# Patient Record
Sex: MALE | Race: OTHER | HISPANIC OR LATINO | Employment: FULL TIME | URBAN - METROPOLITAN AREA
[De-identification: names, ages, dates, MRNs, and addresses within clinical notes are randomized per-mention and may not be internally consistent; named-entity substitution may affect disease eponyms.]

---

## 2017-01-16 ENCOUNTER — PATIENT OUTREACH (OUTPATIENT)
Dept: OTHER | Facility: OTHER | Age: 57
End: 2017-01-16
Payer: COMMERCIAL

## 2017-01-16 NOTE — PROGRESS NOTES
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 Inquired about how his low sodium diet and exercise is going. Overall, his readings are looking pretty good and he continues to take readings weekly. Advised pt to call or message back if he has any questions or concerns.

## 2017-01-23 DIAGNOSIS — R97.20 ELEVATED PSA: ICD-10-CM

## 2017-01-23 DIAGNOSIS — F43.0 SITUATIONAL, DISTURBANCE, ACUTE: ICD-10-CM

## 2017-01-23 RX ORDER — FINASTERIDE 5 MG/1
5 TABLET, FILM COATED ORAL DAILY
Qty: 90 TABLET | Refills: 3 | Status: SHIPPED | OUTPATIENT
Start: 2017-01-23 | End: 2017-09-13

## 2017-01-23 RX ORDER — CITALOPRAM 10 MG/1
10 TABLET ORAL DAILY
Qty: 90 TABLET | Refills: 3 | Status: SHIPPED | OUTPATIENT
Start: 2017-01-23 | End: 2017-03-07 | Stop reason: SDUPTHER

## 2017-02-24 ENCOUNTER — PATIENT OUTREACH (OUTPATIENT)
Dept: OTHER | Facility: OTHER | Age: 57
End: 2017-02-24
Payer: COMMERCIAL

## 2017-02-24 NOTE — PROGRESS NOTES
Last 5 Patient Entered Readings                                                               Current 30 Day Average: 128/82     Recent Readings 2/15/2017 2/7/2017 2/7/2017 2/1/2017 2/1/2017    Systolic BP (mmHg) 127 134 146 127 128    Diastolic BP (mmHg) 80 79 85 90 92    Pulse 75 66 66 79 83        LVM to follow up with Dr. Parth Juarez. Inquired about how his low sodium diet and exercise is going. Overall, his readings are looking good, just reminded him to get one in today or over the weekend. Advised pt to call or message back if he has any questions or concerns.      2/28 - Pt LVM letting me that he will take some readings and he will let me know if he has any issues.

## 2017-03-06 ENCOUNTER — DOCUMENTATION ONLY (OUTPATIENT)
Dept: FAMILY MEDICINE | Facility: CLINIC | Age: 57
End: 2017-03-06

## 2017-03-06 ENCOUNTER — PATIENT OUTREACH (OUTPATIENT)
Dept: OTHER | Facility: OTHER | Age: 57
End: 2017-03-06
Payer: COMMERCIAL

## 2017-03-06 ENCOUNTER — TELEPHONE (OUTPATIENT)
Dept: FAMILY MEDICINE | Facility: CLINIC | Age: 57
End: 2017-03-06

## 2017-03-06 ENCOUNTER — LAB VISIT (OUTPATIENT)
Dept: LAB | Facility: HOSPITAL | Age: 57
End: 2017-03-06
Attending: FAMILY MEDICINE
Payer: COMMERCIAL

## 2017-03-06 DIAGNOSIS — Z23 NEED FOR SHINGLES VACCINE: Primary | ICD-10-CM

## 2017-03-06 DIAGNOSIS — Z12.5 PROSTATE CANCER SCREENING: ICD-10-CM

## 2017-03-06 DIAGNOSIS — E78.5 HYPERLIPIDEMIA, UNSPECIFIED HYPERLIPIDEMIA TYPE: Primary | ICD-10-CM

## 2017-03-06 DIAGNOSIS — R73.9 HYPERGLYCEMIA: ICD-10-CM

## 2017-03-06 DIAGNOSIS — E78.5 HYPERLIPIDEMIA, UNSPECIFIED HYPERLIPIDEMIA TYPE: ICD-10-CM

## 2017-03-06 LAB
ALBUMIN SERPL BCP-MCNC: 4.1 G/DL
ALP SERPL-CCNC: 69 U/L
ALT SERPL W/O P-5'-P-CCNC: 35 U/L
ANION GAP SERPL CALC-SCNC: 7 MMOL/L
AST SERPL-CCNC: 27 U/L
BASOPHILS # BLD AUTO: 0.01 K/UL
BASOPHILS NFR BLD: 0.2 %
BILIRUB SERPL-MCNC: 0.6 MG/DL
BUN SERPL-MCNC: 17 MG/DL
CALCIUM SERPL-MCNC: 9.8 MG/DL
CHLORIDE SERPL-SCNC: 104 MMOL/L
CHOLEST/HDLC SERPL: 3 {RATIO}
CO2 SERPL-SCNC: 29 MMOL/L
COMPLEXED PSA SERPL-MCNC: 3.4 NG/ML
CREAT SERPL-MCNC: 1.1 MG/DL
DIFFERENTIAL METHOD: ABNORMAL
EOSINOPHIL # BLD AUTO: 0.1 K/UL
EOSINOPHIL NFR BLD: 2.4 %
ERYTHROCYTE [DISTWIDTH] IN BLOOD BY AUTOMATED COUNT: 13.3 %
EST. GFR  (AFRICAN AMERICAN): >60 ML/MIN/1.73 M^2
EST. GFR  (NON AFRICAN AMERICAN): >60 ML/MIN/1.73 M^2
GLUCOSE SERPL-MCNC: 84 MG/DL
HCT VFR BLD AUTO: 41.5 %
HDL/CHOLESTEROL RATIO: 33.3 %
HDLC SERPL-MCNC: 171 MG/DL
HDLC SERPL-MCNC: 57 MG/DL
HGB BLD-MCNC: 14 G/DL
LDLC SERPL CALC-MCNC: 95.2 MG/DL
LYMPHOCYTES # BLD AUTO: 1.9 K/UL
LYMPHOCYTES NFR BLD: 35.8 %
MCH RBC QN AUTO: 30.5 PG
MCHC RBC AUTO-ENTMCNC: 33.7 %
MCV RBC AUTO: 90 FL
MONOCYTES # BLD AUTO: 0.3 K/UL
MONOCYTES NFR BLD: 6.2 %
NEUTROPHILS # BLD AUTO: 3 K/UL
NEUTROPHILS NFR BLD: 55.4 %
NONHDLC SERPL-MCNC: 114 MG/DL
PLATELET # BLD AUTO: 201 K/UL
PMV BLD AUTO: 10.5 FL
POTASSIUM SERPL-SCNC: 4.7 MMOL/L
PROT SERPL-MCNC: 7.5 G/DL
RBC # BLD AUTO: 4.59 M/UL
SODIUM SERPL-SCNC: 140 MMOL/L
TRIGL SERPL-MCNC: 94 MG/DL
WBC # BLD AUTO: 5.36 K/UL

## 2017-03-06 PROCEDURE — 80061 LIPID PANEL: CPT

## 2017-03-06 PROCEDURE — 84153 ASSAY OF PSA TOTAL: CPT

## 2017-03-06 PROCEDURE — 36415 COLL VENOUS BLD VENIPUNCTURE: CPT | Mod: PO

## 2017-03-06 PROCEDURE — 80053 COMPREHEN METABOLIC PANEL: CPT

## 2017-03-06 PROCEDURE — 83036 HEMOGLOBIN GLYCOSYLATED A1C: CPT

## 2017-03-06 PROCEDURE — 85025 COMPLETE CBC W/AUTO DIFF WBC: CPT

## 2017-03-06 NOTE — PROGRESS NOTES
Pre-Visit Chart Review  For Appointment Scheduled on 3-7-17    Health Maintenance Due   Topic Date Due    Lipid Panel  01/18/2017

## 2017-03-06 NOTE — PROGRESS NOTES
Last 5 Patient Entered Readings                                                               Current 30 Day Average: 134/84     Recent Readings 3/6/2017 3/6/2017 3/6/2017 3/6/2017 3/6/2017    Systolic BP (mmHg) 143 149 153 166 156    Diastolic BP (mmHg) 87 99 102 138 98    Pulse 65 71 70 75 72        Hypertension Medications             losartan-hydrochlorothiazide 100-25 mg (HYZAAR) 100-25 mg per tablet Take 1 tablet by mouth every morning.        Plan:   Called patient to follow up. Per current 30 day average, BP is well controlled. Readings taken this am were elevated. LVM.  Will continue to monitor. WCB in 3 months, sooner if BP begins to trend up or down.

## 2017-03-07 ENCOUNTER — OFFICE VISIT (OUTPATIENT)
Dept: FAMILY MEDICINE | Facility: CLINIC | Age: 57
End: 2017-03-07
Payer: COMMERCIAL

## 2017-03-07 VITALS
HEART RATE: 59 BPM | TEMPERATURE: 98 F | BODY MASS INDEX: 28.07 KG/M2 | DIASTOLIC BLOOD PRESSURE: 85 MMHG | HEIGHT: 68 IN | SYSTOLIC BLOOD PRESSURE: 134 MMHG | WEIGHT: 185.19 LBS

## 2017-03-07 DIAGNOSIS — R73.9 HYPERGLYCEMIA: ICD-10-CM

## 2017-03-07 DIAGNOSIS — J45.990 EXERCISE-INDUCED ASTHMA: ICD-10-CM

## 2017-03-07 DIAGNOSIS — I10 GOOD HYPERTENSION CONTROL: ICD-10-CM

## 2017-03-07 DIAGNOSIS — E78.5 HYPERLIPIDEMIA, UNSPECIFIED HYPERLIPIDEMIA TYPE: ICD-10-CM

## 2017-03-07 DIAGNOSIS — Z01.00 EYE EXAM, ROUTINE: ICD-10-CM

## 2017-03-07 DIAGNOSIS — R80.9 POSITIVE FOR MICROALBUMINURIA: ICD-10-CM

## 2017-03-07 DIAGNOSIS — R97.20 ELEVATED PSA: ICD-10-CM

## 2017-03-07 DIAGNOSIS — Z12.5 SCREENING PSA (PROSTATE SPECIFIC ANTIGEN): ICD-10-CM

## 2017-03-07 DIAGNOSIS — F41.9 CHRONIC ANXIETY: ICD-10-CM

## 2017-03-07 LAB
ESTIMATED AVG GLUCOSE: 114 MG/DL
HBA1C MFR BLD HPLC: 5.6 %

## 2017-03-07 PROCEDURE — 99999 PR PBB SHADOW E&M-EST. PATIENT-LVL III: CPT | Mod: PBBFAC,,, | Performed by: FAMILY MEDICINE

## 2017-03-07 PROCEDURE — 99214 OFFICE O/P EST MOD 30 MIN: CPT | Mod: S$GLB,,, | Performed by: FAMILY MEDICINE

## 2017-03-07 RX ORDER — ATORVASTATIN CALCIUM 40 MG/1
40 TABLET, FILM COATED ORAL DAILY
Qty: 90 TABLET | Refills: 1 | Status: SHIPPED | OUTPATIENT
Start: 2017-03-07 | End: 2017-09-13 | Stop reason: SDUPTHER

## 2017-03-07 RX ORDER — CITALOPRAM 20 MG/1
20 TABLET, FILM COATED ORAL DAILY
Qty: 90 TABLET | Refills: 3 | Status: SHIPPED | OUTPATIENT
Start: 2017-03-07 | End: 2017-04-28 | Stop reason: SDUPTHER

## 2017-03-07 NOTE — MR AVS SNAPSHOT
[facility]  [address]  [place] [address]  Phone: [phone]  Fax: [phone]                  Parth Juarez   3/7/2017 2:00 PM   Office Visit    Description:  Male : 1960   Provider:  Betty Styles MD   Department:  [facility] Medicine           Reason for Visit     Annual Exam     Establish Care           Diagnoses this Visit        Comments    Eye exam, routine    -  Primary     Situational, disturbance, acute                To Do List           Future Appointments        Provider Department Dept Phone    3/14/2017 2:30 PM Agusto Dawn, [facility] [phone]    2017 2:40 PM Betty Styles MD [facility] [phone]      Goals (5 Years of Data)              Today    Today    Today    Blood Pressure <= 140/90   134/85  134/85  134/85    Notes - Note created  2016  2:46 PM by Anay Arnold, Leta    It is important to consistently maintain a controlled blood pressure.      Exercise at least 150 minutes per week.           Notes - Note created  2016  2:46 PM by Anay Arnold, PharmD    Continue walking every morning for 30 minutes!      Take at least one BP reading per week at various times of the day             Follow-Up and Disposition     Return in about 6 months (around 2017).       These Medications        Disp Refills Start End    atorvastatin (LIPITOR) 40 MG tablet 90 tablet 1 3/7/2017     Take 1 tablet (40 mg total) by mouth once daily. - Oral    [facility] [address] [place] [id number] [address] [employer] Ph #: [phone]       citalopram (CELEXA) 20 MG tablet 90 tablet 3 3/7/2017 3/7/2018    Take 1 tablet (20 mg total) by mouth once daily. - Oral    Pharmacy: [facility] [address] [place] [id number] Ph #: [phone]         [facility]     [facility] Nurse Care [facility] -  Assistance  Registered nurses in the [facility] Call Center provide clinical  advisement, health education, appointment booking, and other advisory services.  Call for this free service at [phone].             Medications           Message regarding Medications     Verify the changes and/or additions to your medication regime listed below are the same as discussed with your clinician today.  If any of these changes or additions are incorrect, please notify your healthcare provider.        CHANGE how you are taking these medications     Start Taking Instead of    atorvastatin (LIPITOR) 40 MG tablet atorvastatin (LIPITOR) 40 MG tablet    Dosage:  Take 1 tablet (40 mg total) by mouth once daily. Dosage:  Take 40 mg by mouth once daily.    Reason for Change:  Reorder     citalopram (CELEXA) 20 MG tablet citalopram (CELEXA) 10 MG tablet    Dosage:  Take 1 tablet (20 mg total) by mouth once daily. Dosage:  Take 1 tablet (10 mg total) by mouth once daily.    Reason for Change:  Reorder            Verify that the below list of medications is an accurate representation of the medications you are currently taking.  If none reported, the list may be blank. If incorrect, please contact your healthcare provider. Carry this list with you in case of emergency.           Current Medications     albuterol 90 mcg/actuation inhaler Inhale 2 puffs into the lungs every 6 (six) hours as needed for Wheezing.    ascorbic acid, vitamin C, (VITAMIN C) 500 MG tablet Take 500 mg by mouth once daily.    atorvastatin (LIPITOR) 40 MG tablet Take 1 tablet (40 mg total) by mouth once daily.    citalopram (CELEXA) 20 MG tablet Take 1 tablet (20 mg total) by mouth once daily.    fluticasone (FLONASE) 50 mcg/actuation nasal spray 1 spray by Each Nare route daily as needed for Rhinitis.    FOLIC ACID/MULTIVIT-MIN/LUTEIN (CENTRUM SILVER ORAL) Take 1 tablet by mouth once daily.    inhalation device (AEROCHAMBER PLUS FLOW-VU) Use as directed for inhalation.    losartan-hydrochlorothiazide 100-25 mg (HYZAAR) 100-25 mg per  "tablet Take 1 tablet by mouth every morning.    OMEGA-3S/DHA/EPA/FISH OIL (OMEGA 3 ORAL) Take 4 capsules by mouth once daily.     psyllium (METAMUCIL) 0.52 gram capsule Take 4 capsules by mouth once daily.     finasteride (PROSCAR) 5 mg tablet Take 1 tablet (5 mg total) by mouth once daily.           Clinical Reference Information           Your Vitals Were     BP Pulse Temp Height Weight BMI    134/85 (BP Location: Right arm, Patient Position: Sitting, BP Method: Automatic) 59 97.8 °F (36.6 °C) (Oral) 5' 8" (1.727 m) 84 kg (185 lb 3 oz) 28.16 kg/m2      Blood Pressure          Most Recent Value    BP  134/85      Allergies as of 3/7/2017     No Known Drug Allergies      Immunizations Administered on Date of Encounter - 3/7/2017     None      Orders Placed During Today's Visit      Normal Orders This Visit    Ambulatory Referral to Optometry       Language Assistance Services     ATTENTION: Language assistance services are available, free of charge. Please call [phone].      ATENCIÓN: Si marshall bianca, tiene a de jesus disposición servicios gratuitos de asistencia lingüística. Llame al [phone].     [facility] Ý: N?u b?n nói Ti?ng Vi?t, có các d?ch v? h? tr? ngôn ng? mi?n phí dành cho b?n. G?i s? [phone].         [facility] complies with applicable Federal civil rights laws and does not discriminate on the basis of race, color, national origin, age, disability, or sex.        "

## 2017-03-07 NOTE — PROGRESS NOTES
Subjective:       Patient ID: Parth Juarez is a 56 y.o. male.    Chief Complaint: Annual Exam and Establish Care    Patient Active Problem List   Diagnosis    Hyperlipidemia    Good hypertension control    Exercise-induced asthma    Positive for microalbuminuria    Chronic anxiety    Hyperglycemia   Patient is here to establish care and get routine check up    HTN- participating in the digital HTN program.  Most numbers are at goal < 140/90 consistently.  He has gained 7 lbs recently due to a vacation to [place].  He tries to exercise and walk regularly.  Non smoker.      Started celexa 2013 during a period of increased anxiety and sad mood.  Finds it is helping but anxiety is not as well controlled on 10 as opposed to 20mg.  20mg makes him feel less creative. But he finds his performance is better on 20mg     Has h/o borderline elevated non-fasting BS.  Reviewed recent labs  Lab Visit on 03/06/2017   Component Date Value Ref Range Status    Microalbum.,U,Random 03/06/2017 117.0  ug/mL Final    Creatinine, Random Ur 03/06/2017 205.0  23.0 - 375.0 mg/dL Final    Microalb Creat Ratio 03/06/2017 57.1* 0.0 - 30.0 ug/mg Final   Lab Visit on 03/06/2017   Component Date Value Ref Range Status    WBC 03/06/2017 5.36  3.90 - 12.70 K/uL Final    RBC 03/06/2017 4.59* 4.60 - 6.20 M/uL Final    Hemoglobin 03/06/2017 14.0  14.0 - 18.0 g/dL Final    Hematocrit 03/06/2017 41.5  40.0 - 54.0 % Final    MCV 03/06/2017 90  82 - 98 fL Final    MCH 03/06/2017 30.5  27.0 - 31.0 pg Final    MCHC 03/06/2017 33.7  32.0 - 36.0 % Final    RDW 03/06/2017 13.3  11.5 - 14.5 % Final    Platelets 03/06/2017 201  150 - 350 K/uL Final    MPV 03/06/2017 10.5  9.2 - 12.9 fL Final    Gran # 03/06/2017 3.0  1.8 - 7.7 K/uL Final    Lymph # 03/06/2017 1.9  1.0 - 4.8 K/uL Final    Mono # 03/06/2017 0.3  0.3 - 1.0 K/uL Final    Eos # 03/06/2017 0.1  0.0 - 0.5 K/uL Final    Baso # 03/06/2017 0.01  0.00 - 0.20 K/uL Final    Gran%  03/06/2017 55.4  38.0 - 73.0 % Final    Lymph% 03/06/2017 35.8  18.0 - 48.0 % Final    Mono% 03/06/2017 6.2  4.0 - 15.0 % Final    Eosinophil% 03/06/2017 2.4  0.0 - 8.0 % Final    Basophil% 03/06/2017 0.2  0.0 - 1.9 % Final    Differential Method 03/06/2017 Automated   Final    Sodium 03/06/2017 140  136 - 145 mmol/L Final    Potassium 03/06/2017 4.7  3.5 - 5.1 mmol/L Final    Chloride 03/06/2017 104  95 - 110 mmol/L Final    CO2 03/06/2017 29  23 - 29 mmol/L Final    Glucose 03/06/2017 84  70 - 110 mg/dL Final    BUN, Bld 03/06/2017 17  6 - 20 mg/dL Final    Creatinine 03/06/2017 1.1  0.5 - 1.4 mg/dL Final    Calcium 03/06/2017 9.8  8.7 - 10.5 mg/dL Final    Total Protein 03/06/2017 7.5  6.0 - 8.4 g/dL Final    Albumin 03/06/2017 4.1  3.5 - 5.2 g/dL Final    Total Bilirubin 03/06/2017 0.6  0.1 - 1.0 mg/dL Final    Alkaline Phosphatase 03/06/2017 69  55 - 135 U/L Final    AST 03/06/2017 27  10 - 40 U/L Final    ALT 03/06/2017 35  10 - 44 U/L Final    Anion Gap 03/06/2017 7* 8 - 16 mmol/L Final    eGFR if African American 03/06/2017 >60.0  >60 mL/min/1.73 m^2 Final    eGFR if non African American 03/06/2017 >60.0  >60 mL/min/1.73 m^2 Final    Hemoglobin A1C 03/06/2017 5.6  4.5 - 6.2 % Final    Estimated Avg Glucose 03/06/2017 114  68 - 131 mg/dL Final    Cholesterol 03/06/2017 171  120 - 199 mg/dL Final    Triglycerides 03/06/2017 94  30 - 150 mg/dL Final    HDL 03/06/2017 57  40 - 75 mg/dL Final    LDL Cholesterol 03/06/2017 95.2  63.0 - 159.0 mg/dL Final    HDL/Chol Ratio 03/06/2017 33.3  20.0 - 50.0 % Final    Total Cholesterol/HDL Ratio 03/06/2017 3.0  2.0 - 5.0 Final    Non-HDL Cholesterol 03/06/2017 114  mg/dL Final    PSA, SCREEN 03/06/2017 3.4  0.00 - 4.00 ng/mL Final             HPI  Review of Systems   Constitutional: Positive for unexpected weight change.   Respiratory: Negative for shortness of breath.    Cardiovascular: Negative for chest pain, palpitations and leg  swelling.   Genitourinary: Negative for difficulty urinating, frequency and hematuria.   Musculoskeletal: Negative for neck pain.   Neurological: Negative for headaches.       Objective:      Physical Exam   Constitutional: He is oriented to person, place, and time. He appears well-developed and well-nourished.   HENT:   Right Ear: Tympanic membrane and ear canal normal.   Left Ear: Tympanic membrane and ear canal normal.   Nose: Nose normal.   Mouth/Throat: Oropharynx is clear and moist.   Neck: No thyromegaly present.   Cardiovascular: Normal rate, regular rhythm and normal heart sounds.    Pulmonary/Chest: Effort normal and breath sounds normal.   Abdominal: Soft. Bowel sounds are normal. He exhibits no distension. There is no tenderness. There is no rebound and no guarding.   Musculoskeletal: He exhibits no edema.   Lymphadenopathy:     He has no cervical adenopathy.   Neurological: He is alert and oriented to person, place, and time.   Skin: Skin is warm and dry.   Psychiatric: He has a normal mood and affect.   Nursing note and vitals reviewed.      Assessment:       1. Good hypertension control    2. Chronic anxiety    3. Eye exam, routine    4. Hyperlipidemia, unspecified hyperlipidemia type    5. Hyperglycemia    6. Screening PSA (prostate specific antigen)    7. Positive for microalbuminuria    8. Elevated PSA    9. Exercise-induced asthma        Plan:       1. Good hypertension control  Controlled on current medications.  Continue current medications.      2. Chronic anxiety  Uncontrolled.  Increase to:  - citalopram (CELEXA) 20 MG tablet; Take 1 tablet (20 mg total) by mouth once daily.  Dispense: 90 tablet; Refill: 3  I discussed with the patient the risks, side effects and the benefits of the medication including the black box warning regarding suicidal ideation/risk if applicable.  I counseled the patient on medication titration, length of time before maximum benefits are reached, and duration of  treatment expected.  I advised the patient to return to clinic or go to the emergency department if suicidal thoughts occur, thought of hurting others, hallucinations, or other serious symptoms.  Patient voiced no intention of self-harm.  The patient expressed verbal understanding and elected to proceed with treatment.  All questions were answered.      3. Eye exam, routine  Refer for eval and treatment  - Ambulatory Referral to Optometry    4. Hyperlipidemia, unspecified hyperlipidemia type  Controlled on current meds  - Comprehensive metabolic panel; Future  - Lipid panel; Future  - CBC auto differential; Future    5. Hyperglycemia  Controlled on diet  - Hemoglobin A1c; Future  - Microalbumin/creatinine urine ratio; Future    6. Screening PSA (prostate specific antigen)  Screen and treat as indicated:    - PSA, Screening; Future    7. Positive for microalbuminuria  ON ARB.  Control HTN and monitor    8. Elevated PSA  Decreased since last exam.  Will defer to urology for consult    [facility] goal documentation:  Patient readiness: acceptance and barriers:readiness  During the course of the visit the patient was educated and counseled about the following: Hypertension:   Dietary sodium restriction.  Regular aerobic exercise.  Goals: Hypertension: Reduce Blood Pressure  Goal/Outcomes met:Hypertension  The following self management tools provided:none  Patient Instructions (the written plan) was given to the patient/family: Yes  Time spent with patient: 40 minutes    Patient with be reevaluated in 6 months or sooner prn    Greater than 50% of this visit was spent counseling as described in above documentation:Yes        9. Exercise-induced asthma  Rare use of albuterol

## 2017-03-07 NOTE — Clinical Note
Herberth Gibbs.  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  Though this is much improved since his last PSA he is taking finasteride which can falsely lower it.  Is there any reason to consider further evaluation or could I just add a free PSA in 6 months?    Thanks, Dr. Styles

## 2017-03-07 NOTE — PROGRESS NOTES
Answers for HPI/ROS submitted by the patient on 3/4/2017   Hypertension  Chronicity: recurrent  Onset: more than 1 year ago  Progression since onset: rapidly improving  Condition status: controlled  anxiety: Yes  blurred vision: No  chest pain: No  headaches: No  malaise/fatigue: No  neck pain: No  orthopnea: No  palpitations: No  peripheral edema: No  PND: No  shortness of breath: No  sweats: No  Agents associated with hypertension: no associated agents  CAD risks: dyslipidemia, obesity, sedentary lifestyle, stress  Compliance problems: diet, exercise  Past treatments: angiotensin blockers, diuretics, lifestyle changes  Improvement on treatment: significant

## 2017-03-14 ENCOUNTER — OFFICE VISIT (OUTPATIENT)
Dept: OPTOMETRY | Facility: CLINIC | Age: 57
End: 2017-03-14
Payer: COMMERCIAL

## 2017-03-14 DIAGNOSIS — H25.13 NUCLEAR SCLEROSIS, BILATERAL: Primary | ICD-10-CM

## 2017-03-14 DIAGNOSIS — H04.123 DRY EYE SYNDROME, BILATERAL: ICD-10-CM

## 2017-03-14 DIAGNOSIS — H11.001 PTERYGIUM, RIGHT: ICD-10-CM

## 2017-03-14 DIAGNOSIS — H26.9 CORTICAL CATARACT: ICD-10-CM

## 2017-03-14 DIAGNOSIS — H52.7 REFRACTIVE ERROR: ICD-10-CM

## 2017-03-14 PROCEDURE — 92014 COMPRE OPH EXAM EST PT 1/>: CPT | Mod: S$GLB,,, | Performed by: OPTOMETRIST

## 2017-03-14 PROCEDURE — 99999 PR PBB SHADOW E&M-EST. PATIENT-LVL II: CPT | Mod: PBBFAC,,, | Performed by: OPTOMETRIST

## 2017-03-14 PROCEDURE — 92015 DETERMINE REFRACTIVE STATE: CPT | Mod: S$GLB,,, | Performed by: OPTOMETRIST

## 2017-03-14 NOTE — PROGRESS NOTES
HPI     CC: Pt here today for yearly eye exam. Pt states vision has been stable   with current glasses. Reading has been stable with current glasses.     (-) pain / (-) discomfort  (-) headaches  (-) diplopia   (-) flashes / (-) floaters         Last edited by Agusto Dawn, OD on 3/14/2017  3:00 PM.     ROS     Positive for: Cardiovascular (HTN), Eyes    Negative for: Constitutional, Gastrointestinal, Neurological, Skin,   Genitourinary, Musculoskeletal, HENT, Endocrine, Respiratory, Psychiatric,   Allergic/Imm, Heme/Lymph    Last edited by Agusto Dawn, OD on 3/14/2017  3:01 PM. (History)        Assessment /Plan     For exam results, see Encounter Report.    Nuclear sclerosis, bilateral    Cortical cataract    Pterygium, right    Dry eye syndrome, bilateral    Refractive error      Mild cataracts ou. Discussed possible ocular affects of cataracts. Acceptable BCVA OU. Discussed treatment options. Surgery not recommended at this time. Monitor yearly.     Pterygium, mild, inferior nasal, flat. Non-visually significant at this time. Recommend otc Systane drops twice daily ou. Return if symptomatic.     Dispensed updated spectacle Rx. Discussed various spectacle lens options. OD stable, moderate change OS cyl. Discussed findings and adaptation period. Return if any problems.       RTC in 1 year for comprehensive eye exam, or sooner prn.

## 2017-03-14 NOTE — LETTER
March 14, 2017      Betty Styles MD  [address]  [place] [address]           [place] [facility]  [address] [place] [address] [address]  [place] [address]  Phone: [phone]          Patient: Parth Juarez   MR Number: [id number]   YOB: 1960   Date of Visit: 3/14/2017       Dear Dr. Betty Styles:    Thank you for referring Parth Juarez to me for evaluation. Attached you will find relevant portions of my assessment and plan of care.    If you have questions, please do not hesitate to call me. I look forward to following Parth Juarez along with you.    Sincerely,    Agusto Dawn, OD    Enclosure  CC:  No Recipients    If you would like to receive this communication electronically, please contact [email or web address] or [phone] to request more information on [employer] Link access.    For providers and/or their staff who would like to refer a patient to [facility], please contact us through our one-stop-shop provider referral line, [facility], at [phone].    If you feel you have received this communication in error or would no longer like to receive these types of communications, please e-mail [email or web address]

## 2017-03-24 ENCOUNTER — PATIENT OUTREACH (OUTPATIENT)
Dept: OTHER | Facility: OTHER | Age: 57
End: 2017-03-24
Payer: COMMERCIAL

## 2017-03-24 NOTE — PROGRESS NOTES
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 Patient is maintaining a low sodium diet and continuing his exercise regime. He reports that he is doing well and feeling good. Patient did not have any further questions or concerns. I will follow up in a few weeks to see how he is doing and progressing.

## 2017-04-28 ENCOUNTER — OFFICE VISIT (OUTPATIENT)
Dept: PSYCHIATRY | Facility: CLINIC | Age: 57
End: 2017-04-28
Payer: COMMERCIAL

## 2017-04-28 VITALS
TEMPERATURE: 98 F | HEIGHT: 68 IN | SYSTOLIC BLOOD PRESSURE: 131 MMHG | HEART RATE: 64 BPM | DIASTOLIC BLOOD PRESSURE: 84 MMHG

## 2017-04-28 DIAGNOSIS — Z79.899 HIGH RISK MEDICATION USE: ICD-10-CM

## 2017-04-28 DIAGNOSIS — F41.9 ANXIETY: ICD-10-CM

## 2017-04-28 DIAGNOSIS — F90.0 ATTENTION DEFICIT HYPERACTIVITY DISORDER (ADHD), PREDOMINANTLY INATTENTIVE TYPE: ICD-10-CM

## 2017-04-28 PROCEDURE — 90792 PSYCH DIAG EVAL W/MED SRVCS: CPT | Mod: S$GLB,,, | Performed by: PSYCHIATRY & NEUROLOGY

## 2017-04-28 PROCEDURE — 99999 PR PBB SHADOW E&M-EST. PATIENT-LVL II: CPT | Mod: PBBFAC,,, | Performed by: PSYCHIATRY & NEUROLOGY

## 2017-04-28 RX ORDER — CITALOPRAM 20 MG/1
TABLET, FILM COATED ORAL
Qty: 135 TABLET | Refills: 0 | Status: SHIPPED | OUTPATIENT
Start: 2017-04-28 | End: 2017-07-14 | Stop reason: DRUGHIGH

## 2017-04-28 RX ORDER — LISDEXAMFETAMINE DIMESYLATE CAPSULES 20 MG/1
20 CAPSULE ORAL EVERY MORNING
Qty: 30 CAPSULE | Refills: 0 | Status: SHIPPED | OUTPATIENT
Start: 2017-04-28 | End: 2017-05-09 | Stop reason: SINTOL

## 2017-04-28 RX ORDER — CITALOPRAM 20 MG/1
TABLET, FILM COATED ORAL
Qty: 45 TABLET | Refills: 1 | Status: SHIPPED | OUTPATIENT
Start: 2017-04-28 | End: 2017-04-28 | Stop reason: SDUPTHER
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
## 2017-04-28 NOTE — MR AVS SNAPSHOT
[facility]  [address]  [place] [address]  Phone: [phone]                  Parth Juarez   2017 11:00 AM   Office Visit    Description:  Male : 1960   Provider:  Casie Garza MD   Department:  [facility]           Diagnoses this Visit        Comments    Anxiety         Attention deficit hyperactivity disorder (ADHD), predominantly inattentive type         High risk medication use                To Do List           Future Appointments        Provider Department Dept Phone    2017 2:40 PM Betty Styles MD [facility] [phone]      Goals (5 Years of Data)              Today    17    Blood Pressure <= 140/90   131/84  131/84  131/84    Notes - Note created  2016  2:46 PM by Anay Arnold, PharmD    It is important to consistently maintain a controlled blood pressure.      Exercise at least 150 minutes per week.           Notes - Note created  2016  2:46 PM by Anay Arnold, PharmD    Continue walking every morning for 30 minutes!      Take at least one BP reading per week at various times of the day             Follow-Up and Disposition     Return in about 4 weeks (around 2017).       These Medications        Disp Refills Start End    lisdexamfetamine (VYVANSE) 20 MG capsule 30 capsule 0 2017     Take 1 capsule (20 mg total) by mouth every morning. - Oral    Pharmacy: [facility] [address] [place] [id number] [employer] Ph #: [phone]       citalopram (CELEXA) 20 MG tablet 135 tablet 0 2017     Take one and one-half tablets PO daily    Pharmacy: [facility] [address] [place] [id number] [employer] Ph #: [phone]         [facility]     [facility] Nurse Care Line -  Assistance  Unless otherwise directed by your provider, please contact [facility], our nurse care line that is available for 24/ assistance.     Registered nurses in the [facility] Center provide: appointment scheduling, clinical advisement, health education, and other advisory services.  Call: [phone] (toll free)               Medications           Message regarding Medications     Verify the changes and/or additions to your medication regime listed below are the same as discussed with your clinician today.  If any of these changes or additions are incorrect, please notify your healthcare provider.        START taking these NEW medications        Refills    lisdexamfetamine (VYVANSE) 20 MG capsule 0    Sig: Take 1 capsule (20 mg total) by mouth every morning.    Class: Normal    Route: Oral      CHANGE how you are taking these medications     Start Taking Instead of    citalopram (CELEXA) 20 MG tablet citalopram (CELEXA) 20 MG tablet    Dosage:  Take one and one-half tablets PO daily Dosage:  Take 1 tablet (20 mg total) by mouth once daily.    Reason for Change:  Reorder            Verify that the below list of medications is an accurate representation of the medications you are currently taking.  If none reported, the list may be blank. If incorrect, please contact your healthcare provider. Carry this list with you in case of emergency.           Current Medications     albuterol 90 mcg/actuation inhaler Inhale 2 puffs into the lungs every 6 (six) hours as needed for Wheezing.    ascorbic acid, vitamin C, (VITAMIN C) 500 MG tablet Take 500 mg by mouth once daily.    atorvastatin (LIPITOR) 40 MG tablet Take 1 tablet (40 mg total) by mouth once daily.    citalopram (CELEXA) 20 MG tablet Take one and one-half tablets PO daily    finasteride (PROSCAR) 5 mg tablet Take 1 tablet (5 mg total) by mouth once daily.    fluticasone (FLONASE) 50 mcg/actuation nasal spray 1 spray by Each Nare route daily as needed for Rhinitis.    FOLIC ACID/MULTIVIT-MIN/LUTEIN (CENTRUM SILVER ORAL) Take 1 tablet by mouth once daily.    inhalation device (AEROCHAMBER PLUS FLOW-VU) Use as directed for  "inhalation.    losartan-hydrochlorothiazide 100-25 mg (HYZAAR) 100-25 mg per tablet Take 1 tablet by mouth every morning.    OMEGA-3S/DHA/EPA/FISH OIL (OMEGA 3 ORAL) Take 4 capsules by mouth once daily.     psyllium (METAMUCIL) 0.52 gram capsule Take 4 capsules by mouth once daily.     lisdexamfetamine (VYVANSE) 20 MG capsule Take 1 capsule (20 mg total) by mouth every morning.           Clinical Reference Information           Your Vitals Were     BP Pulse Temp Height          131/84 64 97.6 °F (36.4 °C) 5' 8" (1.727 m)        Blood Pressure          Most Recent Value    BP  131/84      Allergies as of 4/28/2017     No Known Drug Allergies      Immunizations Administered on Date of Encounter - 4/28/2017     None      Orders Placed During Today's Visit      Normal Orders This Visit    Toxicology screen, urine     Future Labs/Procedures Expected by Expires    TSH  4/28/2017 6/27/2018    SCHEDULED EKG 12-LEAD (to Muse)  As directed 4/28/2018      Language Assistance Services     ATTENTION: Language assistance services are available, free of charge. Please call [phone].      ATENCIÓN: Si habla bianca, tiene a de jesus disposición servicios gratuitos de asistencia lingüística. Llame al [phone].     SARAH Ý: N?u b?n nói Ti?ng Vi?t, có các d?ch v? h? tr? ngôn ng? mi?n phí dành cho b?n. G?i s? [phone].         [facility] complies with applicable Federal civil rights laws and does not discriminate on the basis of race, color, national origin, age, disability, or sex.        "

## 2017-05-09 ENCOUNTER — PATIENT MESSAGE (OUTPATIENT)
Dept: PSYCHIATRY | Facility: CLINIC | Age: 57
End: 2017-05-09

## 2017-05-23 ENCOUNTER — PATIENT MESSAGE (OUTPATIENT)
Dept: ADMINISTRATIVE | Facility: OTHER | Age: 57
End: 2017-05-23

## 2017-05-25 ENCOUNTER — PATIENT OUTREACH (OUTPATIENT)
Dept: OTHER | Facility: OTHER | Age: 57
End: 2017-05-25
Payer: COMMERCIAL

## 2017-05-25 ENCOUNTER — PATIENT MESSAGE (OUTPATIENT)
Dept: OTHER | Facility: OTHER | Age: 57
End: 2017-05-25

## 2017-05-25 NOTE — PROGRESS NOTES
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 Patient is maintaining a low sodium diet and continuing his exercise regime. He was busy at work but informed me that he is doing well and feeling good. He ate some cheese last night and believes that is why his BP is elevated today but he denies symptoms. Patient did not have any further questions or concerns. I will follow up in a few weeks to see how he is doing and progressing.

## 2017-06-06 ENCOUNTER — PATIENT OUTREACH (OUTPATIENT)
Dept: OTHER | Facility: OTHER | Age: 57
End: 2017-06-06
Payer: COMMERCIAL

## 2017-06-06 NOTE — PROGRESS NOTES
Last 5 Patient Entered Readings                                                               Current 30 Day Average: 134/87     Recent Readings 6/5/2017 6/5/2017 6/5/2017 6/5/2017 6/2/2017    Systolic BP (mmHg) 126 134 136 140 135    Diastolic BP (mmHg) 80 114 83 75 87    Pulse 63 65 66 67 71        Hypertension Medications             losartan-hydrochlorothiazide 100-25 mg (HYZAAR) 100-25 mg per tablet Take 1 tablet by mouth every morning.        Plan:   Messaging patient to follow up. Per current 30 day average, BP is well controlled. Will continue to monitor. WCB in 4 months, sooner if BP begins to trend up or down.

## 2017-07-14 ENCOUNTER — PATIENT MESSAGE (OUTPATIENT)
Dept: PSYCHIATRY | Facility: CLINIC | Age: 57
End: 2017-07-14

## 2017-07-14 DIAGNOSIS — F41.9 ANXIETY: ICD-10-CM

## 2017-07-14 RX ORDER — CITALOPRAM 40 MG/1
40 TABLET, FILM COATED ORAL DAILY
Qty: 30 TABLET | Refills: 2 | Status: SHIPPED | OUTPATIENT
Start: 2017-07-14 | End: 2017-10-21 | Stop reason: SDUPTHER

## 2017-07-14 RX ORDER — CITALOPRAM 20 MG/1
TABLET, FILM COATED ORAL
Qty: 135 TABLET | Refills: 0 | OUTPATIENT
Start: 2017-07-14

## 2017-08-01 ENCOUNTER — PATIENT OUTREACH (OUTPATIENT)
Dept: OTHER | Facility: OTHER | Age: 57
End: 2017-08-01

## 2017-08-01 NOTE — PROGRESS NOTES
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 Inquired about how his low sodium diet and exercise is going. Overall, his readings appear to be controlled and he continues taking his readings weekly. Advised pt to call or message back if he has any questions or concerns.    Patient called back and informed me that he is doing great. He is also happy with how his diuretic is working and not causing him and side effects or more of an urge to use the restroom. He continues taking his readings weekly.

## 2017-09-05 DIAGNOSIS — Z12.5 PROSTATE CANCER SCREENING: ICD-10-CM

## 2017-09-05 DIAGNOSIS — E78.5 HYPERLIPIDEMIA, UNSPECIFIED HYPERLIPIDEMIA TYPE: ICD-10-CM

## 2017-09-05 DIAGNOSIS — R73.9 HYPERGLYCEMIA: ICD-10-CM

## 2017-09-05 DIAGNOSIS — R80.9 POSITIVE FOR MICROALBUMINURIA: Primary | ICD-10-CM

## 2017-09-12 ENCOUNTER — LAB VISIT (OUTPATIENT)
Dept: LAB | Facility: HOSPITAL | Age: 57
End: 2017-09-12
Attending: FAMILY MEDICINE
Payer: COMMERCIAL

## 2017-09-12 ENCOUNTER — DOCUMENTATION ONLY (OUTPATIENT)
Dept: FAMILY MEDICINE | Facility: CLINIC | Age: 57
End: 2017-09-12

## 2017-09-12 DIAGNOSIS — Z12.5 PROSTATE CANCER SCREENING: ICD-10-CM

## 2017-09-12 DIAGNOSIS — E78.5 HYPERLIPIDEMIA, UNSPECIFIED HYPERLIPIDEMIA TYPE: ICD-10-CM

## 2017-09-12 DIAGNOSIS — R73.9 HYPERGLYCEMIA: ICD-10-CM

## 2017-09-12 LAB
ALBUMIN SERPL BCP-MCNC: 3.9 G/DL
ALP SERPL-CCNC: 69 U/L
ALT SERPL W/O P-5'-P-CCNC: 37 U/L
ANION GAP SERPL CALC-SCNC: 12 MMOL/L
AST SERPL-CCNC: 27 U/L
BASOPHILS # BLD AUTO: 0.03 K/UL
BASOPHILS NFR BLD: 0.7 %
BILIRUB SERPL-MCNC: 0.3 MG/DL
BUN SERPL-MCNC: 21 MG/DL
CALCIUM SERPL-MCNC: 9.5 MG/DL
CHLORIDE SERPL-SCNC: 101 MMOL/L
CHOLEST SERPL-MCNC: 194 MG/DL
CHOLEST/HDLC SERPL: 3 {RATIO}
CO2 SERPL-SCNC: 27 MMOL/L
COMPLEXED PSA SERPL-MCNC: 2.7 NG/ML
CREAT SERPL-MCNC: 1.2 MG/DL
DIFFERENTIAL METHOD: ABNORMAL
EOSINOPHIL # BLD AUTO: 0.3 K/UL
EOSINOPHIL NFR BLD: 6.3 %
ERYTHROCYTE [DISTWIDTH] IN BLOOD BY AUTOMATED COUNT: 14.3 %
EST. GFR  (AFRICAN AMERICAN): >60 ML/MIN/1.73 M^2
EST. GFR  (NON AFRICAN AMERICAN): >60 ML/MIN/1.73 M^2
ESTIMATED AVG GLUCOSE: 108 MG/DL
GLUCOSE SERPL-MCNC: 97 MG/DL
HBA1C MFR BLD HPLC: 5.4 %
HCT VFR BLD AUTO: 40.9 %
HDLC SERPL-MCNC: 64 MG/DL
HDLC SERPL: 33 %
HGB BLD-MCNC: 13.1 G/DL
LDLC SERPL CALC-MCNC: 121 MG/DL
LYMPHOCYTES # BLD AUTO: 1.7 K/UL
LYMPHOCYTES NFR BLD: 36.2 %
MCH RBC QN AUTO: 27.6 PG
MCHC RBC AUTO-ENTMCNC: 32 G/DL
MCV RBC AUTO: 86 FL
MONOCYTES # BLD AUTO: 0.4 K/UL
MONOCYTES NFR BLD: 8.7 %
NEUTROPHILS # BLD AUTO: 2.2 K/UL
NEUTROPHILS NFR BLD: 47.9 %
NONHDLC SERPL-MCNC: 130 MG/DL
PLATELET # BLD AUTO: 257 K/UL
PMV BLD AUTO: 9.9 FL
POTASSIUM SERPL-SCNC: 4.7 MMOL/L
PROT SERPL-MCNC: 7.7 G/DL
RBC # BLD AUTO: 4.74 M/UL
SODIUM SERPL-SCNC: 140 MMOL/L
TRIGL SERPL-MCNC: 45 MG/DL
WBC # BLD AUTO: 4.58 K/UL

## 2017-09-12 PROCEDURE — 84153 ASSAY OF PSA TOTAL: CPT

## 2017-09-12 PROCEDURE — 85025 COMPLETE CBC W/AUTO DIFF WBC: CPT

## 2017-09-12 PROCEDURE — 80053 COMPREHEN METABOLIC PANEL: CPT

## 2017-09-12 PROCEDURE — 83036 HEMOGLOBIN GLYCOSYLATED A1C: CPT

## 2017-09-12 PROCEDURE — 36415 COLL VENOUS BLD VENIPUNCTURE: CPT | Mod: PO

## 2017-09-12 PROCEDURE — 80061 LIPID PANEL: CPT

## 2017-09-12 NOTE — PROGRESS NOTES
Pre-Visit Chart Review  For Appointment Scheduled on 9-13-17    Health Maintenance Due   Topic Date Due    Influenza Vaccine  08/01/2017

## 2017-09-13 ENCOUNTER — PATIENT MESSAGE (OUTPATIENT)
Dept: PSYCHIATRY | Facility: CLINIC | Age: 57
End: 2017-09-13

## 2017-09-13 ENCOUNTER — OFFICE VISIT (OUTPATIENT)
Dept: FAMILY MEDICINE | Facility: CLINIC | Age: 57
End: 2017-09-13
Payer: COMMERCIAL

## 2017-09-13 VITALS
BODY MASS INDEX: 28.63 KG/M2 | TEMPERATURE: 98 F | SYSTOLIC BLOOD PRESSURE: 125 MMHG | HEART RATE: 69 BPM | DIASTOLIC BLOOD PRESSURE: 78 MMHG | WEIGHT: 188.94 LBS | HEIGHT: 68 IN

## 2017-09-13 DIAGNOSIS — F41.9 CHRONIC ANXIETY: Primary | ICD-10-CM

## 2017-09-13 DIAGNOSIS — Z12.5 PROSTATE CANCER SCREENING: ICD-10-CM

## 2017-09-13 DIAGNOSIS — E78.5 HYPERLIPIDEMIA, UNSPECIFIED HYPERLIPIDEMIA TYPE: ICD-10-CM

## 2017-09-13 DIAGNOSIS — I10 ESSENTIAL HYPERTENSION: ICD-10-CM

## 2017-09-13 DIAGNOSIS — R80.9 POSITIVE FOR MICROALBUMINURIA: ICD-10-CM

## 2017-09-13 DIAGNOSIS — R73.9 HYPERGLYCEMIA: ICD-10-CM

## 2017-09-13 PROCEDURE — 99214 OFFICE O/P EST MOD 30 MIN: CPT | Mod: S$GLB,,, | Performed by: FAMILY MEDICINE

## 2017-09-13 PROCEDURE — 3008F BODY MASS INDEX DOCD: CPT | Mod: S$GLB,,, | Performed by: FAMILY MEDICINE

## 2017-09-13 PROCEDURE — 3074F SYST BP LT 130 MM HG: CPT | Mod: S$GLB,,, | Performed by: FAMILY MEDICINE

## 2017-09-13 PROCEDURE — 3078F DIAST BP <80 MM HG: CPT | Mod: S$GLB,,, | Performed by: FAMILY MEDICINE

## 2017-09-13 PROCEDURE — 99999 PR PBB SHADOW E&M-EST. PATIENT-LVL III: CPT | Mod: PBBFAC,,, | Performed by: FAMILY MEDICINE

## 2017-09-13 RX ORDER — ATORVASTATIN CALCIUM 40 MG/1
40 TABLET, FILM COATED ORAL DAILY
Qty: 90 TABLET | Refills: 3 | Status: SHIPPED | OUTPATIENT
Start: 2017-09-13 | End: 2017-12-04 | Stop reason: SDUPTHER

## 2017-09-13 RX ORDER — LOSARTAN POTASSIUM AND HYDROCHLOROTHIAZIDE 25; 100 MG/1; MG/1
1 TABLET ORAL EVERY MORNING
Qty: 90 TABLET | Refills: 3 | Status: SHIPPED | OUTPATIENT
Start: 2017-09-13 | End: 2017-12-12 | Stop reason: SDUPTHER

## 2017-09-14 ENCOUNTER — TELEPHONE (OUTPATIENT)
Dept: PSYCHIATRY | Facility: CLINIC | Age: 57
End: 2017-09-14

## 2017-09-14 NOTE — TELEPHONE ENCOUNTER
----- Message from Casie Garza MD sent at 9/13/2017  9:53 PM CDT -----  Please contact via My Chart to schedule an appointment for follow up within next month.     Thanks,   Dr Garza

## 2017-09-25 NOTE — PROGRESS NOTES
Subjective:       Patient ID: Parth Juarez is a 57 y.o. male.    Chief Complaint: Follow-up (6 months) and Hypertension    Patient Active Problem List   Diagnosis    Hyperlipidemia    Good hypertension control    Exercise-induced asthma    Positive for microalbuminuria    Chronic anxiety    Hyperglycemia     HPI  Review of Systems   Constitutional: Negative for fatigue and unexpected weight change.   Respiratory: Negative for chest tightness and shortness of breath.    Cardiovascular: Negative for chest pain, palpitations and leg swelling.   Gastrointestinal: Negative for abdominal pain.   Musculoskeletal: Negative for arthralgias.   Neurological: Negative for dizziness, syncope, light-headedness and headaches.       Objective:      Physical Exam   Constitutional: He is oriented to person, place, and time. He appears well-developed and well-nourished.   Cardiovascular: Normal rate, regular rhythm and normal heart sounds.    Pulmonary/Chest: Effort normal and breath sounds normal.   Musculoskeletal: He exhibits no edema.   Neurological: He is alert and oriented to person, place, and time.   Skin: Skin is warm and dry.   Psychiatric: He has a normal mood and affect.   Nursing note and vitals reviewed.      Assessment:       1. Chronic anxiety    2. Essential hypertension    3. Hyperlipidemia, unspecified hyperlipidemia type    4. Positive for microalbuminuria    5. Hyperglycemia    6. Prostate cancer screening        Plan:       1. Essential hypertension  Controlled on current medications.  Continue current medications.    - losartan-hydrochlorothiazide 100-25 mg (HYZAAR) 100-25 mg per tablet; Take 1 tablet by mouth every morning.  Dispense: 90 tablet; Refill: 3    2. Chronic anxiety  Refer for eval and treat  - Ambulatory referral to Psychiatry    3. Hyperlipidemia, unspecified hyperlipidemia type  Controlled on current medications.  Continue current medications.  - atorvastatin (LIPITOR) 40 MG tablet; Take 1  tablet (40 mg total) by mouth once daily.  Dispense: 90 tablet; Refill: 3  - CBC auto differential; Future  - Comprehensive metabolic panel; Future  - Lipid panel; Future    4. Positive for microalbuminuria  Improved.  On arb  - Microalbumin/creatinine urine ratio; Future    5. Hyperglycemia  Controlled on current medications.  Continue current medications.    - Hemoglobin A1c; Future    6. Prostate cancer screening  Screen and treat as indicated:  Cont urology monitoring  - PSA, Screening; Future    [facility] goal documentation:  Patient readiness: eager and barriers:readiness  During the course of the visit the patient was educated and counseled about the following: Hypertension:   Dietary sodium restriction.  Regular aerobic exercise.  Goals: Hypertension: Reduce Blood Pressure  Goal/Outcomes met:Hypertension  The following self management tools provided:none  Patient Instructions (the written plan) was given to the patient/family: Yes  Time spent with patient: 20 minutes    Patient with be reevaluated in 6 months or sooner prn    Greater than 50% of this visit was spent counseling as described in above documentation:Yes

## 2017-10-06 ENCOUNTER — PATIENT OUTREACH (OUTPATIENT)
Dept: OTHER | Facility: OTHER | Age: 57
End: 2017-10-06

## 2017-10-06 NOTE — PROGRESS NOTES
Last 5 Patient Entered Redings Current 30 Day Average: 133/80     Recent Readings 9/26/2017 9/25/2017 9/25/2017 9/21/2017 9/15/2017    Systolic BP (mmHg) 133 136 144 122 139    Diastolic BP (mmHg) 81 77 83 80 87    Pulse 64 70 69 69 64        Hypertension Medications             losartan-hydrochlorothiazide 100-25 mg (HYZAAR) 100-25 mg per tablet Take 1 tablet by mouth every morning.        Plan:   Called patient to follow up. Per current 30 day average, BP is well controlled.   LVM, requested patient call back if hehas any questions or concerns.   Will continue to monitor. WCB in 4 months, sooner if BP begins to trend up or down.

## 2017-10-21 DIAGNOSIS — F41.9 CHRONIC ANXIETY: Primary | ICD-10-CM

## 2017-10-21 RX ORDER — CITALOPRAM 40 MG/1
40 TABLET, FILM COATED ORAL DAILY
Qty: 90 TABLET | Refills: 0 | Status: SHIPPED | OUTPATIENT
Start: 2017-10-21 | End: 2018-01-29 | Stop reason: SDUPTHER

## 2017-10-24 ENCOUNTER — TELEPHONE (OUTPATIENT)
Dept: PSYCHIATRY | Facility: CLINIC | Age: 57
End: 2017-10-24

## 2017-11-07 ENCOUNTER — PATIENT MESSAGE (OUTPATIENT)
Dept: PSYCHIATRY | Facility: CLINIC | Age: 57
End: 2017-11-07

## 2017-11-20 ENCOUNTER — PATIENT OUTREACH (OUTPATIENT)
Dept: OTHER | Facility: OTHER | Age: 57
End: 2017-11-20

## 2017-11-20 NOTE — PROGRESS NOTES
Last 5 Patient Entered Readings Current 30 Day Average: 126/79     Recent Readings 11/20/2017 11/16/2017 11/15/2017 11/13/2017 11/7/2017    Systolic BP (mmHg) 119 122 126 124 128    Diastolic BP (mmHg) 76 73 75 79 89    Pulse 73 69 64 65 71          Hypertension Digital Medicine Program (HDMP): Health  Follow Up    Lifestyle Modifications:    1.Low sodium diet: yes : Patient continues maintaining a low sodium diet (reading food labels, avoiding extra salt, etc). No major changes in his diet recently.     2.Physical activity: yes : Patient working on increasing his walking and physical activity.     3.Hypotension/Hypertension symptoms: no   Frequency/Alleviating factors/Precipitating factors, etc.     4.Patient has been compliant with the medication regimen.     Follow up with Dr. Parth Juarez completed. No further questions or concerns. I will follow up in a few weeks to assess progress.

## 2017-11-29 ENCOUNTER — PATIENT MESSAGE (OUTPATIENT)
Dept: ADMINISTRATIVE | Facility: OTHER | Age: 57
End: 2017-11-29

## 2017-11-29 ENCOUNTER — OFFICE VISIT (OUTPATIENT)
Dept: PSYCHIATRY | Facility: CLINIC | Age: 57
End: 2017-11-29
Payer: COMMERCIAL

## 2017-11-29 ENCOUNTER — PATIENT MESSAGE (OUTPATIENT)
Dept: PSYCHIATRY | Facility: CLINIC | Age: 57
End: 2017-11-29

## 2017-11-29 VITALS
SYSTOLIC BLOOD PRESSURE: 125 MMHG | DIASTOLIC BLOOD PRESSURE: 80 MMHG | BODY MASS INDEX: 28.4 KG/M2 | WEIGHT: 187.38 LBS | HEIGHT: 68 IN | HEART RATE: 65 BPM

## 2017-11-29 DIAGNOSIS — F41.9 CHRONIC ANXIETY: Primary | ICD-10-CM

## 2017-11-29 DIAGNOSIS — F98.8 ATTENTION DEFICIT DISORDER, UNSPECIFIED HYPERACTIVITY PRESENCE: ICD-10-CM

## 2017-11-29 DIAGNOSIS — F90.0 ATTENTION DEFICIT HYPERACTIVITY DISORDER (ADHD), PREDOMINANTLY INATTENTIVE TYPE: ICD-10-CM

## 2017-11-29 DIAGNOSIS — F41.9 CHRONIC ANXIETY: ICD-10-CM

## 2017-11-29 DIAGNOSIS — Z56.6 WORK-RELATED STRESS: ICD-10-CM

## 2017-11-29 PROCEDURE — 99213 OFFICE O/P EST LOW 20 MIN: CPT | Mod: S$GLB,,, | Performed by: PSYCHIATRY & NEUROLOGY

## 2017-11-29 PROCEDURE — 90833 PSYTX W PT W E/M 30 MIN: CPT | Mod: S$GLB,,, | Performed by: PSYCHIATRY & NEUROLOGY

## 2017-11-29 PROCEDURE — 99999 PR PBB SHADOW E&M-EST. PATIENT-LVL III: CPT | Mod: PBBFAC,,, | Performed by: PSYCHIATRY & NEUROLOGY

## 2017-11-29 RX ORDER — DEXTROAMPHETAMINE SACCHARATE, AMPHETAMINE ASPARTATE, DEXTROAMPHETAMINE SULFATE AND AMPHETAMINE SULFATE 1.25; 1.25; 1.25; 1.25 MG/1; MG/1; MG/1; MG/1
5 TABLET ORAL 2 TIMES DAILY
Qty: 60 TABLET | Refills: 0 | Status: SHIPPED | OUTPATIENT
Start: 2017-11-29 | End: 2017-11-30 | Stop reason: SDUPTHER

## 2017-11-29 SDOH — SOCIAL DETERMINANTS OF HEALTH (SDOH): OTHER PHYSICAL AND MENTAL STRAIN RELATED TO WORK: Z56.6
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
## 2017-11-30 ENCOUNTER — PATIENT MESSAGE (OUTPATIENT)
Dept: FAMILY MEDICINE | Facility: CLINIC | Age: 57
End: 2017-11-30

## 2017-11-30 RX ORDER — DEXTROAMPHETAMINE SACCHARATE, AMPHETAMINE ASPARTATE, DEXTROAMPHETAMINE SULFATE AND AMPHETAMINE SULFATE 1.25; 1.25; 1.25; 1.25 MG/1; MG/1; MG/1; MG/1
5 TABLET ORAL 2 TIMES DAILY
Qty: 60 TABLET | Refills: 0 | Status: SHIPPED | OUTPATIENT
Start: 2017-11-30 | End: 2018-01-03 | Stop reason: DRUGHIGH

## 2017-12-04 DIAGNOSIS — E78.5 HYPERLIPIDEMIA, UNSPECIFIED HYPERLIPIDEMIA TYPE: ICD-10-CM

## 2017-12-05 RX ORDER — ATORVASTATIN CALCIUM 40 MG/1
40 TABLET, FILM COATED ORAL DAILY
Qty: 90 TABLET | Refills: 3 | Status: SHIPPED | OUTPATIENT
Start: 2017-12-05 | End: 2019-01-28 | Stop reason: SDUPTHER

## 2017-12-12 ENCOUNTER — PATIENT MESSAGE (OUTPATIENT)
Dept: PSYCHIATRY | Facility: CLINIC | Age: 57
End: 2017-12-12

## 2017-12-12 ENCOUNTER — PATIENT OUTREACH (OUTPATIENT)
Dept: OTHER | Facility: OTHER | Age: 57
End: 2017-12-12

## 2017-12-12 DIAGNOSIS — I10 ESSENTIAL HYPERTENSION: ICD-10-CM

## 2017-12-12 DIAGNOSIS — F90.2 ADHD (ATTENTION DEFICIT HYPERACTIVITY DISORDER), COMBINED TYPE: Primary | ICD-10-CM

## 2017-12-12 DIAGNOSIS — F41.9 ANXIETY: ICD-10-CM

## 2017-12-12 RX ORDER — LOSARTAN POTASSIUM AND HYDROCHLOROTHIAZIDE 25; 100 MG/1; MG/1
1 TABLET ORAL EVERY MORNING
Qty: 90 TABLET | Refills: 3 | Status: SHIPPED | OUTPATIENT
Start: 2017-12-12 | End: 2018-12-31 | Stop reason: SDUPTHER

## 2017-12-12 NOTE — PROGRESS NOTES
Last 5 Patient Entered Readings Current 30 Day Average: 126/78     Recent Readings 11/22/2017 11/20/2017 11/16/2017 11/15/2017 11/13/2017    Systolic BP (mmHg) 137 119 122 126 124    Diastolic BP (mmHg) 85 76 73 75 79    Pulse 66 73 69 64 65        Messaging patient regarding lack of readings.

## 2017-12-26 NOTE — PROGRESS NOTES
Last 5 Patient Entered Readings Current 30 Day Average: 135/78     Recent Readings 12/19/2017 12/15/2017 12/15/2017 11/22/2017 11/20/2017    SBP (mmHg) 128 142 159 137 119    DBP (mmHg) 72 84 90 85 76    Pulse 75 60 67 66 73        Hypertension Medications             losartan-hydrochlorothiazide 100-25 mg (HYZAAR) 100-25 mg per tablet Take 1 tablet by mouth every morning.        Plan:   Called patient to follow up. Per newly released 2017 ACC/ AHA HTN guidelines  (goal of BP < 130/80), current 30-day average is uncontrolled.  LVM, requested patient call back at his convenience if he has any questions or concerns, and to continue to take at least weekly BP readings.   Will continue to monitor. WCB in 1 month.   If BP remains elevated, would like to discuss changing losartan hctz 100/25 to valsartan hctz 320/25 or valsartan 320 + chlorthalidone 25.

## 2018-01-03 ENCOUNTER — OFFICE VISIT (OUTPATIENT)
Dept: PSYCHIATRY | Facility: CLINIC | Age: 58
End: 2018-01-03
Payer: COMMERCIAL

## 2018-01-03 ENCOUNTER — LAB VISIT (OUTPATIENT)
Dept: LAB | Facility: HOSPITAL | Age: 58
End: 2018-01-03
Attending: PSYCHIATRY & NEUROLOGY
Payer: COMMERCIAL

## 2018-01-03 VITALS
WEIGHT: 182.13 LBS | HEART RATE: 67 BPM | HEIGHT: 67 IN | SYSTOLIC BLOOD PRESSURE: 152 MMHG | DIASTOLIC BLOOD PRESSURE: 87 MMHG | BODY MASS INDEX: 28.58 KG/M2

## 2018-01-03 DIAGNOSIS — F90.0 ATTENTION DEFICIT HYPERACTIVITY DISORDER (ADHD), PREDOMINANTLY INATTENTIVE TYPE: ICD-10-CM

## 2018-01-03 DIAGNOSIS — F41.9 ANXIETY: ICD-10-CM

## 2018-01-03 DIAGNOSIS — F90.2 ADHD (ATTENTION DEFICIT HYPERACTIVITY DISORDER), COMBINED TYPE: ICD-10-CM

## 2018-01-03 DIAGNOSIS — J01.00 ACUTE MAXILLARY SINUSITIS, RECURRENCE NOT SPECIFIED: Primary | ICD-10-CM

## 2018-01-03 LAB — TSH SERPL DL<=0.005 MIU/L-ACNC: 2.08 UIU/ML

## 2018-01-03 PROCEDURE — 84443 ASSAY THYROID STIM HORMONE: CPT

## 2018-01-03 PROCEDURE — 93005 ELECTROCARDIOGRAM TRACING: CPT | Mod: S$GLB,,, | Performed by: PSYCHIATRY & NEUROLOGY

## 2018-01-03 PROCEDURE — 36415 COLL VENOUS BLD VENIPUNCTURE: CPT | Mod: PO

## 2018-01-03 PROCEDURE — 99999 PR PBB SHADOW E&M-EST. PATIENT-LVL III: CPT | Mod: PBBFAC,,, | Performed by: PSYCHIATRY & NEUROLOGY

## 2018-01-03 PROCEDURE — 99213 OFFICE O/P EST LOW 20 MIN: CPT | Mod: S$GLB,,, | Performed by: PSYCHIATRY & NEUROLOGY

## 2018-01-03 PROCEDURE — 93010 ELECTROCARDIOGRAM REPORT: CPT | Mod: S$GLB,,, | Performed by: INTERNAL MEDICINE

## 2018-01-03 RX ORDER — DEXTROAMPHETAMINE SACCHARATE, AMPHETAMINE ASPARTATE MONOHYDRATE, DEXTROAMPHETAMINE SULFATE AND AMPHETAMINE SULFATE 3.75; 3.75; 3.75; 3.75 MG/1; MG/1; MG/1; MG/1
15 CAPSULE, EXTENDED RELEASE ORAL EVERY MORNING
Qty: 30 CAPSULE | Refills: 0 | Status: SHIPPED | OUTPATIENT
Start: 2018-01-03 | End: 2018-01-29 | Stop reason: SDUPTHER

## 2018-01-03 RX ORDER — AMOXICILLIN AND CLAVULANATE POTASSIUM 875; 125 MG/1; MG/1
1 TABLET, FILM COATED ORAL 2 TIMES DAILY
Qty: 20 TABLET | Refills: 0 | Status: SHIPPED | OUTPATIENT
Start: 2018-01-03 | End: 2018-01-13

## 2018-01-03 NOTE — TELEPHONE ENCOUNTER
Patient complains of sinus pressure and thick sinus drainage x 5 days. Patient requests Rx for Augmentin. Rx sent.

## 2018-01-03 NOTE — Clinical Note
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  He is struggling with issues of focus, motivation, and adapting to demands that have increased in his career - resulting in difficulty keeping up with charting, lab results - feeling disconnected from his practice.  Management has recommended that he have a consultation with you.  Do you have availability in your schedule to see him?  Thanks in advance.
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
## 2018-01-10 ENCOUNTER — PATIENT MESSAGE (OUTPATIENT)
Dept: PSYCHIATRY | Facility: CLINIC | Age: 58
End: 2018-01-10

## 2018-01-16 ENCOUNTER — PATIENT OUTREACH (OUTPATIENT)
Dept: OTHER | Facility: OTHER | Age: 58
End: 2018-01-16

## 2018-01-16 NOTE — PROGRESS NOTES
Last 5 Patient Entered Readings                                      Current 30 Day Average: 131/84     Recent Readings 1/10/2018 1/10/2018 1/10/2018 1/10/2018 1/4/2018    SBP (mmHg) 144 150 142 142 121    DBP (mmHg) 98 101 94 94 82    Pulse 63 65 68 68 66          Hypertension Digital Medicine (HDMP) Health  Follow Up    LVM to follow up with Dr. Parth Juarez.    Per 30 day average, blood pressure is borderline controlled 131/84 mmHg. Encouraged adherence to low sodium diet and physical activity guidelines. Requested patient call or message back so we can discuss his readings/obtain an update. Will continue to monitor/follow up.

## 2018-01-23 NOTE — PROGRESS NOTES
Last 5 Patient Entered Readings                                      Current 30 Day Average: 134/87     Recent Readings 1/23/2018 1/22/2018 1/10/2018 1/10/2018 1/10/2018    SBP (mmHg) 133 136 144 150 142    DBP (mmHg) 86 83 98 101 94    Pulse 74 78 63 65 68        Hypertension Medications             losartan-hydrochlorothiazide 100-25 mg (HYZAAR) 100-25 mg per tablet Take 1 tablet by mouth every morning.        Plan:   Called patient to follow up. Per newly released 2017 ACC/ AHA HTN guidelines  (goal of BP < 130/80), current 30-day average is uncontrolled.  LVM, 2nd attempt, requested patient call back at his convenience if he has any questions or concerns, and to continue to take at least weekly BP readings.   Will continue to monitor. WCB in 1 month.   If BP remains elevated, would like to discuss changing losartan hctz 100/25 to valsartan hctz 320/25 or valsartan 320 + chlorthalidone 25.

## 2018-01-25 ENCOUNTER — PATIENT MESSAGE (OUTPATIENT)
Dept: PSYCHIATRY | Facility: CLINIC | Age: 58
End: 2018-01-25

## 2018-01-25 ENCOUNTER — PATIENT MESSAGE (OUTPATIENT)
Dept: OPTOMETRY | Facility: CLINIC | Age: 58
End: 2018-01-25

## 2018-01-28 ENCOUNTER — PATIENT MESSAGE (OUTPATIENT)
Dept: PSYCHIATRY | Facility: CLINIC | Age: 58
End: 2018-01-28

## 2018-01-29 ENCOUNTER — PATIENT MESSAGE (OUTPATIENT)
Dept: OPTOMETRY | Facility: CLINIC | Age: 58
End: 2018-01-29

## 2018-01-29 DIAGNOSIS — F41.9 CHRONIC ANXIETY: ICD-10-CM

## 2018-01-29 RX ORDER — DEXTROAMPHETAMINE SACCHARATE, AMPHETAMINE ASPARTATE MONOHYDRATE, DEXTROAMPHETAMINE SULFATE AND AMPHETAMINE SULFATE 3.75; 3.75; 3.75; 3.75 MG/1; MG/1; MG/1; MG/1
15 CAPSULE, EXTENDED RELEASE ORAL EVERY MORNING
Qty: 30 CAPSULE | Refills: 0 | Status: SHIPPED | OUTPATIENT
Start: 2018-01-29 | End: 2018-03-08 | Stop reason: SDUPTHER

## 2018-01-29 RX ORDER — CITALOPRAM 40 MG/1
40 TABLET, FILM COATED ORAL DAILY
Qty: 90 TABLET | Refills: 3 | Status: SHIPPED | OUTPATIENT
Start: 2018-01-29 | End: 2018-06-27 | Stop reason: SDUPTHER

## 2018-02-07 ENCOUNTER — OFFICE VISIT (OUTPATIENT)
Dept: PSYCHIATRY | Facility: CLINIC | Age: 58
End: 2018-02-07
Payer: COMMERCIAL

## 2018-02-07 ENCOUNTER — OFFICE VISIT (OUTPATIENT)
Dept: OPTOMETRY | Facility: CLINIC | Age: 58
End: 2018-02-07
Payer: COMMERCIAL

## 2018-02-07 VITALS
SYSTOLIC BLOOD PRESSURE: 132 MMHG | HEART RATE: 70 BPM | WEIGHT: 179.88 LBS | DIASTOLIC BLOOD PRESSURE: 86 MMHG | BODY MASS INDEX: 28.23 KG/M2 | HEIGHT: 67 IN

## 2018-02-07 DIAGNOSIS — F90.2 ADHD (ATTENTION DEFICIT HYPERACTIVITY DISORDER), COMBINED TYPE: ICD-10-CM

## 2018-02-07 DIAGNOSIS — H52.7 REFRACTIVE ERROR: ICD-10-CM

## 2018-02-07 DIAGNOSIS — F41.9 CHRONIC ANXIETY: ICD-10-CM

## 2018-02-07 DIAGNOSIS — H26.9 CORTICAL CATARACT: ICD-10-CM

## 2018-02-07 DIAGNOSIS — H25.13 NUCLEAR SCLEROSIS, BILATERAL: Primary | ICD-10-CM

## 2018-02-07 DIAGNOSIS — H04.123 DRY EYE SYNDROME, BILATERAL: ICD-10-CM

## 2018-02-07 PROCEDURE — 3008F BODY MASS INDEX DOCD: CPT | Mod: S$GLB,,, | Performed by: PSYCHIATRY & NEUROLOGY

## 2018-02-07 PROCEDURE — 92015 DETERMINE REFRACTIVE STATE: CPT | Mod: S$GLB,,, | Performed by: OPTOMETRIST

## 2018-02-07 PROCEDURE — 99999 PR PBB SHADOW E&M-EST. PATIENT-LVL III: CPT | Mod: PBBFAC,,, | Performed by: PSYCHIATRY & NEUROLOGY

## 2018-02-07 PROCEDURE — 99213 OFFICE O/P EST LOW 20 MIN: CPT | Mod: S$GLB,,, | Performed by: PSYCHIATRY & NEUROLOGY

## 2018-02-07 PROCEDURE — 92014 COMPRE OPH EXAM EST PT 1/>: CPT | Mod: S$GLB,,, | Performed by: OPTOMETRIST

## 2018-02-07 PROCEDURE — 99999 PR PBB SHADOW E&M-EST. PATIENT-LVL II: CPT | Mod: PBBFAC,,, | Performed by: OPTOMETRIST
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
## 2018-02-08 ENCOUNTER — PATIENT OUTREACH (OUTPATIENT)
Dept: OTHER | Facility: OTHER | Age: 58
End: 2018-02-08

## 2018-02-08 NOTE — PROGRESS NOTES
"Last 5 Patient Entered Readings                                      Current 30 Day Average: 138/87     Recent Readings 2/6/2018 1/25/2018 1/23/2018 1/22/2018 1/10/2018    SBP (mmHg) 134 145 133 136 144    DBP (mmHg) 73 95 86 83 98    Pulse 69 59 74 78 63          Hypertension Digital Medicine (HDMP) Health  Follow Up    Sent [employer] message to follow up with Dr. Parth Juarez.    Per 30 day average, blood pressure is not well controlled 138/87 mmHg. Encouraged adherence to low sodium diet and physical activity guidelines. Requested patient call or message back so we can discuss his readings/obtain an update. Will continue to monitor/follow up.     2/13 - Patient responded back with the following:   "Yes I had been eaten the wrong cuisine.I am back with better numbers. "  -Encouraged a low sodium diet and requested that he reach out with any further questions or concerns.   "

## 2018-02-14 ENCOUNTER — PATIENT MESSAGE (OUTPATIENT)
Dept: OTHER | Facility: OTHER | Age: 58
End: 2018-02-14

## 2018-02-20 NOTE — PROGRESS NOTES
Last 5 Patient Entered Readings                                      Current 30 Day Average: 128/79     Recent Readings 2/20/2018 2/19/2018 2/19/2018 2/19/2018 2/18/2018    SBP (mmHg) 124 123 122 124 123    DBP (mmHg) 77 82 80 108 77    Pulse 64 68 71 72 66        Hypertension Medications             losartan-hydrochlorothiazide 100-25 mg (HYZAAR) 100-25 mg per tablet Take 1 tablet by mouth every morning.        Assessment/ Plan:   Called patient to follow up.   Per newly released 2017 ACC/ AHA HTN guidelines (goal of BP < 130/80), current 30-day average is controlled.    Patient denies having questions or concerns. Instructed patient to call if he has any questions or concerns, patient confirms understanding.   Will continue to monitor. WCB in 3 months, sooner if BP begins to trend up or down.

## 2018-02-23 ENCOUNTER — OFFICE VISIT (OUTPATIENT)
Dept: FAMILY MEDICINE | Facility: CLINIC | Age: 58
End: 2018-02-23
Payer: COMMERCIAL

## 2018-02-23 DIAGNOSIS — J06.9 UPPER RESPIRATORY TRACT INFECTION, UNSPECIFIED TYPE: ICD-10-CM

## 2018-02-23 DIAGNOSIS — R05.9 COUGH: Primary | ICD-10-CM

## 2018-02-23 PROCEDURE — 99999 PR PBB SHADOW E&M-EST. PATIENT-LVL III: CPT | Mod: PBBFAC,,, | Performed by: PHYSICIAN ASSISTANT

## 2018-02-23 PROCEDURE — 3074F SYST BP LT 130 MM HG: CPT | Mod: CPTII,S$GLB,, | Performed by: PHYSICIAN ASSISTANT

## 2018-02-23 PROCEDURE — 99213 OFFICE O/P EST LOW 20 MIN: CPT | Mod: S$GLB,,, | Performed by: PHYSICIAN ASSISTANT

## 2018-02-23 PROCEDURE — 3078F DIAST BP <80 MM HG: CPT | Mod: CPTII,S$GLB,, | Performed by: PHYSICIAN ASSISTANT

## 2018-02-23 RX ORDER — BENZONATATE 200 MG/1
200 CAPSULE ORAL 3 TIMES DAILY PRN
Qty: 30 CAPSULE | Refills: 0 | Status: SHIPPED | OUTPATIENT
Start: 2018-02-23 | End: 2018-03-05

## 2018-02-23 RX ORDER — METHYLPREDNISOLONE 4 MG/1
4 TABLET ORAL DAILY
Qty: 21 TABLET | Refills: 0 | Status: SHIPPED | OUTPATIENT
Start: 2018-02-23 | End: 2018-03-21

## 2018-02-23 NOTE — PROGRESS NOTES
Subjective:       Patient ID: Parth Juarez is a 57 y.o. male.    Chief Complaint: Cough    Cough   This is a new problem. The current episode started 1 to 4 weeks ago. The problem has been gradually worsening. The cough is productive of purulent sputum. Associated symptoms include postnasal drip and rhinorrhea. Pertinent negatives include no chest pain, chills, fever, headaches, heartburn, hemoptysis, myalgias, shortness of breath, weight loss or wheezing. Nothing aggravates the symptoms. He has tried a beta-agonist inhaler and OTC cough suppressant for the symptoms. His past medical history is significant for asthma.     Review of Systems   Constitutional: Negative for activity change, appetite change, chills, fever and weight loss.   HENT: Positive for postnasal drip and rhinorrhea. Negative for sinus pressure.    Eyes: Negative for visual disturbance.   Respiratory: Positive for cough. Negative for hemoptysis, shortness of breath and wheezing.    Cardiovascular: Negative for chest pain.   Gastrointestinal: Negative for abdominal distention, abdominal pain and heartburn.   Genitourinary: Negative for difficulty urinating and dysuria.   Musculoskeletal: Negative for arthralgias and myalgias.   Neurological: Negative for headaches.   Hematological: Negative for adenopathy.   Psychiatric/Behavioral: The patient is not nervous/anxious.        Objective:      Physical Exam   Constitutional: He is oriented to person, place, and time.   HENT:   Mouth/Throat: No oropharyngeal exudate.   Posterior oropharynx mildly erythematous with post nasal drip present   Eyes: Conjunctivae are normal. Pupils are equal, round, and reactive to light.   Cardiovascular: Normal rate and regular rhythm.    Pulmonary/Chest: Effort normal and breath sounds normal. He has no wheezes.   Musculoskeletal: He exhibits no edema.   Lymphadenopathy:     He has no cervical adenopathy.   Neurological: He is alert and oriented to person, place, and time.    Skin: No erythema.   Psychiatric: His behavior is normal.       Assessment:       1. Cough    2. Upper respiratory tract infection, unspecified type        Plan:   Parth was seen today for cough.    Diagnoses and all orders for this visit:    Cough  -     methylPREDNISolone (MEDROL, ARPIT,) 4 mg tablet; Take 1 tablet (4 mg total) by mouth once daily. follow package directions  -     benzonatate (TESSALON) 200 MG capsule; Take 1 capsule (200 mg total) by mouth 3 (three) times daily as needed.    Upper respiratory tract infection, unspecified type  -     methylPREDNISolone (MEDROL, ARPIT,) 4 mg tablet; Take 1 tablet (4 mg total) by mouth once daily. follow package directions

## 2018-02-27 VITALS
BODY MASS INDEX: 27.55 KG/M2 | DIASTOLIC BLOOD PRESSURE: 66 MMHG | HEART RATE: 74 BPM | HEIGHT: 67 IN | WEIGHT: 175.5 LBS | TEMPERATURE: 98 F | SYSTOLIC BLOOD PRESSURE: 117 MMHG

## 2018-03-08 ENCOUNTER — PATIENT MESSAGE (OUTPATIENT)
Dept: UROLOGY | Facility: CLINIC | Age: 58
End: 2018-03-08

## 2018-03-08 DIAGNOSIS — R97.20 ELEVATED PSA: ICD-10-CM

## 2018-03-08 RX ORDER — FINASTERIDE 5 MG/1
5 TABLET, FILM COATED ORAL DAILY
Qty: 90 TABLET | Refills: 0 | Status: SHIPPED | OUTPATIENT
Start: 2018-03-08 | End: 2018-06-27 | Stop reason: SDUPTHER

## 2018-03-08 RX ORDER — DEXTROAMPHETAMINE SACCHARATE, AMPHETAMINE ASPARTATE MONOHYDRATE, DEXTROAMPHETAMINE SULFATE AND AMPHETAMINE SULFATE 3.75; 3.75; 3.75; 3.75 MG/1; MG/1; MG/1; MG/1
15 CAPSULE, EXTENDED RELEASE ORAL EVERY MORNING
Qty: 30 CAPSULE | Refills: 0 | Status: SHIPPED | OUTPATIENT
Start: 2018-03-08 | End: 2018-04-09 | Stop reason: SDUPTHER

## 2018-03-08 NOTE — TELEPHONE ENCOUNTER
Continued Stay Note  [facility]     Patient Name: Emil Pulido  MRN: [id number]  Today's Date: 3/24/2017    Admit Date: 3/17/2017          Discharge Plan       03/24/17 0859    Case Management/Social Work Plan    Plan Plan [facility] or [facility] for skilled care based on bed availability.  MAGGIE Cruz    Additional Comments Message left for CCP to call pt's wife. Spoke with pt's wife (Juliet [phone]) per phone. Juliet states first choice for rehab would be [facility].  Jackie ([phone]) called to inquire about bed availability for discharge today.  Pt's wife (Juliet) states second choice would be [facility].  Call to  Maddy  ( [phone]) for bed availability at [facility] if [facility] does not have a bed.  Plan [facility] or [facility] for skilled care based on bed availability.  MAGGIE Cruz.              Discharge Codes     None            Kristin More, MAGGIE     Message sent via [employer]

## 2018-03-08 NOTE — TELEPHONE ENCOUNTER
Patient is having some trouble with his BPH.  He would like to get back on the Finasteride 5mg.  Please send a refill to his pharmacy.  Will schedule patient for visit to see Dr. Crowell.

## 2018-03-20 ENCOUNTER — DOCUMENTATION ONLY (OUTPATIENT)
Dept: FAMILY MEDICINE | Facility: CLINIC | Age: 58
End: 2018-03-20

## 2018-03-20 NOTE — PROGRESS NOTES
Pre-Visit Chart Review  For Appointment Scheduled on 3-21-18    There are no preventive care reminders to display for this patient.

## 2018-03-21 ENCOUNTER — DOCUMENTATION ONLY (OUTPATIENT)
Dept: FAMILY MEDICINE | Facility: CLINIC | Age: 58
End: 2018-03-21

## 2018-03-21 ENCOUNTER — PATIENT MESSAGE (OUTPATIENT)
Dept: FAMILY MEDICINE | Facility: CLINIC | Age: 58
End: 2018-03-21

## 2018-03-21 ENCOUNTER — LAB VISIT (OUTPATIENT)
Dept: LAB | Facility: HOSPITAL | Age: 58
End: 2018-03-21
Attending: FAMILY MEDICINE
Payer: COMMERCIAL

## 2018-03-21 ENCOUNTER — OFFICE VISIT (OUTPATIENT)
Dept: FAMILY MEDICINE | Facility: CLINIC | Age: 58
End: 2018-03-21
Payer: COMMERCIAL

## 2018-03-21 ENCOUNTER — PATIENT MESSAGE (OUTPATIENT)
Dept: UROLOGY | Facility: CLINIC | Age: 58
End: 2018-03-21

## 2018-03-21 VITALS
DIASTOLIC BLOOD PRESSURE: 76 MMHG | TEMPERATURE: 98 F | WEIGHT: 174.19 LBS | HEART RATE: 68 BPM | SYSTOLIC BLOOD PRESSURE: 127 MMHG | BODY MASS INDEX: 27.34 KG/M2 | HEIGHT: 67 IN

## 2018-03-21 DIAGNOSIS — Z56.6 WORK-RELATED STRESS: ICD-10-CM

## 2018-03-21 DIAGNOSIS — E78.5 HYPERLIPIDEMIA, UNSPECIFIED HYPERLIPIDEMIA TYPE: ICD-10-CM

## 2018-03-21 DIAGNOSIS — F41.9 CHRONIC ANXIETY: ICD-10-CM

## 2018-03-21 DIAGNOSIS — R97.20 ELEVATED PSA: ICD-10-CM

## 2018-03-21 DIAGNOSIS — F98.8 ATTENTION DEFICIT DISORDER, UNSPECIFIED HYPERACTIVITY PRESENCE: ICD-10-CM

## 2018-03-21 DIAGNOSIS — R80.9 POSITIVE FOR MICROALBUMINURIA: ICD-10-CM

## 2018-03-21 DIAGNOSIS — I10 GOOD HYPERTENSION CONTROL: ICD-10-CM

## 2018-03-21 DIAGNOSIS — R73.9 HYPERGLYCEMIA: ICD-10-CM

## 2018-03-21 DIAGNOSIS — Z12.5 SCREENING PSA (PROSTATE SPECIFIC ANTIGEN): ICD-10-CM

## 2018-03-21 DIAGNOSIS — D64.9 ANEMIA, UNSPECIFIED TYPE: Primary | ICD-10-CM

## 2018-03-21 LAB
CREAT UR-MCNC: 167 MG/DL
MICROALBUMIN UR DL<=1MG/L-MCNC: 81 UG/ML
MICROALBUMIN/CREATININE RATIO: 48.5 UG/MG

## 2018-03-21 PROCEDURE — 99999 PR PBB SHADOW E&M-EST. PATIENT-LVL III: CPT | Mod: PBBFAC,,, | Performed by: FAMILY MEDICINE

## 2018-03-21 PROCEDURE — 99214 OFFICE O/P EST MOD 30 MIN: CPT | Mod: S$GLB,,, | Performed by: FAMILY MEDICINE

## 2018-03-21 PROCEDURE — 3074F SYST BP LT 130 MM HG: CPT | Mod: CPTII,S$GLB,, | Performed by: FAMILY MEDICINE

## 2018-03-21 PROCEDURE — 82043 UR ALBUMIN QUANTITATIVE: CPT

## 2018-03-21 PROCEDURE — 3078F DIAST BP <80 MM HG: CPT | Mod: CPTII,S$GLB,, | Performed by: FAMILY MEDICINE

## 2018-03-21 SDOH — SOCIAL DETERMINANTS OF HEALTH (SDOH): OTHER PHYSICAL AND MENTAL STRAIN RELATED TO WORK: Z56.6

## 2018-03-21 NOTE — PROGRESS NOTES
Subjective:       Patient ID: Parth Juarez is a 58 y.o. male.    Chief Complaint: Annual Exam    Patient Active Problem List   Diagnosis    Hyperlipidemia    Good hypertension control    Exercise-induced asthma    Positive for microalbuminuria    Chronic anxiety    Hyperglycemia    Screening PSA (prostate specific antigen)    Attention deficit disorder    Anemia   Weight down from 188 to 174 since 9/17    Seeing psych for ADD.  Meds not helping much for focus and performance improvement at work per pat    Mood is fair.  Stress at home.  Celexa causing ED.  Not interested in switching medsDictation #1  MRN:[id number]  CSN:[id number]    HPI  Review of Systems   Respiratory: Negative for shortness of breath.    Cardiovascular: Negative for chest pain and palpitations.   Musculoskeletal: Positive for neck pain.   Neurological: Negative for headaches.       Objective:      Physical Exam   Constitutional: He is oriented to person, place, and time. He appears well-developed and well-nourished.   Cardiovascular: Normal rate, regular rhythm and normal heart sounds.    Pulmonary/Chest: Effort normal and breath sounds normal.   Musculoskeletal: He exhibits no edema.   Neurological: He is alert and oriented to person, place, and time.   Skin: Skin is warm and dry.   Psychiatric: He has a normal mood and affect.   Nursing note and vitals reviewed.      Assessment:       1. Anemia, unspecified type    2. Good hypertension control    3. Hyperglycemia    4. Hyperlipidemia, unspecified hyperlipidemia type    5. Positive for microalbuminuria    6. Chronic anxiety    7. Work-related stress    8. Attention deficit disorder, unspecified hyperactivity presence    9. Screening PSA (prostate specific antigen)    10. Elevated PSA        Plan:     1. Anemia, unspecified type  Regular donator of blood so may be  Contributing.  Add iron supplement  - Fecal Immunochemical Test (iFOBT); Future  - Iron and TIBC; Future  - Ferritin;  Future    2. Good hypertension control  Controlled on current medications.  Continue current medications.      3. Hyperglycemia  Controlled with diet    4. Hyperlipidemia, unspecified hyperlipidemia type  Controlled on current medications.  Continue current medications.      5. Positive for microalbuminuria  Cont aggressive htn mgmt    6. Chronic anxiety  Cont psych mgmt    7. Work-related stress  Recommend counseling     8. Attention deficit disorder, unspecified hyperactivity presence  Cont treatment with psych    9. Screening PSA (prostate specific antigen)  Cont urology monitoring    10. Elevated PSA  See above    Reeval 6 months or sooner prn

## 2018-03-23 DIAGNOSIS — D50.9 IRON DEFICIENCY ANEMIA, UNSPECIFIED IRON DEFICIENCY ANEMIA TYPE: Primary | ICD-10-CM

## 2018-03-28 ENCOUNTER — OFFICE VISIT (OUTPATIENT)
Dept: UROLOGY | Facility: CLINIC | Age: 58
End: 2018-03-28
Payer: COMMERCIAL

## 2018-03-28 VITALS
HEART RATE: 69 BPM | DIASTOLIC BLOOD PRESSURE: 73 MMHG | SYSTOLIC BLOOD PRESSURE: 119 MMHG | RESPIRATION RATE: 18 BRPM | WEIGHT: 173.06 LBS | HEIGHT: 67 IN | BODY MASS INDEX: 27.16 KG/M2

## 2018-03-28 DIAGNOSIS — N40.0 BENIGN PROSTATIC HYPERPLASIA, UNSPECIFIED WHETHER LOWER URINARY TRACT SYMPTOMS PRESENT: Primary | ICD-10-CM

## 2018-03-28 LAB
BILIRUB SERPL-MCNC: NORMAL MG/DL
BLOOD URINE, POC: NORMAL
COLOR, POC UA: CLEAR
GLUCOSE UR QL STRIP: NORMAL
KETONES UR QL STRIP: NORMAL
LEUKOCYTE ESTERASE URINE, POC: NORMAL
NITRITE, POC UA: NORMAL
PH, POC UA: 6
PROTEIN, POC: NORMAL
SPECIFIC GRAVITY, POC UA: 1.01
UROBILINOGEN, POC UA: NORMAL

## 2018-03-28 PROCEDURE — 99999 PR PBB SHADOW E&M-EST. PATIENT-LVL III: CPT | Mod: PBBFAC,,, | Performed by: UROLOGY

## 2018-03-28 PROCEDURE — 81002 URINALYSIS NONAUTO W/O SCOPE: CPT | Mod: S$GLB,,, | Performed by: UROLOGY

## 2018-03-28 PROCEDURE — 3074F SYST BP LT 130 MM HG: CPT | Mod: CPTII,S$GLB,, | Performed by: UROLOGY

## 2018-03-28 PROCEDURE — 3078F DIAST BP <80 MM HG: CPT | Mod: CPTII,S$GLB,, | Performed by: UROLOGY

## 2018-03-28 PROCEDURE — 99214 OFFICE O/P EST MOD 30 MIN: CPT | Mod: 25,S$GLB,, | Performed by: UROLOGY

## 2018-03-29 ENCOUNTER — LAB VISIT (OUTPATIENT)
Dept: LAB | Facility: HOSPITAL | Age: 58
End: 2018-03-29
Attending: FAMILY MEDICINE
Payer: COMMERCIAL

## 2018-03-29 DIAGNOSIS — D64.9 ANEMIA, UNSPECIFIED TYPE: ICD-10-CM

## 2018-03-29 LAB — HEMOCCULT STL QL IA: POSITIVE

## 2018-03-29 PROCEDURE — 82274 ASSAY TEST FOR BLOOD FECAL: CPT

## 2018-03-29 NOTE — PROGRESS NOTES
Subjective:       Patient ID: Parth Juarez is a 58 y.o. male.    Chief Complaint:   OFFICE NOTE    CHIEF COMPLAINT:  BPH with slight elevation of the PSA.    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  The patient is known to have BPH with slight   elevation of the PSA.  The patient referred that at the present time he has   nocturia x3.  The flow is improving and the patient denies any dysuria or gross   hematuria.  He feels that he empties the bladder satisfactorily.  His family   physician, Dr. Betty Styles put the patient on finasteride 5 mg daily and he   refers that he is seeing some improvement of his symptoms.  It is to be noted   that the last PSA was on 03/21/2018 and was 4.4 and this was before he started   taking finasteride.    FAMILY HISTORY:  Negative for prostate cancer and is positive for bladder CA.    His father suffered of that condition.    The past medical, the past surgical history, the current medications, and the   allergies are well documented in the Medical record and they were reviewed by me   during this visit.  The patient's medications were also reviewed.  At the   present time, the patient denies any side effects from the finasteride, even has   taken the medication for a short period of time.    The urinalysis today is within normal limits.      EOR/HN  dd: 03/29/2018 10:19:54 (CDT)  td: 03/30/2018 05:35:15 (CDT)  Doc ID   #[id number]  Job ID #[id number]    CC:       HPI  Review of Systems   Constitutional: Negative for activity change and appetite change.   HENT: Negative.    Eyes: Negative for discharge.   Respiratory: Negative for cough and shortness of breath.    Cardiovascular: Negative for chest pain and palpitations.   Gastrointestinal: Negative for abdominal distention, abdominal pain, constipation and vomiting.   Genitourinary: Negative for discharge, dysuria, flank pain, frequency, hematuria, testicular pain  and urgency.   Musculoskeletal: Negative for arthralgias.   Skin: Negative for rash.   Neurological: Negative for dizziness.   Psychiatric/Behavioral: The patient is not nervous/anxious.        Objective:      Physical Exam   Constitutional: He appears well-developed and well-nourished.   HENT:   Head: Normocephalic.   Eyes: Pupils are equal, round, and reactive to light.   Neck: Normal range of motion.   Cardiovascular: Normal rate.    Pulmonary/Chest: Effort normal.   Abdominal: Soft. He exhibits no distension and no mass. There is no tenderness.   Genitourinary: Rectum normal, prostate normal and penis normal. Rectal exam shows no external hemorrhoid, no mass and no tenderness. Prostate is not enlarged and not tender. Right testis shows no mass and no tenderness. Left testis shows no mass and no tenderness. No discharge found.         Musculoskeletal: Normal range of motion.   Neurological: He is alert.   Skin: Skin is warm.     Psychiatric: He has a normal mood and affect.       Assessment:       1. Benign prostatic hyperplasia, unspecified whether lower urinary tract symptoms present        Plan:       Benign prostatic hyperplasia, unspecified whether lower urinary tract symptoms present  -     POCT URINE DIPSTICK WITHOUT MICROSCOPE  -     Prostate Specific Antigen, Diagnostic; Future; Expected date: 06/28/2018    RTC 6 mo.

## 2018-04-02 PROBLEM — Z12.5 SCREENING PSA (PROSTATE SPECIFIC ANTIGEN): Status: ACTIVE | Noted: 2018-04-02

## 2018-04-02 PROBLEM — F98.8 ATTENTION DEFICIT DISORDER: Status: ACTIVE | Noted: 2018-04-02

## 2018-04-02 PROBLEM — D64.9 ANEMIA: Status: ACTIVE | Noted: 2018-04-02

## 2018-04-03 ENCOUNTER — PATIENT OUTREACH (OUTPATIENT)
Dept: OTHER | Facility: OTHER | Age: 58
End: 2018-04-03

## 2018-04-03 NOTE — PROGRESS NOTES
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    Per 30 day average, blood pressure is well controlled 127/80 mmHg. Encouraged adherence to low sodium diet and physical activity guidelines. Advised patient to call or message with questions or concerns.

## 2018-04-04 DIAGNOSIS — R19.5 POSITIVE FIT (FECAL IMMUNOCHEMICAL TEST): ICD-10-CM

## 2018-04-04 DIAGNOSIS — D50.9 IRON DEFICIENCY ANEMIA, UNSPECIFIED IRON DEFICIENCY ANEMIA TYPE: Primary | ICD-10-CM

## 2018-04-05 ENCOUNTER — PATIENT MESSAGE (OUTPATIENT)
Dept: FAMILY MEDICINE | Facility: CLINIC | Age: 58
End: 2018-04-05

## 2018-04-05 ENCOUNTER — PATIENT MESSAGE (OUTPATIENT)
Dept: GASTROENTEROLOGY | Facility: CLINIC | Age: 58
End: 2018-04-05

## 2018-04-05 DIAGNOSIS — R19.4 CHANGE IN BOWEL HABITS: ICD-10-CM

## 2018-04-05 DIAGNOSIS — D50.9 IRON DEFICIENCY ANEMIA, UNSPECIFIED IRON DEFICIENCY ANEMIA TYPE: Primary | ICD-10-CM

## 2018-04-05 DIAGNOSIS — R63.4 WEIGHT LOSS: ICD-10-CM

## 2018-04-05 DIAGNOSIS — R19.5 POSITIVE FIT (FECAL IMMUNOCHEMICAL TEST): ICD-10-CM

## 2018-04-08 ENCOUNTER — PATIENT MESSAGE (OUTPATIENT)
Dept: GASTROENTEROLOGY | Facility: CLINIC | Age: 58
End: 2018-04-08

## 2018-04-09 RX ORDER — DEXTROAMPHETAMINE SACCHARATE, AMPHETAMINE ASPARTATE MONOHYDRATE, DEXTROAMPHETAMINE SULFATE AND AMPHETAMINE SULFATE 3.75; 3.75; 3.75; 3.75 MG/1; MG/1; MG/1; MG/1
15 CAPSULE, EXTENDED RELEASE ORAL EVERY MORNING
Qty: 30 CAPSULE | Refills: 0 | Status: SHIPPED | OUTPATIENT
Start: 2018-04-09 | End: 2018-05-14 | Stop reason: SDUPTHER

## 2018-04-11 ENCOUNTER — HOSPITAL ENCOUNTER (OUTPATIENT)
Facility: HOSPITAL | Age: 58
Discharge: HOME | End: 2018-04-11
Attending: INTERNAL MEDICINE | Admitting: INTERNAL MEDICINE
Payer: COMMERCIAL

## 2018-04-11 ENCOUNTER — ANESTHESIA (OUTPATIENT)
Dept: ENDOSCOPY | Facility: HOSPITAL | Age: 58
End: 2018-04-11
Payer: COMMERCIAL

## 2018-04-11 ENCOUNTER — SURGERY (OUTPATIENT)
Age: 58
End: 2018-04-11

## 2018-04-11 ENCOUNTER — ANESTHESIA EVENT (OUTPATIENT)
Dept: ENDOSCOPY | Facility: HOSPITAL | Age: 58
End: 2018-04-11
Payer: COMMERCIAL

## 2018-04-11 VITALS
DIASTOLIC BLOOD PRESSURE: 66 MMHG | RESPIRATION RATE: 16 BRPM | BODY MASS INDEX: 25.9 KG/M2 | WEIGHT: 165 LBS | OXYGEN SATURATION: 94 % | SYSTOLIC BLOOD PRESSURE: 106 MMHG | HEIGHT: 67 IN | TEMPERATURE: 98 F | HEART RATE: 52 BPM

## 2018-04-11 DIAGNOSIS — K64.8 INTERNAL HEMORRHOIDS: ICD-10-CM

## 2018-04-11 DIAGNOSIS — K29.70 GASTRITIS, PRESENCE OF BLEEDING UNSPECIFIED, UNSPECIFIED CHRONICITY, UNSPECIFIED GASTRITIS TYPE: ICD-10-CM

## 2018-04-11 DIAGNOSIS — D50.9 IRON DEFICIENCY ANEMIA, UNSPECIFIED IRON DEFICIENCY ANEMIA TYPE: Primary | ICD-10-CM

## 2018-04-11 DIAGNOSIS — K44.9 HIATAL HERNIA: ICD-10-CM

## 2018-04-11 DIAGNOSIS — D64.9 ANEMIA: ICD-10-CM

## 2018-04-11 PROCEDURE — 88305 TISSUE EXAM BY PATHOLOGIST: CPT

## 2018-04-11 PROCEDURE — 27201012 HC FORCEPS, HOT/COLD, DISP: Performed by: INTERNAL MEDICINE

## 2018-04-11 PROCEDURE — 88342 IMHCHEM/IMCYTCHM 1ST ANTB: CPT | Mod: 26,,,

## 2018-04-11 PROCEDURE — 88342 IMHCHEM/IMCYTCHM 1ST ANTB: CPT

## 2018-04-11 PROCEDURE — 45378 DIAGNOSTIC COLONOSCOPY: CPT | Mod: ,,, | Performed by: INTERNAL MEDICINE

## 2018-04-11 PROCEDURE — 25000003 PHARM REV CODE 250: Performed by: INTERNAL MEDICINE

## 2018-04-11 PROCEDURE — D9220A PRA ANESTHESIA: Mod: ANES,,, | Performed by: ANESTHESIOLOGY

## 2018-04-11 PROCEDURE — 43251 EGD REMOVE LESION SNARE: CPT | Mod: 51,,, | Performed by: INTERNAL MEDICINE

## 2018-04-11 PROCEDURE — 63600175 PHARM REV CODE 636 W HCPCS: Performed by: NURSE ANESTHETIST, CERTIFIED REGISTERED

## 2018-04-11 PROCEDURE — 43239 EGD BIOPSY SINGLE/MULTIPLE: CPT | Mod: 59,,, | Performed by: INTERNAL MEDICINE

## 2018-04-11 PROCEDURE — 37000009 HC ANESTHESIA EA ADD 15 MINS: Performed by: INTERNAL MEDICINE

## 2018-04-11 PROCEDURE — 43251 EGD REMOVE LESION SNARE: CPT | Performed by: INTERNAL MEDICINE

## 2018-04-11 PROCEDURE — 27201089 HC SNARE, DISP (ANY): Performed by: INTERNAL MEDICINE

## 2018-04-11 PROCEDURE — 88305 TISSUE EXAM BY PATHOLOGIST: CPT | Mod: 26,,,

## 2018-04-11 PROCEDURE — 43239 EGD BIOPSY SINGLE/MULTIPLE: CPT | Performed by: INTERNAL MEDICINE

## 2018-04-11 PROCEDURE — 45378 DIAGNOSTIC COLONOSCOPY: CPT | Performed by: INTERNAL MEDICINE

## 2018-04-11 PROCEDURE — 37000008 HC ANESTHESIA 1ST 15 MINUTES: Performed by: INTERNAL MEDICINE

## 2018-04-11 PROCEDURE — D9220A PRA ANESTHESIA: Mod: CRNA,,, | Performed by: NURSE ANESTHETIST, CERTIFIED REGISTERED

## 2018-04-11 RX ORDER — PROPOFOL 10 MG/ML
INJECTION, EMULSION INTRAVENOUS
Status: COMPLETED
Start: 2018-04-11 | End: 2018-04-11

## 2018-04-11 RX ORDER — LIDOCAINE HCL/PF 100 MG/5ML
SYRINGE (ML) INTRAVENOUS
Status: DISCONTINUED | OUTPATIENT
Start: 2018-04-11 | End: 2018-04-11

## 2018-04-11 RX ORDER — LIDOCAINE HYDROCHLORIDE 20 MG/ML
INJECTION, SOLUTION EPIDURAL; INFILTRATION; INTRACAUDAL; PERINEURAL
Status: DISCONTINUED
Start: 2018-04-11 | End: 2018-04-11 | Stop reason: HOSPADM

## 2018-04-11 RX ORDER — PROPOFOL 10 MG/ML
VIAL (ML) INTRAVENOUS
Status: DISCONTINUED | OUTPATIENT
Start: 2018-04-11 | End: 2018-04-11

## 2018-04-11 RX ORDER — SODIUM CHLORIDE 9 MG/ML
INJECTION, SOLUTION INTRAVENOUS CONTINUOUS
Status: DISCONTINUED | OUTPATIENT
Start: 2018-04-11 | End: 2018-04-11 | Stop reason: HOSPADM

## 2018-04-11 RX ADMIN — PROPOFOL 50 MG: 10 INJECTION, EMULSION INTRAVENOUS at 07:04

## 2018-04-11 RX ADMIN — SODIUM CHLORIDE: 0.9 INJECTION, SOLUTION INTRAVENOUS at 06:04

## 2018-04-11 RX ADMIN — LIDOCAINE HYDROCHLORIDE 100 MG: 20 INJECTION, SOLUTION INTRAVENOUS at 07:04

## 2018-04-11 RX ADMIN — PROPOFOL 130 MG: 10 INJECTION, EMULSION INTRAVENOUS at 07:04

## 2018-04-11 NOTE — ANESTHESIA POSTPROCEDURE EVALUATION
"Anesthesia Post Evaluation    Patient: Parth Juarez    Procedure(s) Performed: Procedure(s) (LRB):  ESOPHAGOGASTRODUODENOSCOPY (EGD) (N/A)  COLONOSCOPY (N/A)    Final Anesthesia Type: general  Patient location during evaluation: PACU  Patient participation: Yes- Able to Participate  Level of consciousness: awake and alert  Post-procedure vital signs: reviewed and stable  Pain management: adequate  Airway patency: patent  PONV status at discharge: No PONV  Anesthetic complications: no      Cardiovascular status: blood pressure returned to baseline  Respiratory status: unassisted  Hydration status: euvolemic  Follow-up not needed.        Visit Vitals  /66   Pulse (!) 52   Temp 36.4 °C (97.6 °F)   Resp 16   Ht 5' 7" (1.702 m)   Wt 74.8 kg (165 lb)   SpO2 (!) 94%   BMI 25.84 kg/m²       Pain/Marilin Score: Pain Assessment Performed: Yes (4/11/2018  8:06 AM)  Presence of Pain: denies (4/11/2018  8:06 AM)  Pain Rating Prior to Med Admin: 0 (4/11/2018  8:06 AM)  Pain Rating Post Med Admin: 0 (4/11/2018  8:06 AM)  Marilin Score: 10 (4/11/2018  8:05 AM)      "

## 2018-04-11 NOTE — TRANSFER OF CARE
"Anesthesia Transfer of Care Note    Patient: Parth Juarez    Procedure(s) Performed: Procedure(s) (LRB):  ESOPHAGOGASTRODUODENOSCOPY (EGD) (N/A)  COLONOSCOPY (N/A)    Patient location: GI    Anesthesia Type: general    Transport from OR: Transported from OR on room air with adequate spontaneous ventilation    Post pain: adequate analgesia    Post assessment: no apparent anesthetic complications    Post vital signs: stable    Level of consciousness: awake, alert and oriented    Nausea/Vomiting: no nausea/vomiting    Complications: none    Transfer of care protocol was followed      Last vitals:   Visit Vitals  BP (!) 91/54 (BP Location: Left arm, Patient Position: Lying)   Pulse 74   Temp 36.3 °C (97.3 °F) (Temporal)   Resp 14   Ht 5' 7" (1.702 m)   Wt 74.8 kg (165 lb)   SpO2 100%   BMI 25.84 kg/m²     "

## 2018-04-11 NOTE — H&P
CC: NICCI    58 year old male with above. States that symptoms are absent, no alleviating/exacerbating factors. No family history of CA. No personal history of polyps. No bleeding.  + 14 lb weight loss which has been secondary to changes in diet.     ROS:  No headache, no fever/chills, no chest pain/SOB, no nausea/vomiting/diarrhea/constipation/GI bleeding/abdominal pain, no dysuria/hematuria.    VSSAF   Exam:   Alert and oriented x 3; no apparent distress   PERRLA, sclera anicteric  CV: Regular rate/rhythm, normal PMI   Lungs: Clear bilaterally with no wheeze/rales   Abdomen: Soft, NT/ND, normal bowel sounds   Ext: No cyanosis, clubbing     Impression:   As above    Plan:   Proceed with endoscopy. Further recs to follow.

## 2018-04-11 NOTE — ANESTHESIA PREPROCEDURE EVALUATION
04/11/2018  Parth Juarez is a 58 y.o., male.    Pre-op Assessment    I have reviewed the Patient Summary Reports.     I have reviewed the Nursing Notes.   I have reviewed the Medications.     Review of Systems  Anesthesia Hx:  No problems with previous Anesthesia  Denies Family Hx of Anesthesia complications.    Social:  Non-Smoker    Cardiovascular:   Hypertension, well controlled    Pulmonary:   Asthma    Renal/:  Renal/ Normal     Musculoskeletal:  Musculoskeletal Normal    Neurological:  Neurology Normal    Endocrine:  Endocrine Normal        Physical Exam  General:  Well nourished    Airway/Jaw/Neck:  Airway Findings: Mouth Opening: Normal Tongue: Normal  General Airway Assessment: Adult  Mallampati: I  TM Distance: Normal, at least 6 cm       Chest/Lungs:  Chest/Lungs Findings: Clear to auscultation, Normal Respiratory Rate     Heart/Vascular:  Heart Findings: Rate: Normal  Rhythm: Regular Rhythm             Anesthesia Plan  Type of Anesthesia, risks & benefits discussed:  Anesthesia Type:  general  Patient's Preference:   Intra-op Monitoring Plan: standard ASA monitors  Intra-op Monitoring Plan Comments:   Post Op Pain Control Plan:   Post Op Pain Control Plan Comments:   Induction:   IV  Beta Blocker:  Patient is not currently on a Beta-Blocker (No further documentation required).       Informed Consent: Patient understands risks and agrees with Anesthesia plan.  Questions answered. Anesthesia consent signed with patient.  ASA Score: 2     Day of Surgery Review of History & Physical:    H&P update referred to the provider.         Ready For Surgery From Anesthesia Perspective.

## 2018-04-11 NOTE — PROVATION PATIENT INSTRUCTIONS
Discharge Summary/Instructions after an Endoscopic Procedure  Patient Name: Parth Juarez  Patient MRN: [id number]  Patient YOB: 1960  Wednesday, April 11, 2018  Vikram Farrell MD  RESTRICTIONS:  During your procedure today, you received medications for sedation.  These   medications may affect your judgment, balance and coordination.  Therefore,   for 24 hours, you have the following restrictions:   - DO NOT drive a car, operate machinery, make legal/financial decisions,   sign important papers or drink alcohol.    ACTIVITY:  The following day: return to full activity including work, except no heavy   lifting, straining or running for 3 days if polyps were removed.  DIET:  Eat and drink normally unless instructed otherwise.     TREATMENT FOR COMMON SIDE EFFECTS:  - Mild abdominal pain, nausea, belching, bloating or excessive gas:  rest,   eat lightly and use a heating pad.  - Sore Throat: treat with throat lozenges and/or gargle with warm salt   water.  - Because air was used during the procedure, expelling large amounts of air   from your rectum or belching is normal.  - If a bowel prep was taken, you may not have a bowel movement for 1-3 days.    This is normal.  SYMPTOMS TO WATCH FOR AND REPORT TO YOUR PHYSICIAN:  1. Abdominal pain or bloating, other than gas cramps.  2. Chest pain.  3. Back pain.  4. Signs of infection such as: chills or fever occurring within 24 hours   after the procedure.  5. Rectal bleeding, which would show as bright red, maroon, or black stools.   (A tablespoon of blood from the rectum is not serious, especially if   hemorrhoids are present.)  6. Vomiting.  7. Weakness or dizziness.  GO DIRECTLY TO THE NEAREST EMERGENCY ROOM IF YOU HAVE ANY OF THE FOLLOWING:      Difficulty breathing              Chills and/or fever over 101 F   Persistent vomiting and/or vomiting blood   Severe abdominal pain   Severe chest pain   Black, tarry stools   Bleeding- more than one tablespoon   Any  other symptom or condition that you feel may need urgent attention  Your doctor recommends these additional instructions:  If any biopsies were taken, your doctors clinic will contact you in 1 to 2   weeks with any results.  - Patient has a contact number available for emergencies.  The signs and   symptoms of potential delayed complications were discussed with the   patient.  Return to normal activities tomorrow.  Written discharge   instructions were provided to the patient.   - High fiber diet.   - Continue present medications.   - Repeat colonoscopy in 10 years for screening purposes.   - Discharge patient to home (ambulatory).   - To visualize the small bowel, perform video capsule endoscopy at   appointment to be scheduled.   - Return to my office PRN.  For questions, problems or results please call your physician - Vikram Farrell MD at Work:  [phone].  [facility], EMERGENCY ROOM PHONE NUMBER: [phone]  IF A COMPLICATION OR EMERGENCY SITUATION ARISES AND YOU ARE UNABLE TO REACH   YOUR PHYSICIAN - GO DIRECTLY TO THE EMERGENCY ROOM.  Vikram Farrell MD  4/11/2018 7:55:32 AM  This report has been verified and signed electronically.

## 2018-04-11 NOTE — PROVATION PATIENT INSTRUCTIONS
Discharge Summary/Instructions after an Endoscopic Procedure  Patient Name: Parth Juarez  Patient MRN: [id number]  Patient YOB: 1960  Wednesday, April 11, 2018  Vikram Farrell MD  RESTRICTIONS:  During your procedure today, you received medications for sedation.  These   medications may affect your judgment, balance and coordination.  Therefore,   for 24 hours, you have the following restrictions:   - DO NOT drive a car, operate machinery, make legal/financial decisions,   sign important papers or drink alcohol.    ACTIVITY:  The following day: return to full activity including work, except no heavy   lifting, straining or running for 3 days if polyps were removed.  DIET:  Eat and drink normally unless instructed otherwise.     TREATMENT FOR COMMON SIDE EFFECTS:  - Mild abdominal pain, nausea, belching, bloating or excessive gas:  rest,   eat lightly and use a heating pad.  - Sore Throat: treat with throat lozenges and/or gargle with warm salt   water.  - Because air was used during the procedure, expelling large amounts of air   from your rectum or belching is normal.  - If a bowel prep was taken, you may not have a bowel movement for 1-3 days.    This is normal.  SYMPTOMS TO WATCH FOR AND REPORT TO YOUR PHYSICIAN:  1. Abdominal pain or bloating, other than gas cramps.  2. Chest pain.  3. Back pain.  4. Signs of infection such as: chills or fever occurring within 24 hours   after the procedure.  5. Rectal bleeding, which would show as bright red, maroon, or black stools.   (A tablespoon of blood from the rectum is not serious, especially if   hemorrhoids are present.)  6. Vomiting.  7. Weakness or dizziness.  GO DIRECTLY TO THE NEAREST EMERGENCY ROOM IF YOU HAVE ANY OF THE FOLLOWING:      Difficulty breathing              Chills and/or fever over 101 F   Persistent vomiting and/or vomiting blood   Severe abdominal pain   Severe chest pain   Black, tarry stools   Bleeding- more than one tablespoon   Any  other symptom or condition that you feel may need urgent attention  Your doctor recommends these additional instructions:  If any biopsies were taken, your doctors clinic will contact you in 1 to 2   weeks with any results.  - Patient has a contact number available for emergencies.  The signs and   symptoms of potential delayed complications were discussed with the   patient.  Return to normal activities tomorrow.  Written discharge   instructions were provided to the patient.   - Resume previous diet.   - Continue present medications.   - No aspirin, ibuprofen, naproxen, or other non-steroidal anti-inflammatory   drugs.   - Await pathology results.   - Discharge patient to home (ambulatory).   - Follow an antireflux regimen.   - Perform a colonoscopy today.   - Return to my office PRN.  For questions, problems or results please call your physician - Vikram Farrell MD at Work:  [phone].  [facility], EMERGENCY ROOM PHONE NUMBER: [phone]  IF A COMPLICATION OR EMERGENCY SITUATION ARISES AND YOU ARE UNABLE TO REACH   YOUR PHYSICIAN - GO DIRECTLY TO THE EMERGENCY ROOM.  Vikram Farrell MD  4/11/2018 7:31:30 AM  This report has been verified and signed electronically.

## 2018-04-11 NOTE — DISCHARGE INSTRUCTIONS
Upper GI Endoscopy     During endoscopy, a long, flexible tube is used to view the inside of your upper GI tract.      Upper GI endoscopy allows your healthcare provider to look directly into the beginning of your gastrointestinal (GI) tract. The esophagus, stomach, and duodenum (the first part of the small intestine) make up the upper GI tract.   Before the exam  Follow these and any other instructions you are given before your endoscopy. If you dont follow the healthcare providers instructions carefully, the test may need to be canceled or done over:  · Don't eat or drink anything after midnight the night before your exam. If your exam is in the afternoon, drink only clear liquids in the morning. Don't eat or drink anything for 8 hours before the exam. In some cases, you may be able to take medicines with sips of water until 2 hours before the procedure. Speak with your healthcare provider about this.   · Bring your X-rays and any other test results you have.  · Because you will be sedated, arrange for an adult to drive you home after the exam.  · Tell your healthcare provider before the exam if you are taking any medicines or have any medical problems.  The procedure  Here is what to expect:  · You will lie on the endoscopy table. Usually patients lie on the left side.  · You will be monitored and given oxygen.  · Your throat may be numbed with a spray or gargle. You are given medicine through an intravenous (IV) line that will help you relax and remain comfortable. You may be awake or asleep during the procedure.  · The healthcare provider will put the endoscope in your mouth and down your esophagus. It is thinner than most pieces of food that you swallow. It will not affect your breathing. The medicine helps keep you from gagging.  · Air is put into your GI tract to expand it. It can make you burp.  · During the procedure, the healthcare provider can take biopsies (tissue samples), remove abnormalities,  such as polyps, or treat abnormalities through a variety of devices placed through the endoscope. You will not feel this.   · The endoscope carries images of your upper GI tract to a video screen. If you are awake, you may be able to look at the images.  · After the procedure is done, you will rest for a time. An adult must drive you home.  When to call your healthcare provider  Contact your healthcare provider if you have:  · Black or tarry stools, or blood in your stool  · Fever  · Pain in your belly that does not go away  · Nausea and vomiting, or vomiting blood   Date Last Reviewed: 7/1/2016 © 2000-2017 [employer]. [address] [place] [address], [place] [address]. All rights reserved. This information is not intended as a substitute for professional medical care. Always follow your healthcare professional's instructions.        What Is a Hiatal Hernia?    Hiatal hernia is when the area where the stomach and esophagus meet bulges up through the diaphragm into the chest cavity. In some cases, part of the stomach may bulge above the diaphragm. Stomach acid may move up into the esophagus and cause symptoms. The symptoms are often blamed on gastroesophageal reflux disease (GERD). You may only know about the hernia when it shows up on an X-ray taken for other reasons.   What you may feel  The hiatus is a normal hole in the diaphragm. The esophagus passes through this hole and leads to the stomach. In some cases, part of the stomach may bulge above the diaphragm. This bulge is called a hernia. Stomach acid may move up into the esophagus and cause symptoms.  When you eat, the muscle at the hiatus relaxes to allow food to pass into the stomach. It tightens again to keep food and digestive acids in the stomach.  Many people with hiatal hernias have mild symptoms. You may notice the following GERD symptoms:  · Heartburn or other chest discomfort  · A feeling of chest fullness after a meal  · Frequent  burping  · Acid taste in the mouth  · Trouble swallowing  Treating symptoms  If you have been diagnosed with hiatal hernia, these suggestions may help improve symptoms:  · Lose excess weight. Extra weight puts pressure on the stomach and esophagus.  · Dont lie down after eating. Sit up for at least an hour after eating. Lying down after eating can increase symptoms.  · Avoid certain foods and drinks. These include fatty foods, chocolate, coffee, mint, and other foods that cause symptoms for you.  · Dont smoke or drink alcohol. These can worsen symptoms.  · Look at your medicines. Discuss your medicines with your healthcare provider. Many medicines can cause symptoms.  · Consider an antacid medicine. Ask your healthcare provider about over-the-counter and prescription medicines that may help.  · Ask about surgery, if needed. Surgery is a treatment choice for some people. Your healthcare provider can determine if surgery is an option for you.    Date Last Reviewed: 10/1/2016  © 4656-9339 [employer]. [address] [place] [address], [place] [address]. All rights reserved. This information is not intended as a substitute for professional medical care. Always follow your healthcare professional's instructions.        Discharge Instructions: Eating a High-Fiber Diet  Your health care provider has prescribed a high-fiber diet for you. Fiber is what gives strength and structure to plants. Most grains, beans, vegetables, and fruits contain fiber. Foods rich in fiber are often low in calories and fat, but they fill you up more. These foods may also reduce the risk of certain health problems.  There are two types of fiber:  · Insoluble fiber. This is found in whole grains, cereals, and certain fruits and vegetables (such as apple skins, corn, and beans). Insoluble fiber is made up mainly of plant cell walls. It may prevent constipation and reduce the risk of certain types of cancer.  · Soluble fiber. This type of fiber  is found in oats, beans, nuts, and certain fruits and vegetables (such as strawberries and peas). Soluble fiber turns to gel in the digestive system, slowing the movement of the digestive tract. It helps control blood sugar levels and can reduce cholesterol, which may help lower the risk of heart disease. Soluble fiber can also help control appetite.     Home care  · Know how much fiber you need a day. The recommended daily amount of fiber is 25 grams for women and 38 grams for men. After age 50, daily fiber needs drop to 21 grams for women and 30 grams for men.  · Ask your doctor about a fiber supplement. (Always take fiber supplements with a large glass of water.)  · Keep track of how much fiber you eat.  · Eat a variety of foods high in fiber.  · Learn to read and understand food labels.  · Ask your healthcare provider how much water you should be drinking.  · Look for these high-fiber foods:  ¨ Whole-grain breads and cereals  § 6 ounces a day give you about 18 grams of fiber (1 ounce is equal to 1 slice of bread, 1 cup of dry cereal, or 1/2 cup of cooked rice).  § Include wheat and oat bran cereals, whole-wheat muffins or toast, and corn tortillas in your meals.  ¨ Fruits   § 2 cups a day give you about 8 grams of fiber.  § Apples, oranges, strawberries, pears, and bananas are good sources.  § Fruit juice does not contain as much fiber as the fruit it was made from.  ¨ Vegetables  § 2½ cups a day give you about 11 grams of fiber. Add asparagus, carrots, broccoli, peas, and corn to your meals.  ¨ Legumes  § 1/4 cup a day (in place of meat) gives you about 4 grams of fiber. Try navy beans, lentils, chickpeas, and soybeans.  ¨ Seeds   § A small handful of seeds gives you about 3 grams of fiber. Try sunflower seeds.  Follow-up  Make a follow-up appointment with a nutritionist as directed by our staff.  Date Last Reviewed: 6/1/2015  © 5113-1549 [employer]. [address] [place] [address], [place] [address]. All  rights reserved. This information is not intended as a substitute for professional medical care. Always follow your healthcare professional's instructions.        Colonoscopy     A camera attached to a flexible tube with a viewing lens is used to take video pictures.     Colonoscopy is a test to view the inside of your lower digestive tract (colon and rectum). Sometimes it can show the last part of the small intestine (ileum). During the test, small pieces of tissue may be removed for testing. This is called a biopsy. Small growths, such as polyps, may also be removed.   Why is colonoscopy done?  The test is done to help look for colon cancer. And it can help find the source of abdominal pain, bleeding, and changes in bowel habits. It may be needed once a year, depending on factors such as your:  · Age  · Health history  · Family health history  · Symptoms  · Results from any prior colonoscopy  Risks and possible complications  These include:  · Bleeding               · A puncture or tear in the colon   · Risks of anesthesia  · A cancer lesion not being seen  Getting ready   To prepare for the test:  · Talk with your healthcare provider about the risks of the test (see below). Also ask your healthcare provider about alternatives to the test.  · Tell your healthcare provider about any medicines you take. Also tell him or her about any health conditions you may have.  · Make sure your rectum and colon are empty for the test. Follow the diet and bowel prep instructions exactly. If you dont, the test may need to be rescheduled.  · Plan for a friend or family member to drive you home after the test.     Colonoscopy provides an inside view of the entire colon.     You may discuss the results with your doctor right away or at a future visit.  During the test   The test is usually done in the hospital on an outpatient basis. This means you go home the same day. The procedure takes about 30 minutes. During that time:  · You  are given relaxing (sedating) medicine through an IV line. You may be drowsy, or fully asleep.  · The healthcare provider will first give you a physical exam to check for anal and rectal problems.  · Then the anus is lubricated and the scope inserted.  · If you are awake, you may have a feeling similar to needing to have a bowel movement. You may also feel pressure as air is pumped into the colon. Its OK to pass gas during the procedure.  · Biopsy, polyp removal, or other treatments may be done during the test.  After the test   You may have gas right after the test. It can help to try to pass it to help prevent later bloating. Your healthcare provider may discuss the results with you right away. Or you may need to schedule a follow-up visit to talk about the results. After the test, you can go back to your normal eating and other activities. You may be tired from the sedation and need to rest for a few hours.  Date Last Reviewed: 11/1/2016 © 2000-2017 [employer]. [address] [place] [address]. All rights reserved. This information is not intended as a substitute for professional medical care. Always follow your healthcare professional's instructions.      Discharge Instructions: After Your Surgery/Procedure  Youve just had surgery. During surgery you were given medicine called anesthesia to keep you relaxed and free of pain. After surgery you may have some pain or nausea. This is common. Here are some tips for feeling better and getting well after surgery.     Stay on schedule with your medication.   Going home  Your doctor or nurse will show you how to take care of yourself when you go home. He or she will also answer your questions. Have an adult family member or friend drive you home.      For your safety we recommend these precaution for the first 24 hours after your procedure:  · Do not drive or use heavy equipment.  · Do not make important decisions or sign legal papers.  · Do  "not drink alcohol.  · Have someone stay with you, if needed. He or she can watch for problems and help keep you safe.  · Your concentration, balance, coordination, and judgement may be impaired for many hours after anesthesia.  Use caution when ambulating or standing up.     · You may feel weak and "washed out" after anesthesia and surgery.      Subtle residual effects of general anesthesia or sedation with regional / local anesthesia can last more than 24 hours.  Rest for the remainder of the day or longer if your Doctor/Surgeon has advised you to do so.  Although you may feel normal within the first 24 hours, your reflexes and mental ability may be impaired without you realizing it.  You may feel dizzy, lightheaded or sleepy for 24 hours or longer.      Be sure to go to all follow-up visits with your doctor. And rest after your surgery for as long as your doctor tells you to.  Coping with pain  If you have pain after surgery, pain medicine will help you feel better. Take it as told, before pain becomes severe. Also, ask your doctor or pharmacist about other ways to control pain. This might be with heat, ice, or relaxation. And follow any other instructions your surgeon or nurse gives you.  Tips for taking pain medicine  To get the best relief possible, remember these points:  · Pain medicines can upset your stomach. Taking them with a little food may help.  · Most pain relievers taken by mouth need at least 20 to 30 minutes to start to work.  · Taking medicine on a schedule can help you remember to take it. Try to time your medicine so that you can take it before starting an activity. This might be before you get dressed, go for a walk, or sit down for dinner.  · Constipation is a common side effect of pain medicines. Call your doctor before taking any medicines such as laxatives or stool softeners to help ease constipation. Also ask if you should skip any foods. Drinking lots of fluids and eating foods such as " fruits and vegetables that are high in fiber can also help. Remember, do not take laxatives unless your surgeon has prescribed them.  · Drinking alcohol and taking pain medicine can cause dizziness and slow your breathing. It can even be deadly. Do not drink alcohol while taking pain medicine.  · Pain medicine can make you react more slowly to things. Do not drive or run machinery while taking pain medicine.  Your health care provider may tell you to take acetaminophen to help ease your pain. Ask him or her how much you are supposed to take each day. Acetaminophen or other pain relievers may interact with your prescription medicines or other over-the-counter (OTC) drugs. Some prescription medicines have acetaminophen and other ingredients. Using both prescription and OTC acetaminophen for pain can cause you to overdose. Read the labels on your OTC medicines with care. This will help you to clearly know the list of ingredients, how much to take, and any warnings. It may also help you not take too much acetaminophen. If you have questions or do not understand the information, ask your pharmacist or health care provider to explain it to you before you take the OTC medicine.  Managing nausea  Some people have an upset stomach after surgery. This is often because of anesthesia, pain, or pain medicine, or the stress of surgery. These tips will help you handle nausea and eat healthy foods as you get better. If you were on a special food plan before surgery, ask your doctor if you should follow it while you get better. These tips may help:  · Do not push yourself to eat. Your body will tell you when to eat and how much.  · Start off with clear liquids and soup. They are easier to digest.  · Next try semi-solid foods, such as mashed potatoes, applesauce, and gelatin, as you feel ready.  · Slowly move to solid foods. Dont eat fatty, rich, or spicy foods at first.  · Do not force yourself to have 3 large meals a day. Instead  eat smaller amounts more often.  · Take pain medicines with a small amount of solid food, such as crackers or toast, to avoid nausea.     Call your surgeon if  · You still have pain an hour after taking medicine. The medicine may not be strong enough.  · You feel too sleepy, dizzy, or groggy. The medicine may be too strong.  · You have side effects like nausea, vomiting, or skin changes, such as rash, itching, or hives.       If you have obstructive sleep apnea  You were given anesthesia medicine during surgery to keep you comfortable and free of pain. After surgery, you may have more apnea spells because of this medicine and other medicines you were given. The spells may last longer than usual.   At home:  · Keep using the continuous positive airway pressure (CPAP) device when you sleep. Unless your health care provider tells you not to, use it when you sleep, day or night. CPAP is a common device used to treat obstructive sleep apnea.  · Talk with your provider before taking any pain medicine, muscle relaxants, or sedatives. Your provider will tell you about the possible dangers of taking these medicines.  © 7671-8430 [employer]. [address] [place] [address], [place] [address]. All rights reserved. This information is not intended as a substitute for professional medical care. Always follow your healthcare professional's instructions.

## 2018-04-12 ENCOUNTER — PATIENT MESSAGE (OUTPATIENT)
Dept: PSYCHIATRY | Facility: CLINIC | Age: 58
End: 2018-04-12

## 2018-04-17 ENCOUNTER — PATIENT MESSAGE (OUTPATIENT)
Dept: ENDOSCOPY | Facility: HOSPITAL | Age: 58
End: 2018-04-17

## 2018-04-18 ENCOUNTER — HOSPITAL ENCOUNTER (OUTPATIENT)
Facility: HOSPITAL | Age: 58
Discharge: HOME | End: 2018-04-18
Attending: INTERNAL MEDICINE | Admitting: INTERNAL MEDICINE
Payer: COMMERCIAL

## 2018-04-18 VITALS
WEIGHT: 165 LBS | HEIGHT: 67 IN | OXYGEN SATURATION: 100 % | SYSTOLIC BLOOD PRESSURE: 135 MMHG | DIASTOLIC BLOOD PRESSURE: 78 MMHG | BODY MASS INDEX: 25.9 KG/M2 | RESPIRATION RATE: 16 BRPM | HEART RATE: 64 BPM

## 2018-04-18 DIAGNOSIS — D50.9 IDA (IRON DEFICIENCY ANEMIA): ICD-10-CM

## 2018-04-18 PROCEDURE — 25000003 PHARM REV CODE 250: Performed by: INTERNAL MEDICINE

## 2018-04-18 PROCEDURE — 91110 GI TRC IMG INTRAL ESOPH-ILE: CPT | Performed by: INTERNAL MEDICINE

## 2018-04-18 RX ORDER — DEXTROMETHORPHAN/PSEUDOEPHED 2.5-7.5/.8
20 DROPS ORAL 4 TIMES DAILY PRN
Status: DISCONTINUED | OUTPATIENT
Start: 2018-04-18 | End: 2018-04-18 | Stop reason: HOSPADM

## 2018-04-18 RX ORDER — DEXTROMETHORPHAN/PSEUDOEPHED 2.5-7.5/.8
DROPS ORAL
Status: DISCONTINUED
Start: 2018-04-18 | End: 2018-04-18 | Stop reason: HOSPADM

## 2018-04-18 RX ADMIN — SIMETHICONE 20 MG: 20 SUSPENSION/ DROPS ORAL at 07:04

## 2018-04-18 NOTE — OR NURSING
Assessments noted, VSS, Consents signed and time out done. Simethicone given per protocol. Capsule swallowed with out difficulty at 7:48 am with a few sips of water. 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 Released to home with wife.

## 2018-04-18 NOTE — H&P
CC: NICCI    58 year old male with above. States that symptoms are absent, no alleviating/exacerbating factors. No family history of CA. No personal history of polyps. No bleeding or weight loss.     ROS:  No headache, no fever/chills, no chest pain/SOB, no nausea/vomiting/diarrhea/constipation/GI bleeding/abdominal pain, no dysuria/hematuria.    VSSAF   Exam:   Alert and oriented x 3; no apparent distress   PERRLA, sclera anicteric  CV: Regular rate/rhythm, normal PMI   Lungs: Clear bilaterally with no wheeze/rales   Abdomen: Soft, NT/ND, normal bowel sounds   Ext: No cyanosis, clubbing     Impression:   As above    Plan:   Proceed with endoscopy. Further recs to follow.

## 2018-04-19 ENCOUNTER — PATIENT MESSAGE (OUTPATIENT)
Dept: GASTROENTEROLOGY | Facility: CLINIC | Age: 58
End: 2018-04-19

## 2018-04-19 PROCEDURE — 91110 GI TRC IMG INTRAL ESOPH-ILE: CPT | Mod: 26,,, | Performed by: INTERNAL MEDICINE

## 2018-04-19 NOTE — PROVATION PATIENT INSTRUCTIONS
Discharge Summary/Instructions after an Endoscopic Procedure  Patient Name: Parth Juarez  Patient MRN: [id number]  Patient YOB: 1960  Wednesday, April 18, 2018  Vikram Farrell MD  RESTRICTIONS:  During your procedure today, you received medications for sedation.  These   medications may affect your judgment, balance and coordination.  Therefore,   for 24 hours, you have the following restrictions:   - DO NOT drive a car, operate machinery, make legal/financial decisions,   sign important papers or drink alcohol.    ACTIVITY:  The following day: return to full activity including work, except no heavy   lifting, straining or running for 3 days if polyps were removed.  DIET:  Eat and drink normally unless instructed otherwise.     TREATMENT FOR COMMON SIDE EFFECTS:  - Mild abdominal pain, nausea, belching, bloating or excessive gas:  rest,   eat lightly and use a heating pad.  - Sore Throat: treat with throat lozenges and/or gargle with warm salt   water.  - Because air was used during the procedure, expelling large amounts of air   from your rectum or belching is normal.  - If a bowel prep was taken, you may not have a bowel movement for 1-3 days.    This is normal.  SYMPTOMS TO WATCH FOR AND REPORT TO YOUR PHYSICIAN:  1. Abdominal pain or bloating, other than gas cramps.  2. Chest pain.  3. Back pain.  4. Signs of infection such as: chills or fever occurring within 24 hours   after the procedure.  5. Rectal bleeding, which would show as bright red, maroon, or black stools.   (A tablespoon of blood from the rectum is not serious, especially if   hemorrhoids are present.)  6. Vomiting.  7. Weakness or dizziness.  GO DIRECTLY TO THE NEAREST EMERGENCY ROOM IF YOU HAVE ANY OF THE FOLLOWING:      Difficulty breathing              Chills and/or fever over 101 F   Persistent vomiting and/or vomiting blood   Severe abdominal pain   Severe chest pain   Black, tarry stools   Bleeding- more than one tablespoon   Any  other symptom or condition that you feel may need urgent attention  Your doctor recommends these additional instructions:  If any biopsies were taken, your doctors clinic will contact you in 1 to 2   weeks with any results.  1.  No further GI endoscopy at this time unless overt bleeding occurs  2.  Iron supplementation PRN  3.  Follow up PRN  For questions, problems or results please call your physician - Vikram Farrell MD at Work:  [phone].  [facility], EMERGENCY ROOM PHONE NUMBER: [phone]  IF A COMPLICATION OR EMERGENCY SITUATION ARISES AND YOU ARE UNABLE TO REACH   YOUR PHYSICIAN - GO DIRECTLY TO THE EMERGENCY ROOM.  Vikram Farrell MD  4/19/2018 12:40:22 PM  This report has been verified and signed electronically.

## 2018-05-09 ENCOUNTER — PATIENT OUTREACH (OUTPATIENT)
Dept: OTHER | Facility: OTHER | Age: 58
End: 2018-05-09

## 2018-05-09 NOTE — PROGRESS NOTES
Last 5 Patient Entered Readings                                      Current 30 Day Average: 121/77     Recent Readings 5/6/2018 5/6/2018 5/6/2018 5/2/2018 5/2/2018    SBP (mmHg) 125 121 123 119 118    DBP (mmHg) 76 77 73 74 71    Pulse 67 69 68 78 79            Digital [facility]: Health  Follow Up    Sent [employer] message to follow up with Dr. Parth Juarez.  Current BP average 121/77 mmHg is at goal, <130/80.

## 2018-05-10 ENCOUNTER — PATIENT MESSAGE (OUTPATIENT)
Dept: OTHER | Facility: OTHER | Age: 58
End: 2018-05-10

## 2018-05-14 RX ORDER — DEXTROAMPHETAMINE SACCHARATE, AMPHETAMINE ASPARTATE MONOHYDRATE, DEXTROAMPHETAMINE SULFATE AND AMPHETAMINE SULFATE 3.75; 3.75; 3.75; 3.75 MG/1; MG/1; MG/1; MG/1
15 CAPSULE, EXTENDED RELEASE ORAL EVERY MORNING
Qty: 30 CAPSULE | Refills: 0 | Status: SHIPPED | OUTPATIENT
Start: 2018-05-14 | End: 2018-05-16 | Stop reason: ALTCHOICE

## 2018-05-15 ENCOUNTER — PATIENT MESSAGE (OUTPATIENT)
Dept: PSYCHIATRY | Facility: CLINIC | Age: 58
End: 2018-05-15

## 2018-05-16 ENCOUNTER — OFFICE VISIT (OUTPATIENT)
Dept: PSYCHIATRY | Facility: CLINIC | Age: 58
End: 2018-05-16
Payer: COMMERCIAL

## 2018-05-16 VITALS
DIASTOLIC BLOOD PRESSURE: 74 MMHG | SYSTOLIC BLOOD PRESSURE: 120 MMHG | HEIGHT: 67 IN | BODY MASS INDEX: 26.73 KG/M2 | WEIGHT: 170.31 LBS | HEART RATE: 65 BPM

## 2018-05-16 DIAGNOSIS — F90.2 ADHD (ATTENTION DEFICIT HYPERACTIVITY DISORDER), COMBINED TYPE: ICD-10-CM

## 2018-05-16 DIAGNOSIS — F41.9 CHRONIC ANXIETY: ICD-10-CM

## 2018-05-16 DIAGNOSIS — F43.22 ADJUSTMENT DISORDER WITH ANXIOUS MOOD: Primary | ICD-10-CM

## 2018-05-16 PROCEDURE — 90791 PSYCH DIAGNOSTIC EVALUATION: CPT | Mod: S$GLB,,, | Performed by: SOCIAL WORKER

## 2018-05-16 PROCEDURE — 99214 OFFICE O/P EST MOD 30 MIN: CPT | Mod: S$GLB,,, | Performed by: PSYCHIATRY & NEUROLOGY

## 2018-05-16 PROCEDURE — 3008F BODY MASS INDEX DOCD: CPT | Mod: CPTII,S$GLB,, | Performed by: PSYCHIATRY & NEUROLOGY

## 2018-05-16 PROCEDURE — 3074F SYST BP LT 130 MM HG: CPT | Mod: CPTII,S$GLB,, | Performed by: PSYCHIATRY & NEUROLOGY

## 2018-05-16 PROCEDURE — 99999 PR PBB SHADOW E&M-EST. PATIENT-LVL III: CPT | Mod: PBBFAC,,, | Performed by: PSYCHIATRY & NEUROLOGY

## 2018-05-16 PROCEDURE — 3078F DIAST BP <80 MM HG: CPT | Mod: CPTII,S$GLB,, | Performed by: PSYCHIATRY & NEUROLOGY

## 2018-05-16 RX ORDER — LISDEXAMFETAMINE DIMESYLATE CAPSULES 20 MG/1
20 CAPSULE ORAL EVERY MORNING
Qty: 1 CAPSULE | Refills: 0
Start: 2018-05-16 | End: 2018-06-07 | Stop reason: SDUPTHER
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
## 2018-05-16 NOTE — PROGRESS NOTES
"Psychiatry Initial Visit (PhD/LCSW)  Diagnostic Interview - CPT 96400    Date: 5/16/2018    Site: [place]    Referral source: Dr. Garza    Clinical status of patient: Outpatient    Parth Juarez, a 58 y.o. male, for initial evaluation visit.  Met with patient.    Chief complaint/reason for encounter: anxiety    History of present illness: see clinician's confidential file    Pain: noncontributory    Symptoms:   · Mood: anxious mood  · Anxiety: excessive anxiety/worry  · Substance abuse: denied  · Cognitive functioning: intact  · Health behaviors: noncontributory    Psychiatric history: see clinician's confidential file    Medical history: noncontributory    Family history of psychiatric illness: not known    Social history (marriage, employment, etc.): client is a 58 year old   male,  to Reid (59) both whom are originally from [place] with two children: Parth, 31,  with 2 children; and Greg, 26,  no children.  One pet dog named "oRss" who is 14 years old.  Client's bio father Parth (89), also a physician, and client's mother-in-law, Damaris (87), also reside with client and his wife.  See clinician's confidential file for additional information.    Substance use:   Alcohol: infrequent   Drugs: none   Tobacco: none   Caffeine: one cafe con leche daily    Current medications and drug reactions (include OTC, herbal): see medication list - reviewed with client    Strengths and liabilities: Strength: Patient accepts guidance/feedback, Strength: Patient is expressive/articulate., Strength: Patient is intelligent., Strength: Patient is motivated for change., Strength: Patient is physically healthy., Strength: Patient has reasonable judgment., Strength: Patient is stable.    Current Evaluation:     Mental Status Exam:  General Appearance:  age appropriate, normal weight, neatly groomed   Speech: normal tone, normal rate, normal pitch, normal volume      Level of Cooperation: " cooperative      Thought Processes: normal and logical   Mood: steady, anxious      Thought Content: normal, no suicidality, no homicidality, delusions, or paranoia   Affect: congruent and appropriate   Orientation: Oriented x3   Memory: intact   Attention Span & Concentration: intact   Fund of General Knowledge: intact and appropriate to age and level of education   Abstract Reasoning: intact   Judgment & Insight: good, intact     Language  intact     Diagnostic Impression - Plan:       ICD-10-CM ICD-9-CM   1. Adjustment disorder with anxious mood F43.22 309.24       Plan:individual psychotherapy    Return to Clinic: 2 weeks    Length of Service (minutes): 45

## 2018-05-17 ENCOUNTER — PATIENT MESSAGE (OUTPATIENT)
Dept: PSYCHIATRY | Facility: CLINIC | Age: 58
End: 2018-05-17

## 2018-05-18 ENCOUNTER — PATIENT MESSAGE (OUTPATIENT)
Dept: PSYCHIATRY | Facility: CLINIC | Age: 58
End: 2018-05-18

## 2018-05-22 ENCOUNTER — PATIENT OUTREACH (OUTPATIENT)
Dept: OTHER | Facility: OTHER | Age: 58
End: 2018-05-22

## 2018-05-22 NOTE — PROGRESS NOTES
Last 5 Patient Entered Readings                                      Current 30 Day Average: 122/75     Recent Readings 5/21/2018 5/19/2018 5/19/2018 5/19/2018 5/19/2018    SBP (mmHg) 130 124 130 129 129    DBP (mmHg) 84 74 79 81 75    Pulse 67 61 62 62 64        Hypertension Medications             losartan-hydrochlorothiazide 100-25 mg (HYZAAR) 100-25 mg per tablet Take 1 tablet by mouth every morning.        Assessment/ Plan:   Called patient to follow up.   Per newly released 2017 ACC/ AHA HTN guidelines (goal of BP < 130/80), current 30-day average is well controlled.  Patient denies having questions or concerns. Instructed patient to call if he has any questions or concerns, patient confirms understanding.   Will continue to monitor. WCB in 3 months, sooner if BP begins to trend up or down.

## 2018-05-28 ENCOUNTER — PATIENT MESSAGE (OUTPATIENT)
Dept: ADMINISTRATIVE | Facility: OTHER | Age: 58
End: 2018-05-28

## 2018-05-30 ENCOUNTER — OFFICE VISIT (OUTPATIENT)
Dept: PSYCHIATRY | Facility: CLINIC | Age: 58
End: 2018-05-30
Payer: COMMERCIAL

## 2018-05-30 DIAGNOSIS — F90.2 ADHD (ATTENTION DEFICIT HYPERACTIVITY DISORDER), COMBINED TYPE: ICD-10-CM

## 2018-05-30 DIAGNOSIS — F43.22 ADJUSTMENT DISORDER WITH ANXIOUS MOOD: Primary | ICD-10-CM

## 2018-05-30 PROCEDURE — 90834 PSYTX W PT 45 MINUTES: CPT | Mod: S$GLB,,, | Performed by: SOCIAL WORKER

## 2018-05-30 NOTE — PROGRESS NOTES
Individual Psychotherapy (PhD/LCSW)    5/30/2018    Site:  [facility]         Therapeutic Intervention: Met with patient.  Outpatient - Insight oriented psychotherapy 45 min - CPT code 10972, Outpatient - Behavior modifying psychotherapy 45 min - CPT code 89763, Outpatient - Supportive psychotherapy 45 min - CPT Code 42668 and Outpatient - Interactive psychotherapy 45 min - CPT code 71388    Chief complaint/reason for encounter: anxiety     Interval history and content of current session: client presents with good eye contact, good insights, some anxiety noted.    Treatment plan:  · Target symptoms: anxiety   · Why chosen therapy is appropriate versus another modality: relevant to diagnosis, patient responds to this modality, evidence based practice  · Outcome monitoring methods: self-report, observation  · Therapeutic intervention type: insight oriented psychotherapy, behavior modifying psychotherapy, supportive psychotherapy, interactive psychotherapy    Risk parameters:  Patient reports no suicidal ideation  Patient reports no homicidal ideation  Patient reports no self-injurious behavior  Patient reports no violent behavior    Verbal deficits: None - highly articulate and expressive    Patient's response to intervention:  The patient's response to intervention is accepting and at times guarded    Progress toward goals and other mental status changes:  The patient's progress toward goals is good.    Diagnosis:     ICD-10-CM ICD-9-CM   1. Adjustment disorder with anxious mood F43.22 309.24       Plan:  individual psychotherapy    Return to clinic: 6 weeks    Length of Service (minutes): 45

## 2018-06-07 ENCOUNTER — PATIENT OUTREACH (OUTPATIENT)
Dept: OTHER | Facility: OTHER | Age: 58
End: 2018-06-07

## 2018-06-07 DIAGNOSIS — F90.0 ATTENTION DEFICIT HYPERACTIVITY DISORDER, INATTENTIVE TYPE: Primary | ICD-10-CM

## 2018-06-07 RX ORDER — LISDEXAMFETAMINE DIMESYLATE CAPSULES 20 MG/1
20 CAPSULE ORAL EVERY MORNING
Qty: 1 CAPSULE | Refills: 0 | Status: CANCELLED
Start: 2018-06-07

## 2018-06-07 RX ORDER — LISDEXAMFETAMINE DIMESYLATE CAPSULES 20 MG/1
20 CAPSULE ORAL EVERY MORNING
Qty: 30 CAPSULE | Refills: 0 | Status: SHIPPED | OUTPATIENT
Start: 2018-06-07 | End: 2018-06-12 | Stop reason: SDUPTHER

## 2018-06-07 NOTE — PROGRESS NOTES
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  Current BP average 122/71 mmHg is at goal, <130/80.  Reminded him to submit BP readings 1-3 times per week.

## 2018-06-10 ENCOUNTER — PATIENT MESSAGE (OUTPATIENT)
Dept: PSYCHIATRY | Facility: CLINIC | Age: 58
End: 2018-06-10

## 2018-06-11 ENCOUNTER — PATIENT MESSAGE (OUTPATIENT)
Dept: PSYCHIATRY | Facility: CLINIC | Age: 58
End: 2018-06-11

## 2018-06-11 RX ORDER — LISDEXAMFETAMINE DIMESYLATE CAPSULES 20 MG/1
20 CAPSULE ORAL EVERY MORNING
Qty: 30 CAPSULE | Refills: 0 | OUTPATIENT
Start: 2018-06-11

## 2018-06-12 RX ORDER — LISDEXAMFETAMINE DIMESYLATE CAPSULES 20 MG/1
20 CAPSULE ORAL EVERY MORNING
Qty: 30 CAPSULE | Refills: 0 | Status: SHIPPED | OUTPATIENT
Start: 2018-06-12 | End: 2018-07-11 | Stop reason: DRUGHIGH

## 2018-06-27 ENCOUNTER — PATIENT MESSAGE (OUTPATIENT)
Dept: PSYCHIATRY | Facility: CLINIC | Age: 58
End: 2018-06-27

## 2018-06-27 DIAGNOSIS — F41.9 CHRONIC ANXIETY: ICD-10-CM

## 2018-06-27 DIAGNOSIS — R97.20 ELEVATED PSA: ICD-10-CM

## 2018-06-27 RX ORDER — FINASTERIDE 5 MG/1
5 TABLET, FILM COATED ORAL DAILY
Qty: 90 TABLET | Refills: 3 | Status: SHIPPED | OUTPATIENT
Start: 2018-06-27 | End: 2018-09-19

## 2018-06-27 RX ORDER — CITALOPRAM 40 MG/1
20 TABLET, FILM COATED ORAL DAILY
Qty: 1 TABLET | Refills: 0
Start: 2018-06-27 | End: 2018-07-11 | Stop reason: DRUGHIGH

## 2018-07-11 ENCOUNTER — OFFICE VISIT (OUTPATIENT)
Dept: PSYCHIATRY | Facility: CLINIC | Age: 58
End: 2018-07-11
Payer: COMMERCIAL

## 2018-07-11 VITALS
HEART RATE: 66 BPM | HEIGHT: 67 IN | SYSTOLIC BLOOD PRESSURE: 125 MMHG | WEIGHT: 174.38 LBS | BODY MASS INDEX: 27.37 KG/M2 | DIASTOLIC BLOOD PRESSURE: 80 MMHG

## 2018-07-11 DIAGNOSIS — F43.22 ADJUSTMENT DISORDER WITH ANXIOUS MOOD: Primary | ICD-10-CM

## 2018-07-11 DIAGNOSIS — F90.2 ADHD (ATTENTION DEFICIT HYPERACTIVITY DISORDER), COMBINED TYPE: ICD-10-CM

## 2018-07-11 DIAGNOSIS — F41.9 ANXIETY: ICD-10-CM

## 2018-07-11 PROCEDURE — 3008F BODY MASS INDEX DOCD: CPT | Mod: CPTII,S$GLB,, | Performed by: PSYCHIATRY & NEUROLOGY

## 2018-07-11 PROCEDURE — 99214 OFFICE O/P EST MOD 30 MIN: CPT | Mod: S$GLB,,, | Performed by: PSYCHIATRY & NEUROLOGY

## 2018-07-11 PROCEDURE — 3079F DIAST BP 80-89 MM HG: CPT | Mod: CPTII,S$GLB,, | Performed by: PSYCHIATRY & NEUROLOGY

## 2018-07-11 PROCEDURE — 99999 PR PBB SHADOW E&M-EST. PATIENT-LVL III: CPT | Mod: PBBFAC,,, | Performed by: PSYCHIATRY & NEUROLOGY

## 2018-07-11 PROCEDURE — 3074F SYST BP LT 130 MM HG: CPT | Mod: CPTII,S$GLB,, | Performed by: PSYCHIATRY & NEUROLOGY

## 2018-07-11 PROCEDURE — 90834 PSYTX W PT 45 MINUTES: CPT | Mod: S$GLB,,, | Performed by: SOCIAL WORKER

## 2018-07-11 RX ORDER — METHYLPHENIDATE HYDROCHLORIDE 10 MG/1
TABLET ORAL
Qty: 60 TABLET | Refills: 0 | Status: SHIPPED | OUTPATIENT
Start: 2018-07-11 | End: 2018-09-19

## 2018-07-11 RX ORDER — CITALOPRAM 20 MG/1
20 TABLET, FILM COATED ORAL DAILY
Qty: 90 TABLET | Refills: 1 | Status: SHIPPED | OUTPATIENT
Start: 2018-07-11 | End: 2018-10-03 | Stop reason: SDUPTHER

## 2018-07-11 NOTE — PROGRESS NOTES
Individual Psychotherapy (PhD/LCSW)    7/11/2018    Site:  [place]         Therapeutic Intervention: Met with patient.  Outpatient - Insight oriented psychotherapy 45 min - CPT code 48600, Outpatient - Behavior modifying psychotherapy 45 min - CPT code 04831 and Outpatient - Supportive psychotherapy 45 min - CPT Code 07456    Chief complaint/reason for encounter: anxiety     Interval history and content of current session: client presents with good eye contact, direct speech, euthymic mood, stating he had a good time in [place] visiting family (went with spouse, mother in law, and father had already flown over), seeing his sister Zarina ([place]), who is doing well, and brother Kit ([place]).  Recognizes he needs to focus on exercising, eating earlier and lighter, more self care.  States he attended Epic training and things are better overall.  More focus, less scattered, generally doing much better.  Will see client again on an as needed basis.    Treatment plan:  · Target symptoms: anxiety   · Why chosen therapy is appropriate versus another modality: relevant to diagnosis, patient responds to this modality, evidence based practice  · Outcome monitoring methods: self-report, observation  · Therapeutic intervention type: insight oriented psychotherapy, behavior modifying psychotherapy, supportive psychotherapy    Risk parameters:  Patient reports no suicidal ideation  Patient reports no homicidal ideation  Patient reports no self-injurious behavior  Patient reports no violent behavior    Verbal deficits: None    Patient's response to intervention:  The patient's response to intervention is accepting, motivated.    Progress toward goals and other mental status changes:  The patient's progress toward goals is good.    Diagnosis:     ICD-10-CM ICD-9-CM   1. Adjustment disorder with anxious mood F43.22 309.24       Plan:  individual psychotherapy    Return to clinic: as needed    Length of Service (minutes): 45
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
## 2018-07-24 ENCOUNTER — PATIENT MESSAGE (OUTPATIENT)
Dept: PSYCHIATRY | Facility: CLINIC | Age: 58
End: 2018-07-24

## 2018-08-07 NOTE — PROGRESS NOTES
"Last 5 Patient Entered Readings                                      Current 30 Day Average: 130/81     Recent Readings 8/6/2018 8/6/2018 8/6/2018 8/6/2018 8/6/2018    SBP (mmHg) 131 132 127 131 134    DBP (mmHg) 88 88 87 91 93    Pulse 59 59 60 61 67            Digital Medicine: Health  Follow Up    Sent [facility] message to follow up with Dr. Parth Juarez.  Current BP average 130/81 mmHg is borderline controlled, <130/80.  Checking in about fluctuating BP readings on 8/6.    -Patient messaged back the following:  "There had been more stressful days. Yes I had a few salty snacks that I should avoid. I did not miss any medication but I missed my yoga and my lengthy walks. Thanks again. I will pray and keep working on all above things."     I provided feedback and will continue to monitor.       "

## 2018-08-08 ENCOUNTER — PATIENT MESSAGE (OUTPATIENT)
Dept: RESEARCH | Facility: HOSPITAL | Age: 58
End: 2018-08-08

## 2018-08-08 ENCOUNTER — PATIENT MESSAGE (OUTPATIENT)
Dept: UROLOGY | Facility: CLINIC | Age: 58
End: 2018-08-08

## 2018-08-08 ENCOUNTER — PATIENT MESSAGE (OUTPATIENT)
Dept: PSYCHIATRY | Facility: CLINIC | Age: 58
End: 2018-08-08

## 2018-08-14 ENCOUNTER — PATIENT OUTREACH (OUTPATIENT)
Dept: OTHER | Facility: OTHER | Age: 58
End: 2018-08-14

## 2018-08-14 DIAGNOSIS — I10 HYPERTENSION, UNSPECIFIED TYPE: Primary | ICD-10-CM

## 2018-08-14 NOTE — PROGRESS NOTES
Last 5 Patient Entered Readings                                      Current 30 Day Average: 133/83     Recent Readings 8/14/2018 8/14/2018 8/14/2018 8/14/2018 8/14/2018    SBP (mmHg) 126 127 140 125 139    DBP (mmHg) 74 74 79 80 81    Pulse 65 66 68 69 69        Hypertension Medications             losartan-hydrochlorothiazide 100-25 mg (HYZAAR) 100-25 mg per tablet Take 1 tablet by mouth every morning.        Plan:   Called patient to follow up, reviewed BP readings. Per 2017 ACC/ AHA HTN guidelines  (goal of BP < 130/80), current 30-day average is slightly uncontrolled, remains stable.  LVM, requested patient call back at his convenience.  Will continue to monitor. WCB in 2 weeks.

## 2018-08-15 ENCOUNTER — OFFICE VISIT (OUTPATIENT)
Dept: PSYCHIATRY | Facility: CLINIC | Age: 58
End: 2018-08-15
Payer: COMMERCIAL

## 2018-08-15 VITALS
WEIGHT: 174.25 LBS | HEART RATE: 72 BPM | BODY MASS INDEX: 27.35 KG/M2 | DIASTOLIC BLOOD PRESSURE: 75 MMHG | HEIGHT: 67 IN | SYSTOLIC BLOOD PRESSURE: 122 MMHG

## 2018-08-15 DIAGNOSIS — F41.9 ANXIETY: ICD-10-CM

## 2018-08-15 DIAGNOSIS — F90.2 ADHD (ATTENTION DEFICIT HYPERACTIVITY DISORDER), COMBINED TYPE: ICD-10-CM

## 2018-08-15 PROCEDURE — 99999 PR PBB SHADOW E&M-EST. PATIENT-LVL III: CPT | Mod: PBBFAC,,, | Performed by: PSYCHIATRY & NEUROLOGY

## 2018-08-15 PROCEDURE — 90833 PSYTX W PT W E/M 30 MIN: CPT | Mod: S$GLB,,, | Performed by: PSYCHIATRY & NEUROLOGY

## 2018-08-15 PROCEDURE — 3008F BODY MASS INDEX DOCD: CPT | Mod: CPTII,S$GLB,, | Performed by: PSYCHIATRY & NEUROLOGY

## 2018-08-15 PROCEDURE — 3078F DIAST BP <80 MM HG: CPT | Mod: CPTII,S$GLB,, | Performed by: PSYCHIATRY & NEUROLOGY

## 2018-08-15 PROCEDURE — 3074F SYST BP LT 130 MM HG: CPT | Mod: CPTII,S$GLB,, | Performed by: PSYCHIATRY & NEUROLOGY

## 2018-08-15 PROCEDURE — 99213 OFFICE O/P EST LOW 20 MIN: CPT | Mod: S$GLB,,, | Performed by: PSYCHIATRY & NEUROLOGY

## 2018-08-15 RX ORDER — BUPROPION HYDROCHLORIDE 100 MG/1
100 TABLET, EXTENDED RELEASE ORAL DAILY
Qty: 30 TABLET | Refills: 1 | Status: SHIPPED | OUTPATIENT
Start: 2018-08-15 | End: 2018-10-03 | Stop reason: SDUPTHER
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
## 2018-08-20 PROBLEM — F90.2 ADHD (ATTENTION DEFICIT HYPERACTIVITY DISORDER), COMBINED TYPE: Status: ACTIVE | Noted: 2018-08-20

## 2018-08-20 PROBLEM — F41.9 ANXIETY: Status: ACTIVE | Noted: 2018-08-20

## 2018-08-28 RX ORDER — METOPROLOL SUCCINATE 25 MG/1
25 TABLET, EXTENDED RELEASE ORAL DAILY
Qty: 90 TABLET | Refills: 3 | Status: SHIPPED | OUTPATIENT
Start: 2018-08-28 | End: 2019-08-02 | Stop reason: SDUPTHER

## 2018-08-28 NOTE — PROGRESS NOTES
Last 5 Patient Entered Readings                                      Current 30 Day Average: 133/84     Recent Readings 8/28/2018 8/28/2018 8/28/2018 8/24/2018 8/24/2018    SBP (mmHg) 126 130 139 133 138    DBP (mmHg) 81 82 86 80 49    Pulse 67 66 67 76 74        Hypertension Medications             losartan-hydrochlorothiazide 100-25 mg (HYZAAR) 100-25 mg per tablet Take 1 tablet by mouth every morning.        Assessment/ Plan:   Called patient to follow up, reviewed recent readings.   Per 2017 ACC/ AHA HTN guidelines (goal of BP < 130/80), current 30-day average is slightly uncontrolled. Patient attributes stress and slight weight gain to higher readings. Patient is not interested in a stronger thiazide or a CCB. Discussed with and instructed patient to start metoprolol er 25mg daily, patient confirms understanding.   Patient denies having questions or concerns. Instructed patient to call if he has any questions or concerns, patient confirms understanding.   Will continue to monitor. WCB in 2 weeks.

## 2018-09-05 ENCOUNTER — PATIENT MESSAGE (OUTPATIENT)
Dept: UROLOGY | Facility: CLINIC | Age: 58
End: 2018-09-05

## 2018-09-11 ENCOUNTER — PATIENT OUTREACH (OUTPATIENT)
Dept: OTHER | Facility: OTHER | Age: 58
End: 2018-09-11

## 2018-09-11 NOTE — PROGRESS NOTES
Last 5 Patient Entered Readings                                      Current 30 Day Average: 131/81     Recent Readings 9/4/2018 9/4/2018 9/4/2018 8/31/2018 8/31/2018    SBP (mmHg) 123 128 119 131 128    DBP (mmHg) 73 77 73 81 82    Pulse 54 57 55 68 68        Hypertension Medications             losartan-hydrochlorothiazide 100-25 mg (HYZAAR) 100-25 mg per tablet Take 1 tablet by mouth every morning.    metoprolol succinate (TOPROL-XL) 25 MG 24 hr tablet Take 1 tablet (25 mg total) by mouth once daily.        Assessment/ Plan:   Called patient to follow up since starting metoprolol er 25mg. Patient confirms he is tolerating well.   Reviewed recent readings. Per 2017 ACC/ AHA HTN guidelines (goal of BP < 130/80), current 30-day average is slightly uncontrolled; however, has improved with addition of metoprolol.   Patient denies having questions or concerns. Instructed patient to call if he has any questions or concerns, patient confirms understanding.   Will continue to monitor. WCB in 1 month.

## 2018-09-19 ENCOUNTER — LAB VISIT (OUTPATIENT)
Dept: LAB | Facility: HOSPITAL | Age: 58
End: 2018-09-19
Attending: UROLOGY
Payer: COMMERCIAL

## 2018-09-19 ENCOUNTER — LAB VISIT (OUTPATIENT)
Dept: LAB | Facility: HOSPITAL | Age: 58
End: 2018-09-19
Attending: FAMILY MEDICINE
Payer: COMMERCIAL

## 2018-09-19 ENCOUNTER — OFFICE VISIT (OUTPATIENT)
Dept: FAMILY MEDICINE | Facility: CLINIC | Age: 58
End: 2018-09-19
Payer: COMMERCIAL

## 2018-09-19 ENCOUNTER — OFFICE VISIT (OUTPATIENT)
Dept: UROLOGY | Facility: CLINIC | Age: 58
End: 2018-09-19
Payer: COMMERCIAL

## 2018-09-19 VITALS
WEIGHT: 173 LBS | BODY MASS INDEX: 27.15 KG/M2 | HEIGHT: 67 IN | HEART RATE: 56 BPM | SYSTOLIC BLOOD PRESSURE: 105 MMHG | DIASTOLIC BLOOD PRESSURE: 65 MMHG

## 2018-09-19 VITALS
SYSTOLIC BLOOD PRESSURE: 109 MMHG | RESPIRATION RATE: 12 BRPM | OXYGEN SATURATION: 96 % | HEART RATE: 57 BPM | BODY MASS INDEX: 27.23 KG/M2 | HEIGHT: 67 IN | DIASTOLIC BLOOD PRESSURE: 71 MMHG | WEIGHT: 173.5 LBS | TEMPERATURE: 98 F

## 2018-09-19 DIAGNOSIS — R97.20 ELEVATED PSA: ICD-10-CM

## 2018-09-19 DIAGNOSIS — R73.9 HYPERGLYCEMIA: ICD-10-CM

## 2018-09-19 DIAGNOSIS — R80.9 POSITIVE FOR MICROALBUMINURIA: ICD-10-CM

## 2018-09-19 DIAGNOSIS — I10 GOOD HYPERTENSION CONTROL: Primary | ICD-10-CM

## 2018-09-19 DIAGNOSIS — N40.0 BENIGN PROSTATIC HYPERPLASIA WITHOUT LOWER URINARY TRACT SYMPTOMS: Primary | ICD-10-CM

## 2018-09-19 DIAGNOSIS — E78.5 HYPERLIPIDEMIA, UNSPECIFIED HYPERLIPIDEMIA TYPE: ICD-10-CM

## 2018-09-19 DIAGNOSIS — D50.9 IRON DEFICIENCY ANEMIA, UNSPECIFIED IRON DEFICIENCY ANEMIA TYPE: ICD-10-CM

## 2018-09-19 DIAGNOSIS — N40.0 BENIGN PROSTATIC HYPERPLASIA, UNSPECIFIED WHETHER LOWER URINARY TRACT SYMPTOMS PRESENT: ICD-10-CM

## 2018-09-19 PROBLEM — Z12.5 SCREENING PSA (PROSTATE SPECIFIC ANTIGEN): Status: RESOLVED | Noted: 2018-04-02 | Resolved: 2018-09-19

## 2018-09-19 LAB
ALBUMIN SERPL BCP-MCNC: 4 G/DL
ALP SERPL-CCNC: 77 U/L
ALT SERPL W/O P-5'-P-CCNC: 30 U/L
ANION GAP SERPL CALC-SCNC: 5 MMOL/L
AST SERPL-CCNC: 24 U/L
BASOPHILS # BLD AUTO: 0.03 K/UL
BASOPHILS NFR BLD: 0.6 %
BILIRUB SERPL-MCNC: 0.6 MG/DL
BUN SERPL-MCNC: 25 MG/DL
CALCIUM SERPL-MCNC: 10.1 MG/DL
CHLORIDE SERPL-SCNC: 105 MMOL/L
CHOLEST SERPL-MCNC: 175 MG/DL
CHOLEST/HDLC SERPL: 2.9 {RATIO}
CO2 SERPL-SCNC: 32 MMOL/L
COMPLEXED PSA SERPL-MCNC: 3.1 NG/ML
CREAT SERPL-MCNC: 1.1 MG/DL
DIFFERENTIAL METHOD: NORMAL
EOSINOPHIL # BLD AUTO: 0.2 K/UL
EOSINOPHIL NFR BLD: 4.3 %
ERYTHROCYTE [DISTWIDTH] IN BLOOD BY AUTOMATED COUNT: 14.2 %
EST. GFR  (AFRICAN AMERICAN): >60 ML/MIN/1.73 M^2
EST. GFR  (NON AFRICAN AMERICAN): >60 ML/MIN/1.73 M^2
ESTIMATED AVG GLUCOSE: 108 MG/DL
FERRITIN SERPL-MCNC: 21 NG/ML
GLUCOSE SERPL-MCNC: 96 MG/DL
HBA1C MFR BLD HPLC: 5.4 %
HCT VFR BLD AUTO: 44.9 %
HDLC SERPL-MCNC: 61 MG/DL
HDLC SERPL: 34.9 %
HGB BLD-MCNC: 14.6 G/DL
IMM GRANULOCYTES # BLD AUTO: 0.01 K/UL
IMM GRANULOCYTES NFR BLD AUTO: 0.2 %
IRON SERPL-MCNC: 103 UG/DL
LDLC SERPL CALC-MCNC: 103.4 MG/DL
LYMPHOCYTES # BLD AUTO: 1.4 K/UL
LYMPHOCYTES NFR BLD: 30.2 %
MCH RBC QN AUTO: 29.9 PG
MCHC RBC AUTO-ENTMCNC: 32.5 G/DL
MCV RBC AUTO: 92 FL
MONOCYTES # BLD AUTO: 0.5 K/UL
MONOCYTES NFR BLD: 10.1 %
NEUTROPHILS # BLD AUTO: 2.5 K/UL
NEUTROPHILS NFR BLD: 54.6 %
NONHDLC SERPL-MCNC: 114 MG/DL
NRBC BLD-RTO: 0 /100 WBC
PLATELET # BLD AUTO: 198 K/UL
PMV BLD AUTO: 11.1 FL
POTASSIUM SERPL-SCNC: 5.2 MMOL/L
PROT SERPL-MCNC: 7.4 G/DL
RBC # BLD AUTO: 4.89 M/UL
SATURATED IRON: 22 %
SODIUM SERPL-SCNC: 142 MMOL/L
TOTAL IRON BINDING CAPACITY: 471 UG/DL
TRANSFERRIN SERPL-MCNC: 318 MG/DL
TRIGL SERPL-MCNC: 53 MG/DL
WBC # BLD AUTO: 4.64 K/UL

## 2018-09-19 PROCEDURE — 83540 ASSAY OF IRON: CPT

## 2018-09-19 PROCEDURE — 36415 COLL VENOUS BLD VENIPUNCTURE: CPT | Mod: PO

## 2018-09-19 PROCEDURE — 81002 URINALYSIS NONAUTO W/O SCOPE: CPT | Mod: S$GLB,ICN,, | Performed by: UROLOGY

## 2018-09-19 PROCEDURE — 85025 COMPLETE CBC W/AUTO DIFF WBC: CPT

## 2018-09-19 PROCEDURE — 84153 ASSAY OF PSA TOTAL: CPT

## 2018-09-19 PROCEDURE — 80061 LIPID PANEL: CPT

## 2018-09-19 PROCEDURE — 3008F BODY MASS INDEX DOCD: CPT | Mod: CPTII,S$GLB,, | Performed by: FAMILY MEDICINE

## 2018-09-19 PROCEDURE — 99999 PR PBB SHADOW E&M-EST. PATIENT-LVL III: CPT | Mod: PBBFAC,,, | Performed by: UROLOGY

## 2018-09-19 PROCEDURE — 36415 COLL VENOUS BLD VENIPUNCTURE: CPT

## 2018-09-19 PROCEDURE — 3074F SYST BP LT 130 MM HG: CPT | Mod: CPTII,S$GLB,, | Performed by: FAMILY MEDICINE

## 2018-09-19 PROCEDURE — 3074F SYST BP LT 130 MM HG: CPT | Mod: S$GLB,ICN,, | Performed by: UROLOGY

## 2018-09-19 PROCEDURE — 3078F DIAST BP <80 MM HG: CPT | Mod: CPTII,S$GLB,, | Performed by: FAMILY MEDICINE

## 2018-09-19 PROCEDURE — 80053 COMPREHEN METABOLIC PANEL: CPT

## 2018-09-19 PROCEDURE — 99214 OFFICE O/P EST MOD 30 MIN: CPT | Mod: 25,S$GLB,ICN, | Performed by: UROLOGY

## 2018-09-19 PROCEDURE — 99214 OFFICE O/P EST MOD 30 MIN: CPT | Mod: S$GLB,,, | Performed by: FAMILY MEDICINE

## 2018-09-19 PROCEDURE — 83036 HEMOGLOBIN GLYCOSYLATED A1C: CPT

## 2018-09-19 PROCEDURE — 99999 PR PBB SHADOW E&M-EST. PATIENT-LVL III: CPT | Mod: PBBFAC,,, | Performed by: FAMILY MEDICINE

## 2018-09-19 PROCEDURE — 3078F DIAST BP <80 MM HG: CPT | Mod: S$GLB,ICN,, | Performed by: UROLOGY

## 2018-09-19 PROCEDURE — 82728 ASSAY OF FERRITIN: CPT

## 2018-09-19 PROCEDURE — 3008F BODY MASS INDEX DOCD: CPT | Mod: S$GLB,ICN,, | Performed by: UROLOGY

## 2018-09-19 RX ORDER — FINASTERIDE 5 MG/1
5 TABLET, FILM COATED ORAL DAILY
Qty: 90 TABLET | Refills: 3 | Status: SHIPPED | OUTPATIENT
Start: 2018-09-19 | End: 2020-01-28

## 2018-09-19 NOTE — PROGRESS NOTES
Subjective:       Patient ID: Parth Juarez is a 58 y.o. male.    Chief Complaint: Follow-up (6mth f/u hypertension)    HPI  Review of Systems   Constitutional: Negative for fatigue and unexpected weight change.   Respiratory: Negative for chest tightness and shortness of breath.    Cardiovascular: Negative for chest pain, palpitations and leg swelling.   Gastrointestinal: Negative for abdominal pain.   Musculoskeletal: Negative for arthralgias and neck pain.   Neurological: Negative for dizziness, syncope, light-headedness and headaches.       Patient Active Problem List   Diagnosis    Hyperlipidemia    Good hypertension control    Exercise-induced asthma    Positive for microalbuminuria    Chronic anxiety    Hyperglycemia    Attention deficit disorder    Anemia    NICCI (iron deficiency anemia)    ADHD (attention deficit hyperactivity disorder), combined type    Anxiety     Patient is here for a chronic conditions follow up.    No visits with results within 1 Month(s) from this visit.   Latest known visit with results is:   Lab Visit on 03/29/2018   Component Date Value Ref Range Status    Fecal Immunochemical Test (iFOBT) 03/29/2018 Positive* Negative Final       Had + FIt and then egd, colonoscopy and capsule endoscopy Dr. Medrano with no source of bleeding identified.  Has not donated blood since labs last drawn    Mood -stopped stimulants and is now on wellbutrin which is working well for both mood and focus.  Under care of psych Dr. johnson      Objective:      Physical Exam   Constitutional: He is oriented to person, place, and time. He appears well-developed and well-nourished.   Cardiovascular: Normal rate, regular rhythm and normal heart sounds.   Pulmonary/Chest: Effort normal and breath sounds normal.   Abdominal: Soft. Bowel sounds are normal. He exhibits no distension. There is no tenderness. There is no guarding.   Musculoskeletal: He exhibits no edema.   Neurological: He is alert and oriented  to person, place, and time.   Skin: Skin is warm and dry.   Psychiatric: He has a normal mood and affect.   Nursing note and vitals reviewed.      Assessment:       1. Good hypertension control    2. Hyperlipidemia, unspecified hyperlipidemia type    3. Positive for microalbuminuria    4. Iron deficiency anemia, unspecified iron deficiency anemia type    5. Hyperglycemia        Plan:       1. Good hypertension control  Controlled on current medications.  Continue current medications.      2. Hyperlipidemia, unspecified hyperlipidemia type  Stable condition.  Continue current medications.  Will adjust based on lab findings or if condition changes.    - Comprehensive metabolic panel; Future  - Lipid panel; Future    3. Positive for microalbuminuria  Cont HTN mgmt and ARB    4. Iron deficiency anemia, unspecified iron deficiency anemia type  Screen and treat as indicated:    - CBC auto differential; Future  - Ferritin; Future  - Iron and TIBC; Future    5. Hyperglycemia   Your blood sugar is borderline high.  This means you are at risk for developing type 2 diabetes mellitus.  To lessen your risk you should exercise regularly, avoid excess carbohydrates and work toward a body mass index of less than 25.    Screen and treat as indicated:    - Hemoglobin A1c; Future        Time spent with patient: 20 minutes    Patient with be reevaluated in 6 months or sooner prn    Greater than 50% of this visit was spent counseling as described in above documentation:Yes

## 2018-09-19 NOTE — PROGRESS NOTES
Subjective:       Patient ID: Parth Juarez is a 58 y.o. male.    Chief Complaint:   DATE OF VISIT:  09/19/2018.    CHIEF COMPLAINT:  BPH, history of mild elevation of the PSA.     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  The patient referred to have no nocturia,  dysuria or hematuria.  The flow is adequate and he feels like he can empty  the bladder satisfactorily.  It is to be noted that the patient is taking  finasteride 5 mg daily.    FAMILY HISTORY:  His family history is negative for prostate CA, but his  father suffered a bladder CA in the past with no significant recurrences.    PAST MEDICAL AND PAST SURGICAL HISTORY:  Well documented in the medical  record including medications and allergies and all these were reviewed by  me during this visit.    The urinalysis today is completely clear.  The last PSA was done in  03/21/2018, 4.4.    The patient denies any side effects from finasteride.        EOR/HN dd: 09/19/2018 16:51:15 (CDT)   td: 09/19/2018 22:40:11 (CDT)  Doc ID #[id number]   Job ID #[id number]    CC:            HPI  Review of Systems   Constitutional: Negative for activity change and appetite change.   HENT: Negative.    Eyes: Negative for discharge.   Respiratory: Negative for cough and shortness of breath.    Cardiovascular: Negative for chest pain and palpitations.   Gastrointestinal: Negative for abdominal distention, abdominal pain, constipation and vomiting.   Genitourinary: Negative for discharge, dysuria, flank pain, frequency, hematuria, testicular pain and urgency.   Musculoskeletal: Negative for arthralgias.   Skin: Negative for rash.   Neurological: Negative for dizziness.   Psychiatric/Behavioral: The patient is not nervous/anxious.        Objective:      Physical Exam   Constitutional: He appears well-developed and well-nourished.   HENT:   Head: Normocephalic.   Eyes: Pupils are equal, round, and reactive to light.   Neck: Normal  range of motion.   Cardiovascular: Normal rate.    Pulmonary/Chest: Effort normal.   Abdominal: Soft. He exhibits no distension and no mass. There is no tenderness. Hernia confirmed negative in the right inguinal area and confirmed negative in the left inguinal area.   Genitourinary: Rectum normal, prostate normal and penis normal. Rectal exam shows no external hemorrhoid, no mass and no tenderness. Prostate is not enlarged and not tender. Right testis shows no mass and no tenderness. Left testis shows no mass and no tenderness. No discharge found.   Musculoskeletal: Normal range of motion.   Neurological: He is alert.   Skin: Skin is warm.     Psychiatric: He has a normal mood and affect.       Assessment:       1. Benign prostatic hyperplasia without lower urinary tract symptoms    2. Elevated PSA        Plan:       Benign prostatic hyperplasia without lower urinary tract symptoms  -     POCT URINE DIPSTICK WITHOUT MICROSCOPE    Elevated PSA    PSA to be done today. Keep on Finasteride 5 mg po qd. RTC 1 yr.

## 2018-09-19 NOTE — PATIENT INSTRUCTIONS
Established High Blood Pressure    High blood pressure (hypertension) is a chronic disease. Often, healthcare providers dont know what causes it. But it can be caused by certain health conditions and medicines.  If you have high blood pressure, you may not have any symptoms. If you do have symptoms, they may include headache, dizziness, changes in your vision, chest pain, and shortness of breath. But even without symptoms, high blood pressure thats not treated raises your risk for heart attack and stroke. High blood pressure is a serious health risk and shouldnt be ignored.  A blood pressure reading is made up of two numbers: a higher number over a lower number. The top number is the systolic pressure. The bottom number is the diastolic pressure. A normal blood pressure is a systolic pressure of  less than 120 over a diastolic pressure of less than 80. You will see your blood pressure readings written together. For example, a person with a systolic pressure of 188 and a diastolic pressure of 78 will have 118/78 written in the medical record.  High blood pressure is when either the top number is 140 or higher, or the bottom number is 90 or higher. This must be the result when taking your blood pressure a number of times. The blood pressures between normal and high are called prehypertension.  Home care  If you have high blood pressure, you should do what is listed below to lower your blood pressure. If you are taking medicines for high blood pressure, these methods may reduce or end your need for medicines in the future.  · Begin a weight-loss program if you are overweight.  · Cut back on how much salt you get in your diet. Heres how to do this:  ¨ Dont eat foods that have a lot of salt. These include olives, pickles, smoked meats, and salted potato chips.  ¨ Dont add salt to your food at the table.  ¨ Use only small amounts of salt when cooking.  · Start an exercise program. Talk with your healthcare  provider about the type of exercise program that would be best for you. It doesn't have to be hard. Even brisk walking for 20 minutes 3 times a week is a good form of exercise.  · Dont take medicines that stimulate the heart. This includes many over-the-counter cold and sinus decongestant pills and sprays, as well as diet pills. Check the warnings about hypertension on the label. Before buying any over-the-counter medicines or supplements, always ask the pharmacist about the product's potential interaction with your high blood pressure and your high blood pressure medicines.  · Stimulants such as amphetamine or cocaine could be deadly for someone with high blood pressure. Never take these.  · Limit how much caffeine you get in your diet. Switch to caffeine-free products.  · Stop smoking. If you are a long-time smoker, this can be hard. Talk to your healthcare provider about medicines and nicotine replacement options to help you. Also, enroll in a stop-smoking program to make it more likely that you will quit for good.  · Learn how to handle stress. This is an important part of any program to lower blood pressure. Learn about relaxation methods like meditation, yoga, or biofeedback.  · If your provider prescribed medicines, take them exactly as directed. Missing doses may cause your blood pressure get out of control.  · If you miss a dose or doses, check with your healthcare provider or pharmacist about what to do.  · Consider buying an automatic blood pressure machine. Ask your provider for a recommendation. You can get one of these at most pharmacies.     The American [employer] Association recommends the following guidelines for home blood pressure monitoring:  · Don't smoke or drink coffee for 30 minutes before taking your blood pressure.  · Go to the bathroom before the test.  · Relax for 5 minutes before taking the measurement.  · Sit with your back supported (don't sit on a couch or soft chair); keep your feet on  the floor uncrossed. Place your arm on a solid flat surface (like a table) with the upper part of the arm at heart level. Place the middle of the cuff directly above the eye of the elbow. Check the monitor's instruction manual for an illustration.  · Take multiple readings. When you measure, take 2 to 3 readings one minute apart and record all of the results.  · Take your blood pressure at the same time every day, or as your healthcare provider recommends.  · Record the date, time, and blood pressure reading.  · Take the record with you to your next medical appointment. If your blood pressure monitor has a built-in memory, simply take the monitor with you to your next appointment.  · Call your provider if you have several high readings. Don't be frightened by a single high blood pressure reading, but if you get several high readings, check in with your healthcare provider.  · Note: When blood pressure reaches a systolic (top number) of 180 or higher OR diastolic (bottom number) of 110 or higher, seek emergency medical treatment.  Follow-up care  You will need to see your healthcare provider regularly. This is to check your blood pressure and to make changes to your medicines. Make a follow-up appointment as directed. Bring the record of your home blood pressure readings to the appointment.  When to seek medical advice  Call your healthcare provider right away if any of these occur:  · Blood pressure reaches a systolic (upper number) of 180 or higher OR a diastolic (bottom number) of 110 or higher  · Chest pain or shortness of breath  · Severe headache  · Throbbing or rushing sound in the ears  · Nosebleed  · Sudden severe pain in your belly (abdomen)  · Extreme drowsiness, confusion, or fainting  · Dizziness or spinning sensation (vertigo)  · Weakness of an arm or leg or one side of the face  · You have problems speaking or seeing   Date Last Reviewed: 12/1/2016  © 4921-0206 [employer]. [address] [place] [address]  [place] [address]. All rights reserved. This information is not intended as a substitute for professional medical care. Always follow your healthcare professional's instructions.

## 2018-09-20 LAB
BILIRUB SERPL-MCNC: NEGATIVE MG/DL
BLOOD URINE, POC: NEGATIVE
COLOR, POC UA: NORMAL
GLUCOSE UR QL STRIP: NEGATIVE
KETONES UR QL STRIP: NEGATIVE
LEUKOCYTE ESTERASE URINE, POC: NEGATIVE
NITRITE, POC UA: NEGATIVE
PH, POC UA: 7
PROTEIN, POC: NEGATIVE
SPECIFIC GRAVITY, POC UA: 1010
UROBILINOGEN, POC UA: NEGATIVE

## 2018-09-22 ENCOUNTER — PATIENT MESSAGE (OUTPATIENT)
Dept: GASTROENTEROLOGY | Facility: CLINIC | Age: 58
End: 2018-09-22

## 2018-09-24 ENCOUNTER — PATIENT MESSAGE (OUTPATIENT)
Dept: UROLOGY | Facility: CLINIC | Age: 58
End: 2018-09-24

## 2018-10-03 ENCOUNTER — OFFICE VISIT (OUTPATIENT)
Dept: PSYCHIATRY | Facility: CLINIC | Age: 58
End: 2018-10-03
Payer: COMMERCIAL

## 2018-10-03 VITALS
SYSTOLIC BLOOD PRESSURE: 116 MMHG | DIASTOLIC BLOOD PRESSURE: 73 MMHG | HEART RATE: 66 BPM | BODY MASS INDEX: 27.71 KG/M2 | HEIGHT: 67 IN | WEIGHT: 176.56 LBS

## 2018-10-03 DIAGNOSIS — F41.9 ANXIETY: ICD-10-CM

## 2018-10-03 DIAGNOSIS — F90.2 ADHD (ATTENTION DEFICIT HYPERACTIVITY DISORDER), COMBINED TYPE: ICD-10-CM

## 2018-10-03 PROCEDURE — 99213 OFFICE O/P EST LOW 20 MIN: CPT | Mod: S$GLB,,, | Performed by: PSYCHIATRY & NEUROLOGY

## 2018-10-03 PROCEDURE — 3078F DIAST BP <80 MM HG: CPT | Mod: CPTII,S$GLB,, | Performed by: PSYCHIATRY & NEUROLOGY

## 2018-10-03 PROCEDURE — 3074F SYST BP LT 130 MM HG: CPT | Mod: CPTII,S$GLB,, | Performed by: PSYCHIATRY & NEUROLOGY

## 2018-10-03 PROCEDURE — 99999 PR PBB SHADOW E&M-EST. PATIENT-LVL III: CPT | Mod: PBBFAC,,, | Performed by: PSYCHIATRY & NEUROLOGY

## 2018-10-03 PROCEDURE — 3008F BODY MASS INDEX DOCD: CPT | Mod: CPTII,S$GLB,, | Performed by: PSYCHIATRY & NEUROLOGY

## 2018-10-03 PROCEDURE — 90833 PSYTX W PT W E/M 30 MIN: CPT | Mod: S$GLB,,, | Performed by: PSYCHIATRY & NEUROLOGY

## 2018-10-03 RX ORDER — CITALOPRAM 20 MG/1
20 TABLET, FILM COATED ORAL DAILY
Qty: 90 TABLET | Refills: 1 | Status: SHIPPED | OUTPATIENT
Start: 2018-10-03 | End: 2019-01-30 | Stop reason: SDUPTHER

## 2018-10-03 RX ORDER — BUPROPION HYDROCHLORIDE 100 MG/1
100 TABLET, EXTENDED RELEASE ORAL 2 TIMES DAILY
Qty: 180 TABLET | Refills: 0 | Status: SHIPPED | OUTPATIENT
Start: 2018-10-03 | End: 2019-01-30 | Stop reason: DRUGHIGH
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
## 2018-10-07 RX ORDER — BUPROPION HYDROCHLORIDE 100 MG/1
TABLET, EXTENDED RELEASE ORAL
Qty: 30 TABLET | Refills: 1 | OUTPATIENT
Start: 2018-10-07

## 2018-10-09 ENCOUNTER — PATIENT OUTREACH (OUTPATIENT)
Dept: OTHER | Facility: OTHER | Age: 58
End: 2018-10-09

## 2018-10-09 NOTE — PROGRESS NOTES
Last 5 Patient Entered Readings                                      Current 30 Day Average: 114/71     Recent Readings 10/4/2018 10/4/2018 10/4/2018 10/3/2018 10/3/2018    SBP (mmHg) 108 107 107 112 130    DBP (mmHg) 63 66 69 69 80    Pulse 59 59 60 60 59        Hypertension Medications             losartan-hydrochlorothiazide 100-25 mg (HYZAAR) 100-25 mg per tablet Take 1 tablet by mouth every morning.    metoprolol succinate (TOPROL-XL) 25 MG 24 hr tablet Take 1 tablet (25 mg total) by mouth once daily.        Plan:   Called patient to follow up, reviewed BP readings. Per 2017 ACC/ AHA HTN guidelines  (goal of BP < 130/80), current 30-day average is well controlled.  Patient was seen by PCP on 9/19, BP was 109/71.  LVM, requested patient call back at his convenience if he has any questions or concerns.  Will continue to monitor. WCB in 3 months, sooner if BP begins to trend up or down.

## 2018-11-09 DIAGNOSIS — R05.9 COUGH: Primary | ICD-10-CM

## 2018-11-09 RX ORDER — PROMETHAZINE HYDROCHLORIDE AND DEXTROMETHORPHAN HYDROBROMIDE 6.25; 15 MG/5ML; MG/5ML
5 SYRUP ORAL EVERY 4 HOURS PRN
Qty: 240 ML | Refills: 2 | Status: SHIPPED | OUTPATIENT
Start: 2018-11-09 | End: 2018-11-19

## 2018-11-09 NOTE — PROGRESS NOTES
Patient complains of cough x 4 days and requests promethazine DM syrup. He tried Benadryl and coricidin HBP and these have not helped.  He is having trouble sleeping due to cough.

## 2018-11-16 ENCOUNTER — PATIENT OUTREACH (OUTPATIENT)
Dept: OTHER | Facility: OTHER | Age: 58
End: 2018-11-16

## 2018-11-16 NOTE — PROGRESS NOTES
Last 5 Patient Entered Readings                                      Current 30 Day Average: 122/80     Recent Readings 11/11/2018 11/11/2018 11/10/2018 11/10/2018 11/6/2018    SBP (mmHg) 109 120 122 125 110    DBP (mmHg) 74 70 78 75 69    Pulse 59 57 55 56 55            Digital Medicine: Health  Follow Up    Sent [employer] message to follow up with Dr. Parth Juarez.  Current BP average 122/80 mmHg is at goal, <130/80.

## 2018-12-05 ENCOUNTER — PATIENT MESSAGE (OUTPATIENT)
Dept: PSYCHIATRY | Facility: CLINIC | Age: 58
End: 2018-12-05

## 2018-12-30 ENCOUNTER — PATIENT MESSAGE (OUTPATIENT)
Dept: FAMILY MEDICINE | Facility: CLINIC | Age: 58
End: 2018-12-30

## 2018-12-30 DIAGNOSIS — I10 ESSENTIAL HYPERTENSION: ICD-10-CM

## 2018-12-31 ENCOUNTER — PATIENT OUTREACH (OUTPATIENT)
Dept: OTHER | Facility: OTHER | Age: 58
End: 2018-12-31

## 2018-12-31 DIAGNOSIS — I10 ESSENTIAL HYPERTENSION: ICD-10-CM

## 2018-12-31 RX ORDER — LOSARTAN POTASSIUM AND HYDROCHLOROTHIAZIDE 25; 100 MG/1; MG/1
1 TABLET ORAL EVERY MORNING
Qty: 90 TABLET | Refills: 3 | Status: SHIPPED | OUTPATIENT
Start: 2018-12-31 | End: 2019-01-02

## 2018-12-31 NOTE — PROGRESS NOTES
Last 5 Patient Entered Readings                                      Current 30 Day Average: 126/85     Recent Readings 12/30/2018 12/30/2018 12/30/2018 12/30/2018 12/30/2018    SBP (mmHg) 117 123 130 128 131    DBP (mmHg) 85 88 84 88 83    Pulse 60 61 62 61 61        Hypertension Medications             losartan-hydrochlorothiazide 100-25 mg (HYZAAR) 100-25 mg per tablet Take 1 tablet by mouth every morning.    metoprolol succinate (TOPROL-XL) 25 MG 24 hr tablet Take 1 tablet (25 mg total) by mouth once daily.        Plan:   Called patient to follow up, reviewed BP readings. Per 2017 ACC/ AHA HTN guidelines  (goal of BP < 130/80), current 30-day average is slightly uncontrolled, remains stable. Would like to discuss changing hctz to chlorthalidone 2/2 elevated DBP.  LVM, requested patient call back at his convenience.  Will continue to monitor. WCB in 1 month.

## 2019-01-02 RX ORDER — LOSARTAN POTASSIUM AND HYDROCHLOROTHIAZIDE 25; 100 MG/1; MG/1
TABLET ORAL
Qty: 90 TABLET | Refills: 3 | Status: SHIPPED | OUTPATIENT
Start: 2019-01-02 | End: 2019-10-28

## 2019-01-03 NOTE — PROGRESS NOTES
Last 5 Patient Entered Readings                                      Current 30 Day Average: 125/82     Recent Readings 1/3/2019 1/3/2019 1/3/2019 1/1/2019 1/1/2019    SBP (mmHg) 120 122 128 121 117    DBP (mmHg) 67 75 75 75 75    Pulse 61 62 63 57 58          Digital Medicine: Health  Follow Up    Sent [employer] message to follow up with Dr. Parth Juarez.  Current BP average 125/82 mmHg is borderline at goal, <130/80.

## 2019-01-27 ENCOUNTER — PATIENT MESSAGE (OUTPATIENT)
Dept: ADMINISTRATIVE | Facility: OTHER | Age: 59
End: 2019-01-27

## 2019-01-28 ENCOUNTER — PATIENT MESSAGE (OUTPATIENT)
Dept: ADMINISTRATIVE | Facility: OTHER | Age: 59
End: 2019-01-28

## 2019-01-28 DIAGNOSIS — E78.5 HYPERLIPIDEMIA, UNSPECIFIED HYPERLIPIDEMIA TYPE: ICD-10-CM

## 2019-01-28 RX ORDER — ATORVASTATIN CALCIUM 40 MG/1
40 TABLET, FILM COATED ORAL DAILY
Qty: 90 TABLET | Refills: 3 | Status: SHIPPED | OUTPATIENT
Start: 2019-01-28 | End: 2020-05-30 | Stop reason: SDUPTHER

## 2019-01-29 NOTE — PROGRESS NOTES
Last 5 Patient Entered Readings                                      Current 30 Day Average: 124/82     Recent Readings 1/29/2019 1/29/2019 1/29/2019 1/23/2019 1/23/2019    SBP (mmHg) 113 137 136 140 138    DBP (mmHg) 71 84 81 90 82    Pulse 65 62 64 59 61        Hypertension Medications             losartan-hydrochlorothiazide 100-25 mg (HYZAAR) 100-25 mg per tablet TAKE ONE TABLET BY MOUTH IN THE MORNING    metoprolol succinate (TOPROL-XL) 25 MG 24 hr tablet Take 1 tablet (25 mg total) by mouth once daily.        Plan:   Called patient to follow up, reviewed BP readings. Per 2017 ACC/ AHA HTN guidelines  (goal of BP < 130/80), current 30-day average is slightly uncontrolled, remains stable. Would like to discuss changing hctz to chlorthalidone 2/2 elevated DBP.  LVM, requested patient call back at his convenience.  Will continue to monitor. WCB in 1 month.

## 2019-01-30 ENCOUNTER — OFFICE VISIT (OUTPATIENT)
Dept: PSYCHIATRY | Facility: CLINIC | Age: 59
End: 2019-01-30
Payer: COMMERCIAL

## 2019-01-30 VITALS
SYSTOLIC BLOOD PRESSURE: 120 MMHG | BODY MASS INDEX: 29.15 KG/M2 | WEIGHT: 185.75 LBS | HEIGHT: 67 IN | DIASTOLIC BLOOD PRESSURE: 73 MMHG | HEART RATE: 61 BPM

## 2019-01-30 DIAGNOSIS — F90.2 ADHD (ATTENTION DEFICIT HYPERACTIVITY DISORDER), COMBINED TYPE: ICD-10-CM

## 2019-01-30 PROCEDURE — 3074F SYST BP LT 130 MM HG: CPT | Mod: CPTII,S$GLB,, | Performed by: PSYCHIATRY & NEUROLOGY

## 2019-01-30 PROCEDURE — 99999 PR PBB SHADOW E&M-EST. PATIENT-LVL III: ICD-10-PCS | Mod: PBBFAC,,, | Performed by: PSYCHIATRY & NEUROLOGY

## 2019-01-30 PROCEDURE — 3078F PR MOST RECENT DIASTOLIC BLOOD PRESSURE < 80 MM HG: ICD-10-PCS | Mod: CPTII,S$GLB,, | Performed by: PSYCHIATRY & NEUROLOGY

## 2019-01-30 PROCEDURE — 99213 PR OFFICE/OUTPT VISIT, EST, LEVL III, 20-29 MIN: ICD-10-PCS | Mod: S$GLB,,, | Performed by: PSYCHIATRY & NEUROLOGY

## 2019-01-30 PROCEDURE — 3008F BODY MASS INDEX DOCD: CPT | Mod: CPTII,S$GLB,, | Performed by: PSYCHIATRY & NEUROLOGY

## 2019-01-30 PROCEDURE — 3078F DIAST BP <80 MM HG: CPT | Mod: CPTII,S$GLB,, | Performed by: PSYCHIATRY & NEUROLOGY

## 2019-01-30 PROCEDURE — 99999 PR PBB SHADOW E&M-EST. PATIENT-LVL III: CPT | Mod: PBBFAC,,, | Performed by: PSYCHIATRY & NEUROLOGY

## 2019-01-30 PROCEDURE — 99213 OFFICE O/P EST LOW 20 MIN: CPT | Mod: S$GLB,,, | Performed by: PSYCHIATRY & NEUROLOGY

## 2019-01-30 PROCEDURE — 3074F PR MOST RECENT SYSTOLIC BLOOD PRESSURE < 130 MM HG: ICD-10-PCS | Mod: CPTII,S$GLB,, | Performed by: PSYCHIATRY & NEUROLOGY

## 2019-01-30 PROCEDURE — 3008F PR BODY MASS INDEX (BMI) DOCUMENTED: ICD-10-PCS | Mod: CPTII,S$GLB,, | Performed by: PSYCHIATRY & NEUROLOGY

## 2019-01-30 RX ORDER — BUPROPION HYDROCHLORIDE 150 MG/1
150 TABLET ORAL EVERY MORNING
Qty: 30 TABLET | Refills: 5 | Status: SHIPPED | OUTPATIENT
Start: 2019-01-30 | End: 2019-04-17 | Stop reason: SDUPTHER

## 2019-01-30 RX ORDER — CITALOPRAM 20 MG/1
20 TABLET, FILM COATED ORAL DAILY
Qty: 90 TABLET | Refills: 1 | Status: SHIPPED | OUTPATIENT
Start: 2019-01-30 | End: 2019-04-17 | Stop reason: SDUPTHER
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
## 2019-02-26 NOTE — PROGRESS NOTES
HPI:  Called patient to follow up. Patient reports adherence to medication regimen daily and denies missed doses. Patient denies hypotensive s/sx (lightheadedness, dizziness, nausea, fatigue); patient denies hypertensive s/sx (SOB, CP, severe headaches, changes in vision, dizziness, fatigue, confusion, anxiety, nosebleeds).     Last 5 Patient Entered Readings                                      Current 30 Day Average: 121/79     Recent Readings 2/22/2019 2/22/2019 2/21/2019 2/21/2019 2/21/2019    SBP (mmHg) 135 135 135 128 131    DBP (mmHg) 86 89 59 85 85    Pulse 63 69 67 66 65        Assessment:  Reviewed recent readings. Per 2017 ACC/ AHA HTN guidelines (goal of BP < 130/80), current 30-day average is controlled.     Plan:  Continue current medication regimen.   Patients health , Yeni Lopez, will be following up as scheduled.   I will continue to monitor regularly and will follow-up in 6 weeks.    Current medication regimen:  Hypertension Medications             losartan-hydrochlorothiazide 100-25 mg (HYZAAR) 100-25 mg per tablet TAKE ONE TABLET BY MOUTH IN THE MORNING    metoprolol succinate (TOPROL-XL) 25 MG 24 hr tablet Take 1 tablet (25 mg total) by mouth once daily.         Patient denies having questions or concerns. Patient has my contact information and knows to call with any concerns or clinical changes.

## 2019-03-25 ENCOUNTER — PATIENT MESSAGE (OUTPATIENT)
Dept: FAMILY MEDICINE | Facility: CLINIC | Age: 59
End: 2019-03-25

## 2019-03-25 ENCOUNTER — OFFICE VISIT (OUTPATIENT)
Dept: FAMILY MEDICINE | Facility: CLINIC | Age: 59
End: 2019-03-25
Payer: COMMERCIAL

## 2019-03-25 VITALS
DIASTOLIC BLOOD PRESSURE: 71 MMHG | RESPIRATION RATE: 14 BRPM | SYSTOLIC BLOOD PRESSURE: 117 MMHG | OXYGEN SATURATION: 96 % | BODY MASS INDEX: 29.13 KG/M2 | HEART RATE: 70 BPM | WEIGHT: 185.63 LBS | HEIGHT: 67 IN | TEMPERATURE: 99 F

## 2019-03-25 DIAGNOSIS — T14.8XXA ABRASION OF SKIN: ICD-10-CM

## 2019-03-25 DIAGNOSIS — R50.9 FEVER, UNSPECIFIED FEVER CAUSE: Primary | ICD-10-CM

## 2019-03-25 LAB
INFLUENZA A, MOLECULAR: POSITIVE
INFLUENZA B, MOLECULAR: NEGATIVE
SPECIMEN SOURCE: ABNORMAL

## 2019-03-25 PROCEDURE — 99213 OFFICE O/P EST LOW 20 MIN: CPT | Mod: S$GLB,,, | Performed by: PHYSICIAN ASSISTANT

## 2019-03-25 PROCEDURE — 99999 PR PBB SHADOW E&M-EST. PATIENT-LVL IV: ICD-10-PCS | Mod: PBBFAC,,, | Performed by: PHYSICIAN ASSISTANT

## 2019-03-25 PROCEDURE — 3074F SYST BP LT 130 MM HG: CPT | Mod: CPTII,S$GLB,, | Performed by: PHYSICIAN ASSISTANT

## 2019-03-25 PROCEDURE — 3074F PR MOST RECENT SYSTOLIC BLOOD PRESSURE < 130 MM HG: ICD-10-PCS | Mod: CPTII,S$GLB,, | Performed by: PHYSICIAN ASSISTANT

## 2019-03-25 PROCEDURE — 87502 INFLUENZA DNA AMP PROBE: CPT | Mod: PO

## 2019-03-25 PROCEDURE — 3008F BODY MASS INDEX DOCD: CPT | Mod: CPTII,S$GLB,, | Performed by: PHYSICIAN ASSISTANT

## 2019-03-25 PROCEDURE — 99999 PR PBB SHADOW E&M-EST. PATIENT-LVL IV: CPT | Mod: PBBFAC,,, | Performed by: PHYSICIAN ASSISTANT

## 2019-03-25 PROCEDURE — 3078F DIAST BP <80 MM HG: CPT | Mod: CPTII,S$GLB,, | Performed by: PHYSICIAN ASSISTANT

## 2019-03-25 PROCEDURE — 3078F PR MOST RECENT DIASTOLIC BLOOD PRESSURE < 80 MM HG: ICD-10-PCS | Mod: CPTII,S$GLB,, | Performed by: PHYSICIAN ASSISTANT

## 2019-03-25 PROCEDURE — 3008F PR BODY MASS INDEX (BMI) DOCUMENTED: ICD-10-PCS | Mod: CPTII,S$GLB,, | Performed by: PHYSICIAN ASSISTANT

## 2019-03-25 PROCEDURE — 99213 PR OFFICE/OUTPT VISIT, EST, LEVL III, 20-29 MIN: ICD-10-PCS | Mod: S$GLB,,, | Performed by: PHYSICIAN ASSISTANT

## 2019-03-25 RX ORDER — MUPIROCIN 20 MG/G
OINTMENT TOPICAL 3 TIMES DAILY
Qty: 30 G | Refills: 11 | Status: SHIPPED | OUTPATIENT
Start: 2019-03-25 | End: 2019-04-17

## 2019-03-25 RX ORDER — PROMETHAZINE HYDROCHLORIDE AND DEXTROMETHORPHAN HYDROBROMIDE 6.25; 15 MG/5ML; MG/5ML
5 SYRUP ORAL EVERY 6 HOURS PRN
Qty: 240 ML | Refills: 0 | Status: SHIPPED | OUTPATIENT
Start: 2019-03-25 | End: 2019-04-04

## 2019-03-25 NOTE — PROGRESS NOTES
Subjective:       Patient ID: Parth Juarez is a 58 y.o. male.    Chief Complaint: Fever; Cough; Chills; and Generalized Body Aches    Fever    This is a new problem. Episode onset: 3 days ago. The problem occurs constantly. The problem has been unchanged. The maximum temperature noted was 100 to 100.9 F. The temperature was taken using an oral thermometer. Associated symptoms include congestion, coughing, headaches, muscle aches, nausea, a sore throat, vomiting and wheezing. Pertinent negatives include no abdominal pain, chest pain, ear pain, sleepiness or urinary pain. He has tried NSAIDs, acetaminophen, cool baths and fluids for the symptoms. The treatment provided moderate relief.   Risk factors: recent travel and sick contacts      Review of Systems   Constitutional: Positive for fever. Negative for activity change, appetite change and fatigue.   HENT: Positive for congestion, postnasal drip, rhinorrhea, sinus pressure and sore throat. Negative for ear discharge, ear pain, facial swelling, hearing loss, mouth sores, nosebleeds, tinnitus and trouble swallowing.    Eyes: Negative for discharge, redness and visual disturbance.   Respiratory: Positive for cough and wheezing. Negative for chest tightness and shortness of breath.    Cardiovascular: Negative for chest pain, palpitations and leg swelling.   Gastrointestinal: Positive for nausea and vomiting. Negative for abdominal pain.   Genitourinary: Negative for dysuria.   Musculoskeletal: Negative for neck stiffness.   Neurological: Positive for headaches.       Objective:      Physical Exam   Constitutional: He appears well-developed and well-nourished. No distress.   HENT:   Head: Normocephalic and atraumatic.   Right Ear: External ear normal.   Left Ear: External ear normal.   Mouth/Throat: Uvula is midline and mucous membranes are normal. No uvula swelling. No oropharyngeal exudate, posterior oropharyngeal edema, posterior oropharyngeal erythema or tonsillar  abscesses.   Eyes: Pupils are equal, round, and reactive to light. Conjunctivae and EOM are normal. Right eye exhibits no discharge. Left eye exhibits no discharge.   Neck: Normal range of motion. Neck supple. No thyromegaly present.   Cardiovascular: Normal rate, regular rhythm and normal heart sounds. Exam reveals no gallop and no friction rub.   No murmur heard.  Pulmonary/Chest: Effort normal and breath sounds normal. No respiratory distress. He has no wheezes. He has no rales.   Abdominal: Soft. Bowel sounds are normal. There is no tenderness.   Lymphadenopathy:     He has no cervical adenopathy.   Skin: He is not diaphoretic.       Assessment:       1. Fever, unspecified fever cause    2. Abrasion of skin        Plan:     Parth was seen today for fever, cough, chills and generalized body aches.    Diagnoses and all orders for this visit:    Fever, unspecified fever cause  -     Influenza A & B by Molecular  -     promethazine-dextromethorphan (PROMETHAZINE-DM) 6.25-15 mg/5 mL Syrp; Take 5 mLs by mouth every 6 (six) hours as needed.    Abrasion of skin  -     mupirocin (BACTROBAN) 2 % ointment; Apply topically 3 (three) times daily.    Will call with results.

## 2019-03-26 ENCOUNTER — PATIENT OUTREACH (OUTPATIENT)
Dept: OTHER | Facility: OTHER | Age: 59
End: 2019-03-26

## 2019-03-26 RX ORDER — OSELTAMIVIR PHOSPHATE 75 MG/1
75 CAPSULE ORAL 2 TIMES DAILY
Qty: 10 CAPSULE | Refills: 0 | Status: SHIPPED | OUTPATIENT
Start: 2019-03-26 | End: 2019-03-31

## 2019-03-26 NOTE — PROGRESS NOTES
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  Current BP average 115/71 mmHg is at goal, <130/80.

## 2019-03-27 ENCOUNTER — TELEPHONE (OUTPATIENT)
Dept: FAMILY MEDICINE | Facility: CLINIC | Age: 59
End: 2019-03-27

## 2019-03-27 NOTE — TELEPHONE ENCOUNTER
Pt stated this is an old message please disregard              ----- Message from Nicky Tsang sent at 3/27/2019  6:15 AM CDT -----  Contact: self  Message from [employer], System Message sent at 3/25/2019  6:48 AM CDT -----    Appointment Request From: Parth Juarez    With Provider: Estephania    Preferred Date Range: Any date 3/25/2019 or later    Preferred Times: Any time    Reason for visit: Bronchitis    Comments:  Chest cold

## 2019-04-09 ENCOUNTER — PATIENT OUTREACH (OUTPATIENT)
Dept: OTHER | Facility: OTHER | Age: 59
End: 2019-04-09

## 2019-04-09 NOTE — PROGRESS NOTES
Last 5 Patient Entered Readings                                      Current 30 Day Average: 122/79     Recent Readings 4/9/2019 4/9/2019 4/9/2019 4/8/2019 4/4/2019    SBP (mmHg) 104 125 121 124 118    DBP (mmHg) 49 78 86 80 77    Pulse 67 67 67 66 68        Hypertension Medications             losartan-hydrochlorothiazide 100-25 mg (HYZAAR) 100-25 mg per tablet TAKE ONE TABLET BY MOUTH IN THE MORNING    metoprolol succinate (TOPROL-XL) 25 MG 24 hr tablet Take 1 tablet (25 mg total) by mouth once daily.        Plan:   Called patient to follow up, reviewed BP readings. Per 2017 ACC/ AHA HTN guidelines  (goal of BP < 130/80), current 30-day average is well controlled.  LVM, requested patient call back at his convenience, would like to discuss if patient is feeling hypotensive.  Will continue to monitor. WCB in 1 month.

## 2019-04-10 NOTE — PROGRESS NOTES
HPI:  Patient called back. Patient reports adherence to medication regimen daily and denies missed doses. Patient denies hypotensive s/sx (lightheadedness, dizziness, nausea, fatigue); patient denies hypertensive s/sx (SOB, CP, severe headaches, changes in vision, dizziness, fatigue, confusion, anxiety, nosebleeds).     Last 5 Patient Entered Readings                                      Current 30 Day Average: 122/79     Recent Readings 4/9/2019 4/9/2019 4/9/2019 4/8/2019 4/4/2019    SBP (mmHg) 104 125 121 124 118    DBP (mmHg) 49 78 86 80 77    Pulse 67 67 67 66 68        Assessment:  Reviewed recent readings. Per 2017 ACC/ AHA HTN guidelines (goal of BP < 130/80), current 30-day average is controlled.     Plan:  Continue current medication regimen.   Patients health , Yeni Lopez, will be following up as scheduled.   I will continue to monitor regularly and will follow-up in 12 weeks, sooner if blood pressure begins to trend upward or downward.     Current medication regimen:  Hypertension Medications             losartan-hydrochlorothiazide 100-25 mg (HYZAAR) 100-25 mg per tablet TAKE ONE TABLET BY MOUTH IN THE MORNING    metoprolol succinate (TOPROL-XL) 25 MG 24 hr tablet Take 1 tablet (25 mg total) by mouth once daily.         Patient denies having questions or concerns. Patient has my contact information and knows to call with any concerns or clinical changes. Instructed patient to call if BP remains > 130/80.

## 2019-04-17 ENCOUNTER — OFFICE VISIT (OUTPATIENT)
Dept: PSYCHIATRY | Facility: CLINIC | Age: 59
End: 2019-04-17
Payer: COMMERCIAL

## 2019-04-17 VITALS
BODY MASS INDEX: 28.93 KG/M2 | HEART RATE: 60 BPM | DIASTOLIC BLOOD PRESSURE: 72 MMHG | WEIGHT: 184.31 LBS | SYSTOLIC BLOOD PRESSURE: 116 MMHG | HEIGHT: 67 IN

## 2019-04-17 DIAGNOSIS — F90.2 ADHD (ATTENTION DEFICIT HYPERACTIVITY DISORDER), COMBINED TYPE: ICD-10-CM

## 2019-04-17 DIAGNOSIS — F41.9 ANXIETY: ICD-10-CM

## 2019-04-17 PROCEDURE — 3008F PR BODY MASS INDEX (BMI) DOCUMENTED: ICD-10-PCS | Mod: CPTII,S$GLB,, | Performed by: PSYCHIATRY & NEUROLOGY

## 2019-04-17 PROCEDURE — 3078F PR MOST RECENT DIASTOLIC BLOOD PRESSURE < 80 MM HG: ICD-10-PCS | Mod: CPTII,S$GLB,, | Performed by: PSYCHIATRY & NEUROLOGY

## 2019-04-17 PROCEDURE — 99213 PR OFFICE/OUTPT VISIT, EST, LEVL III, 20-29 MIN: ICD-10-PCS | Mod: S$GLB,,, | Performed by: PSYCHIATRY & NEUROLOGY

## 2019-04-17 PROCEDURE — 3074F PR MOST RECENT SYSTOLIC BLOOD PRESSURE < 130 MM HG: ICD-10-PCS | Mod: CPTII,S$GLB,, | Performed by: PSYCHIATRY & NEUROLOGY

## 2019-04-17 PROCEDURE — 3078F DIAST BP <80 MM HG: CPT | Mod: CPTII,S$GLB,, | Performed by: PSYCHIATRY & NEUROLOGY

## 2019-04-17 PROCEDURE — 99213 OFFICE O/P EST LOW 20 MIN: CPT | Mod: S$GLB,,, | Performed by: PSYCHIATRY & NEUROLOGY

## 2019-04-17 PROCEDURE — 3074F SYST BP LT 130 MM HG: CPT | Mod: CPTII,S$GLB,, | Performed by: PSYCHIATRY & NEUROLOGY

## 2019-04-17 PROCEDURE — 99999 PR PBB SHADOW E&M-EST. PATIENT-LVL III: CPT | Mod: PBBFAC,,, | Performed by: PSYCHIATRY & NEUROLOGY

## 2019-04-17 PROCEDURE — 3008F BODY MASS INDEX DOCD: CPT | Mod: CPTII,S$GLB,, | Performed by: PSYCHIATRY & NEUROLOGY

## 2019-04-17 PROCEDURE — 99999 PR PBB SHADOW E&M-EST. PATIENT-LVL III: ICD-10-PCS | Mod: PBBFAC,,, | Performed by: PSYCHIATRY & NEUROLOGY

## 2019-04-17 RX ORDER — BUPROPION HYDROCHLORIDE 150 MG/1
150 TABLET ORAL EVERY MORNING
Qty: 90 TABLET | Refills: 1 | Status: SHIPPED | OUTPATIENT
Start: 2019-04-17 | End: 2019-10-16 | Stop reason: SDUPTHER

## 2019-04-17 RX ORDER — CITALOPRAM 20 MG/1
20 TABLET, FILM COATED ORAL DAILY
Qty: 90 TABLET | Refills: 1 | Status: SHIPPED | OUTPATIENT
Start: 2019-04-17 | End: 2019-10-16 | Stop reason: SDUPTHER
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
## 2019-04-22 ENCOUNTER — TELEPHONE (OUTPATIENT)
Dept: FAMILY MEDICINE | Facility: CLINIC | Age: 59
End: 2019-04-22

## 2019-04-22 DIAGNOSIS — M75.52 BURSITIS OF SHOULDER, LEFT: ICD-10-CM

## 2019-04-22 DIAGNOSIS — S83.91XA SPRAIN OF RIGHT KNEE, UNSPECIFIED LIGAMENT, INITIAL ENCOUNTER: Primary | ICD-10-CM

## 2019-04-26 ENCOUNTER — HOSPITAL ENCOUNTER (OUTPATIENT)
Dept: RADIOLOGY | Facility: CLINIC | Age: 59
Discharge: HOME | End: 2019-04-26
Attending: ORTHOPAEDIC SURGERY
Payer: COMMERCIAL

## 2019-04-26 DIAGNOSIS — M25.512 LEFT SHOULDER PAIN, UNSPECIFIED CHRONICITY: ICD-10-CM

## 2019-04-26 DIAGNOSIS — M25.561 RIGHT KNEE PAIN, UNSPECIFIED CHRONICITY: Primary | ICD-10-CM

## 2019-04-26 DIAGNOSIS — M25.512 LEFT SHOULDER PAIN, UNSPECIFIED CHRONICITY: Primary | ICD-10-CM

## 2019-04-26 DIAGNOSIS — M25.561 RIGHT KNEE PAIN, UNSPECIFIED CHRONICITY: ICD-10-CM

## 2019-04-26 PROCEDURE — 73030 XR SHOULDER TRAUMA 3 VIEW LEFT: ICD-10-PCS | Mod: 26,LT,S$GLB, | Performed by: RADIOLOGY

## 2019-04-26 PROCEDURE — 73562 XR KNEE ORTHO RIGHT WITH FLEXION: ICD-10-PCS | Mod: 26,59,RT,S$GLB | Performed by: RADIOLOGY

## 2019-04-26 PROCEDURE — 73030 X-RAY EXAM OF SHOULDER: CPT | Mod: 26,LT,S$GLB, | Performed by: RADIOLOGY

## 2019-04-26 PROCEDURE — 73564 XR KNEE ORTHO RIGHT WITH FLEXION: ICD-10-PCS | Mod: 26,RT,S$GLB, | Performed by: RADIOLOGY

## 2019-04-26 PROCEDURE — 73562 X-RAY EXAM OF KNEE 3: CPT | Mod: 26,59,RT,S$GLB | Performed by: RADIOLOGY

## 2019-04-26 PROCEDURE — 73030 X-RAY EXAM OF SHOULDER: CPT | Mod: TC,FY,PO,LT

## 2019-04-26 PROCEDURE — 73564 X-RAY EXAM KNEE 4 OR MORE: CPT | Mod: 26,RT,S$GLB, | Performed by: RADIOLOGY

## 2019-04-26 PROCEDURE — 73562 X-RAY EXAM OF KNEE 3: CPT | Mod: TC,FY,PO,RT

## 2019-05-02 ENCOUNTER — OFFICE VISIT (OUTPATIENT)
Dept: ORTHOPEDICS | Facility: CLINIC | Age: 59
End: 2019-05-02
Payer: COMMERCIAL

## 2019-05-02 VITALS
HEART RATE: 63 BPM | BODY MASS INDEX: 28.93 KG/M2 | SYSTOLIC BLOOD PRESSURE: 137 MMHG | WEIGHT: 184.31 LBS | HEIGHT: 67 IN | DIASTOLIC BLOOD PRESSURE: 80 MMHG

## 2019-05-02 DIAGNOSIS — M25.512 CHRONIC LEFT SHOULDER PAIN: Primary | ICD-10-CM

## 2019-05-02 DIAGNOSIS — G89.29 CHRONIC LEFT SHOULDER PAIN: Primary | ICD-10-CM

## 2019-05-02 DIAGNOSIS — M17.11 LOCALIZED OSTEOARTHRITIS OF RIGHT KNEE: ICD-10-CM

## 2019-05-02 PROCEDURE — 3075F SYST BP GE 130 - 139MM HG: CPT | Mod: CPTII,S$GLB,, | Performed by: ORTHOPAEDIC SURGERY

## 2019-05-02 PROCEDURE — 99999 PR PBB SHADOW E&M-EST. PATIENT-LVL III: ICD-10-PCS | Mod: PBBFAC,,, | Performed by: ORTHOPAEDIC SURGERY

## 2019-05-02 PROCEDURE — 3079F DIAST BP 80-89 MM HG: CPT | Mod: CPTII,S$GLB,, | Performed by: ORTHOPAEDIC SURGERY

## 2019-05-02 PROCEDURE — 3008F PR BODY MASS INDEX (BMI) DOCUMENTED: ICD-10-PCS | Mod: CPTII,S$GLB,, | Performed by: ORTHOPAEDIC SURGERY

## 2019-05-02 PROCEDURE — 99999 PR PBB SHADOW E&M-EST. PATIENT-LVL III: CPT | Mod: PBBFAC,,, | Performed by: ORTHOPAEDIC SURGERY

## 2019-05-02 PROCEDURE — 3075F PR MOST RECENT SYSTOLIC BLOOD PRESS GE 130-139MM HG: ICD-10-PCS | Mod: CPTII,S$GLB,, | Performed by: ORTHOPAEDIC SURGERY

## 2019-05-02 PROCEDURE — 3079F PR MOST RECENT DIASTOLIC BLOOD PRESSURE 80-89 MM HG: ICD-10-PCS | Mod: CPTII,S$GLB,, | Performed by: ORTHOPAEDIC SURGERY

## 2019-05-02 PROCEDURE — 3008F BODY MASS INDEX DOCD: CPT | Mod: CPTII,S$GLB,, | Performed by: ORTHOPAEDIC SURGERY

## 2019-05-02 PROCEDURE — 99204 PR OFFICE/OUTPT VISIT, NEW, LEVL IV, 45-59 MIN: ICD-10-PCS | Mod: S$GLB,,, | Performed by: ORTHOPAEDIC SURGERY

## 2019-05-02 PROCEDURE — 99204 OFFICE O/P NEW MOD 45 MIN: CPT | Mod: S$GLB,,, | Performed by: ORTHOPAEDIC SURGERY

## 2019-05-02 NOTE — LETTER
May 2, 2019      Anay Escalona, RYAN  [address] [facility] [address]           [address] [address]  Phone: [phone]          Patient: Parth Juarez   MR Number: [id number]   YOB: 1960   Date of Visit: 5/2/2019       Dear Anay Escalona:    Thank you for referring Parth Juarez to me for evaluation. Attached you will find relevant portions of my assessment and plan of care.    If you have questions, please do not hesitate to call me. I look forward to following Parth Juarez along with you.    Sincerely,    Lee Duarte II, MD    Enclosure  CC:  No Recipients    If you would like to receive this communication electronically, please contact [email or web address] or [phone] to request more information on [employer] access.    For providers and/or their staff who would like to refer a patient to [facility], please contact us through our one-stop-shop provider referral line, Matthew Arechiga, at [phone].    If you feel you have received this communication in error or would no longer like to receive these types of communications, please e-mail [email or web address]

## 2019-05-02 NOTE — PROGRESS NOTES
59 year old male presents for evaluation of right knee and left shoulder pain. Patient states he has had pain in the left shoulder and right knee for several months.  He states that over [place] he was trying to take down a [place] wreath and he fell about 10 ft off a ladder landing on his left side. He had pain in the left shoulder at that time it has progressively gotten better.  At this point he states he only has pain with specific motions and when showing me he has pain with external rotation and abduction.    With regards to the right knee the patient states he has had insidious onset of pain in the right knee but it is activity related in very specific.  He states when he is working during the day and walking around he does not really have pain it is when he is playing soccer with his kids when he is going to get out of his car he has a specific pivoting motion on the right knee is when he has pain and that pain is located on the lateral aspect of the right knee.    ROS:    Constitution: Denies chills, fever, and sweats.  HENT: Denies headaches or blurry vision.  Cardiovascular: Denies chest pain or irregular heart beat.  Respiratory: Denies cough or shortness of breath.  Gastrointestinal: Denies abdominal pain, nausea, or vomiting.  Genitourinary:  Denies urinary incontinence, bladder and kidney issues  Musculoskeletal:  Denies muscle cramps.  Right knee and left shoulder pain  Neurological: Denies dizziness or focal weakness.  Psychiatric/Behavioral: Normal mental status.  Hematologic/Lymphatic: Denies bleeding problem or easy bruising/bleeding.  Skin: Denies rash or suspicious lesions.    Physical examination     Gen - No acute distress   Eyes - Extraoccular motions intact, pupils equally round and reactive to light and accommodation   ENT - normocephalic, atruamtic, oropharynx clear   Neck - Supple, no abnormal masses   Cardiovascular - regular rate and rhythm   Pulmonary - clear to auscultation  bilaterally   Abdomen - soft, non-tender, non-distended, positive bowel sounds   Psych - The patient is alert and oriented x3 with normal mood and affect    Left Upper Extremity Examination     Skin is intact throughout   Motor is intact distally radial, median, ulnar, AIN, PIN   +2 radial and ulnar pulses   Sensation to light touch is intact distally radial, median, and ulnar     Examination of the Left shoulder:   ROM:   For - 150   Abd - 150   Ext - 50   Int - T12     Tenderness to palpation:   Subacromial space - negative  Biceps Tendon - negative  Anterior Glenohumeral Joint - negative  AC joint - negative  Glenohumeral instability - negative  Empty Can test - negative  Speeds test - negative  Smith/Neers sign - negative  Cross-arm adduction test - negative    Examination of the Right Lower Extremity:     Motor function is intact distally EHL/FHL/TA/bill   +2 dorsalis pedis and posterior tibial pulses   Sensation to light touch intact distally dorsal, plantar, and first web space     Examination of the Right knee:    ROM 0 - 150   Effusion negative  Tenderness to palpation at the joint line positive at the lateral joint line  Pain during range of motion negative  Crepitation during range of motion negative     negative increased pain noted with flexion past 90   negative antalgic gait noted   negative Lachman's Test   negative Anterior Drawer Test   negative Posterior Drawer Test   negative McMurrays Test   negative Disco Test   negative Varus/Valgus instability    X-rays were examined and personally reviewed by me.  Five views of the left shoulder show the humeral head is well reduced in the glenoid.  There are no acute fractures.  There is some mild arthritic change in the left glenohumeral joint with some narrowing of the joint space but otherwise a normal shoulder exam.  Three views of the right knee were examined and there is osteoarthritis of the right knee involving mostly the lateral compartment with  narrowing of the joint space of the lateral compartment, early periarticular osteophytes laterally, and some early subchondral sclerosis.  There are no acute fractures in this is an otherwise normal exam.    Dx:  Rotator cuff tendinitis of the left shoulder with some mild osteoarthritic changes of the glenohumeral joint and mild osteoarthritis of the right knee mostly confined to the lateral compartment    Plan:  We discussed treatment options.  The patient is not interested in injections at this time. We gave him a sample of Duexis (ibuprofen and famotidine) and he will call us if he would like a prescription for that.  Otherwise his activity as tolerated and follow up p.r.n.    Answers for HPI/ROS submitted by the patient on 4/26/2019   Leg pain  unexpected weight change: No  appetite change : No  sleep disturbance: No  IMMUNOCOMPROMISED: No  nervous/ anxious: No  dysphoric mood: No  rash: No  visual disturbance: No  eye redness: No  eye pain: No  ear pain: No  tinnitus: No  hearing loss: No  sinus pressure : No  nosebleeds: No  enviro allergies: Yes  food allergies: No  cough: No  shortness of breath: No  sweating: No  frequency: No  difficulty urinating: No  hematuria: No  chest pain: No  palpitations: No  nausea: No  vomiting: No  diarrhea: No  blood in stool: No  constipation: No  headaches: No  dizziness: No  numbness: No  seizures: No  joint swelling: No  myalgia: Yes  weakness: No  back pain: No  Pain Chronicity: recurrent  History of trauma: Yes  Onset: 1 to 4 weeks ago  Frequency: intermittently  Progression since onset: gradually improving  Injury mechanism: twisting  injury location: on the field  pain- numeric: 2/10  pain location: left shoulder, right knee  pain quality: tightness  Radiating Pain: No  Aggravating factors: rotation  fever: No  inability to bear weight: No  itching: No  joint locking: Yes  limited range of motion: No  stiffness: Yes  tingling: No  Treatments tried: nothing  physical  therapy: not tried  Improvement on treatment: mild

## 2019-05-14 ENCOUNTER — PATIENT OUTREACH (OUTPATIENT)
Dept: OTHER | Facility: OTHER | Age: 59
End: 2019-05-14

## 2019-05-14 NOTE — PROGRESS NOTES
Last 5 Patient Entered Readings                                      Current 30 Day Average: 126/83     Recent Readings 5/13/2019 5/13/2019 5/13/2019 5/8/2019 5/8/2019    SBP (mmHg) 132 135 141 122 121    DBP (mmHg) 80 84 92 83 79    Pulse 62 59 63 56 56            Digital Medicine: Health  Follow Up    Sent [facility] Mobile message to follow up with Dr. Parth Juarez.  Current BP average 126/83 mmHg is borderline at goal, <130/80.

## 2019-06-03 ENCOUNTER — TELEPHONE (OUTPATIENT)
Dept: FAMILY MEDICINE | Facility: CLINIC | Age: 59
End: 2019-06-03

## 2019-06-03 DIAGNOSIS — Z23 ENCOUNTER FOR ADMINISTRATION OF VACCINE: Primary | ICD-10-CM

## 2019-06-05 ENCOUNTER — PATIENT MESSAGE (OUTPATIENT)
Dept: FAMILY MEDICINE | Facility: CLINIC | Age: 59
End: 2019-06-05

## 2019-06-10 ENCOUNTER — CLINICAL SUPPORT (OUTPATIENT)
Dept: FAMILY MEDICINE | Facility: CLINIC | Age: 59
End: 2019-06-10
Payer: COMMERCIAL

## 2019-06-10 DIAGNOSIS — Z23 ENCOUNTER FOR ADMINISTRATION OF VACCINE: ICD-10-CM

## 2019-06-10 PROCEDURE — 90750 HZV VACC RECOMBINANT IM: CPT | Mod: S$GLB,,, | Performed by: NURSE PRACTITIONER

## 2019-06-10 PROCEDURE — 99999 PR PBB SHADOW E&M-EST. PATIENT-LVL I: ICD-10-PCS | Mod: PBBFAC,,,

## 2019-06-10 PROCEDURE — 90750 ZOSTER RECOMBINANT VACCINE: ICD-10-PCS | Mod: S$GLB,,, | Performed by: NURSE PRACTITIONER

## 2019-06-10 PROCEDURE — 90471 ZOSTER RECOMBINANT VACCINE: ICD-10-PCS | Mod: S$GLB,,, | Performed by: NURSE PRACTITIONER

## 2019-06-10 PROCEDURE — 90471 IMMUNIZATION ADMIN: CPT | Mod: S$GLB,,, | Performed by: NURSE PRACTITIONER

## 2019-06-10 PROCEDURE — 99999 PR PBB SHADOW E&M-EST. PATIENT-LVL I: CPT | Mod: PBBFAC,,,

## 2019-06-10 NOTE — PROGRESS NOTES
Patient verified by name and . Patient received Shingrix 1 of 2 in right deltoid. Patient tolerated injection well. Patient advised to wait in clinic for 15 minutes in case of adverse reactions. Patient demonstrated understanding.

## 2019-06-14 ENCOUNTER — PATIENT OUTREACH (OUTPATIENT)
Dept: ADMINISTRATIVE | Facility: HOSPITAL | Age: 59
End: 2019-06-14

## 2019-06-17 ENCOUNTER — PATIENT MESSAGE (OUTPATIENT)
Dept: FAMILY MEDICINE | Facility: CLINIC | Age: 59
End: 2019-06-17

## 2019-06-18 ENCOUNTER — PATIENT OUTREACH (OUTPATIENT)
Dept: OTHER | Facility: OTHER | Age: 59
End: 2019-06-18

## 2019-06-18 NOTE — PROGRESS NOTES
Last 5 Patient Entered Readings                                      Current 30 Day Average: 120/79     Recent Readings 6/17/2019 6/12/2019 6/10/2019 6/10/2019 6/10/2019    SBP (mmHg) 115 125 122 124 124    DBP (mmHg) 80 81 76 66 90    Pulse 58 63 54 56 55        Hypertension Medications             losartan-hydrochlorothiazide 100-25 mg (HYZAAR) 100-25 mg per tablet TAKE ONE TABLET BY MOUTH IN THE MORNING    metoprolol succinate (TOPROL-XL) 25 MG 24 hr tablet Take 1 tablet (25 mg total) by mouth once daily.        Called patient to follow up, reviewed BP readings. Per 2017 ACC/ AHA HTN guidelines  (goal of BP < 130/80), current 30-day average is controlled.  Patient is unable to speak, currently seeing patients. Patient is scheduled to see PCP on 6/28.  Will continue to monitor. WCB in 6 months, sooner if BP begins to trend up or down.

## 2019-06-28 ENCOUNTER — OFFICE VISIT (OUTPATIENT)
Dept: FAMILY MEDICINE | Facility: CLINIC | Age: 59
End: 2019-06-28
Payer: COMMERCIAL

## 2019-06-28 VITALS
HEIGHT: 67 IN | HEART RATE: 73 BPM | DIASTOLIC BLOOD PRESSURE: 85 MMHG | WEIGHT: 185.19 LBS | BODY MASS INDEX: 29.07 KG/M2 | TEMPERATURE: 99 F | SYSTOLIC BLOOD PRESSURE: 128 MMHG

## 2019-06-28 DIAGNOSIS — R73.9 HYPERGLYCEMIA: ICD-10-CM

## 2019-06-28 DIAGNOSIS — I10 GOOD HYPERTENSION CONTROL: Primary | ICD-10-CM

## 2019-06-28 DIAGNOSIS — D50.9 IRON DEFICIENCY ANEMIA, UNSPECIFIED IRON DEFICIENCY ANEMIA TYPE: ICD-10-CM

## 2019-06-28 DIAGNOSIS — E78.5 HYPERLIPIDEMIA, UNSPECIFIED HYPERLIPIDEMIA TYPE: ICD-10-CM

## 2019-06-28 DIAGNOSIS — F41.9 CHRONIC ANXIETY: ICD-10-CM

## 2019-06-28 DIAGNOSIS — R80.9 POSITIVE FOR MICROALBUMINURIA: ICD-10-CM

## 2019-06-28 PROCEDURE — 3079F DIAST BP 80-89 MM HG: CPT | Mod: CPTII,S$GLB,, | Performed by: FAMILY MEDICINE

## 2019-06-28 PROCEDURE — 3079F PR MOST RECENT DIASTOLIC BLOOD PRESSURE 80-89 MM HG: ICD-10-PCS | Mod: CPTII,S$GLB,, | Performed by: FAMILY MEDICINE

## 2019-06-28 PROCEDURE — 99999 PR PBB SHADOW E&M-EST. PATIENT-LVL III: ICD-10-PCS | Mod: PBBFAC,,, | Performed by: FAMILY MEDICINE

## 2019-06-28 PROCEDURE — 99999 PR PBB SHADOW E&M-EST. PATIENT-LVL III: CPT | Mod: PBBFAC,,, | Performed by: FAMILY MEDICINE

## 2019-06-28 PROCEDURE — 99214 PR OFFICE/OUTPT VISIT, EST, LEVL IV, 30-39 MIN: ICD-10-PCS | Mod: S$GLB,,, | Performed by: FAMILY MEDICINE

## 2019-06-28 PROCEDURE — 3074F PR MOST RECENT SYSTOLIC BLOOD PRESSURE < 130 MM HG: ICD-10-PCS | Mod: CPTII,S$GLB,, | Performed by: FAMILY MEDICINE

## 2019-06-28 PROCEDURE — 99214 OFFICE O/P EST MOD 30 MIN: CPT | Mod: S$GLB,,, | Performed by: FAMILY MEDICINE

## 2019-06-28 PROCEDURE — 3008F BODY MASS INDEX DOCD: CPT | Mod: CPTII,S$GLB,, | Performed by: FAMILY MEDICINE

## 2019-06-28 PROCEDURE — 3008F PR BODY MASS INDEX (BMI) DOCUMENTED: ICD-10-PCS | Mod: CPTII,S$GLB,, | Performed by: FAMILY MEDICINE

## 2019-06-28 PROCEDURE — 3074F SYST BP LT 130 MM HG: CPT | Mod: CPTII,S$GLB,, | Performed by: FAMILY MEDICINE

## 2019-06-28 NOTE — PROGRESS NOTES
Subjective:       Patient ID: Parth Juarez is a 59 y.o. male.    Chief Complaint: Annual Exam    HPI  Review of Systems   Constitutional: Negative for fatigue and unexpected weight change.   Respiratory: Negative for chest tightness and shortness of breath.    Cardiovascular: Negative for chest pain, palpitations and leg swelling.   Gastrointestinal: Negative for abdominal pain.   Musculoskeletal: Negative for arthralgias and neck pain.   Neurological: Negative for dizziness, syncope, light-headedness and headaches.       Patient Active Problem List   Diagnosis    Hyperlipidemia    Good hypertension control    Exercise-induced asthma    Positive for microalbuminuria    Chronic anxiety    Hyperglycemia    Attention deficit disorder    Anemia    NICCI (iron deficiency anemia)    ADHD (attention deficit hyperactivity disorder), combined type    Anxiety     Patient is here for a chronic conditions follow up.      Objective:      Physical Exam   Constitutional: He is oriented to person, place, and time. He appears well-developed and well-nourished.   Cardiovascular: Normal rate, regular rhythm and normal heart sounds.   Pulmonary/Chest: Effort normal and breath sounds normal.   Musculoskeletal: He exhibits no edema.   Neurological: He is alert and oriented to person, place, and time.   Skin: Skin is warm and dry.   Psychiatric: He has a normal mood and affect.   Nursing note and vitals reviewed.      Assessment:       1. Good hypertension control    2. Hyperlipidemia, unspecified hyperlipidemia type    3. Iron deficiency anemia, unspecified iron deficiency anemia type    4. Positive for microalbuminuria    5. Hyperglycemia    6. Chronic anxiety        Plan:         1. Good hypertension control  Controlled on current medications.  Continue current medications.    - CBC auto differential; Future    2. Hyperlipidemia, unspecified hyperlipidemia type  Stable condition.  Continue current medications.  Will adjust  based on lab findings or if condition changes.    - Comprehensive metabolic panel; Future  - Lipid panel; Future    3. Iron deficiency anemia, unspecified iron deficiency anemia type  Iron normalized. Cont monitoring. No longer donating blood    4. Positive for microalbuminuria  Screen and treat as indicated:    - Microalbumin/creatinine urine ratio; Future    5. Hyperglycemia   Your blood sugar is borderline high.  This means you are at risk for developing type 2 diabetes mellitus.  To lessen your risk you should exercise regularly, avoid excess carbohydrates and work toward a body mass index of less than 25.      - Hemoglobin A1c; Future    6. Chronic anxiety  Controlled with current meds        Time spent with patient: 20 minutes    Patient with be reevaluated in 6 months or sooner prn    Greater than 50% of this visit was spent counseling as described in above documentation:Yes

## 2019-06-29 ENCOUNTER — LAB VISIT (OUTPATIENT)
Dept: LAB | Facility: HOSPITAL | Age: 59
End: 2019-06-29
Attending: FAMILY MEDICINE
Payer: COMMERCIAL

## 2019-06-29 DIAGNOSIS — I10 GOOD HYPERTENSION CONTROL: ICD-10-CM

## 2019-06-29 DIAGNOSIS — E78.5 HYPERLIPIDEMIA, UNSPECIFIED HYPERLIPIDEMIA TYPE: ICD-10-CM

## 2019-06-29 DIAGNOSIS — R73.9 HYPERGLYCEMIA: ICD-10-CM

## 2019-06-29 LAB
ALBUMIN SERPL BCP-MCNC: 3.9 G/DL (ref 3.5–5.2)
ALP SERPL-CCNC: 67 U/L (ref 55–135)
ALT SERPL W/O P-5'-P-CCNC: 38 U/L (ref 10–44)
ANION GAP SERPL CALC-SCNC: 10 MMOL/L (ref 8–16)
AST SERPL-CCNC: 27 U/L (ref 10–40)
BASOPHILS # BLD AUTO: 0.03 K/UL (ref 0–0.2)
BASOPHILS NFR BLD: 0.6 % (ref 0–1.9)
BILIRUB SERPL-MCNC: 0.4 MG/DL (ref 0.1–1)
BUN SERPL-MCNC: 24 MG/DL (ref 8–23)
CALCIUM SERPL-MCNC: 10 MG/DL (ref 8.7–10.5)
CHLORIDE SERPL-SCNC: 103 MMOL/L (ref 95–110)
CHOLEST SERPL-MCNC: 274 MG/DL (ref 120–199)
CHOLEST/HDLC SERPL: 5 {RATIO} (ref 2–5)
CO2 SERPL-SCNC: 26 MMOL/L (ref 23–29)
CREAT SERPL-MCNC: 1.1 MG/DL (ref 0.5–1.4)
DIFFERENTIAL METHOD: ABNORMAL
EOSINOPHIL # BLD AUTO: 0.2 K/UL (ref 0–0.5)
EOSINOPHIL NFR BLD: 4.2 % (ref 0–8)
ERYTHROCYTE [DISTWIDTH] IN BLOOD BY AUTOMATED COUNT: 12.5 % (ref 11.5–14.5)
EST. GFR  (AFRICAN AMERICAN): >60 ML/MIN/1.73 M^2
EST. GFR  (NON AFRICAN AMERICAN): >60 ML/MIN/1.73 M^2
ESTIMATED AVG GLUCOSE: 111 MG/DL (ref 68–131)
GLUCOSE SERPL-MCNC: 85 MG/DL (ref 70–110)
HBA1C MFR BLD HPLC: 5.5 % (ref 4–5.6)
HCT VFR BLD AUTO: 44.2 % (ref 40–54)
HDLC SERPL-MCNC: 55 MG/DL (ref 40–75)
HDLC SERPL: 20.1 % (ref 20–50)
HGB BLD-MCNC: 14.4 G/DL (ref 14–18)
IMM GRANULOCYTES # BLD AUTO: 0.01 K/UL (ref 0–0.04)
IMM GRANULOCYTES NFR BLD AUTO: 0.2 % (ref 0–0.5)
LDLC SERPL CALC-MCNC: 195.2 MG/DL (ref 63–159)
LYMPHOCYTES # BLD AUTO: 1.6 K/UL (ref 1–4.8)
LYMPHOCYTES NFR BLD: 32.4 % (ref 18–48)
MCH RBC QN AUTO: 31.2 PG (ref 27–31)
MCHC RBC AUTO-ENTMCNC: 32.6 G/DL (ref 32–36)
MCV RBC AUTO: 96 FL (ref 82–98)
MONOCYTES # BLD AUTO: 0.5 K/UL (ref 0.3–1)
MONOCYTES NFR BLD: 10.7 % (ref 4–15)
NEUTROPHILS # BLD AUTO: 2.5 K/UL (ref 1.8–7.7)
NEUTROPHILS NFR BLD: 51.9 % (ref 38–73)
NONHDLC SERPL-MCNC: 219 MG/DL
NRBC BLD-RTO: 0 /100 WBC
PLATELET # BLD AUTO: 190 K/UL (ref 150–350)
PMV BLD AUTO: 11.4 FL (ref 9.2–12.9)
POTASSIUM SERPL-SCNC: 4.4 MMOL/L (ref 3.5–5.1)
PROT SERPL-MCNC: 7.4 G/DL (ref 6–8.4)
RBC # BLD AUTO: 4.62 M/UL (ref 4.6–6.2)
SODIUM SERPL-SCNC: 139 MMOL/L (ref 136–145)
TRIGL SERPL-MCNC: 119 MG/DL (ref 30–150)
WBC # BLD AUTO: 4.78 K/UL (ref 3.9–12.7)

## 2019-06-29 PROCEDURE — 85025 COMPLETE CBC W/AUTO DIFF WBC: CPT

## 2019-06-29 PROCEDURE — 83036 HEMOGLOBIN GLYCOSYLATED A1C: CPT

## 2019-06-29 PROCEDURE — 80061 LIPID PANEL: CPT

## 2019-06-29 PROCEDURE — 36415 COLL VENOUS BLD VENIPUNCTURE: CPT | Mod: PO

## 2019-06-29 PROCEDURE — 80053 COMPREHEN METABOLIC PANEL: CPT

## 2019-06-30 ENCOUNTER — PATIENT MESSAGE (OUTPATIENT)
Dept: ADMINISTRATIVE | Facility: OTHER | Age: 59
End: 2019-06-30

## 2019-08-02 DIAGNOSIS — I10 HYPERTENSION, UNSPECIFIED TYPE: ICD-10-CM

## 2019-08-02 RX ORDER — METOPROLOL SUCCINATE 25 MG/1
TABLET, EXTENDED RELEASE ORAL
Qty: 90 TABLET | Refills: 3 | Status: SHIPPED | OUTPATIENT
Start: 2019-08-02 | End: 2020-07-11

## 2019-08-09 ENCOUNTER — CLINICAL SUPPORT (OUTPATIENT)
Dept: FAMILY MEDICINE | Facility: CLINIC | Age: 59
End: 2019-08-09
Payer: COMMERCIAL

## 2019-08-09 DIAGNOSIS — Z23 ENCOUNTER FOR ADMINISTRATION OF VACCINE: ICD-10-CM

## 2019-08-09 PROCEDURE — 90471 IMMUNIZATION ADMIN: CPT | Mod: S$GLB,,, | Performed by: NURSE PRACTITIONER

## 2019-08-09 PROCEDURE — 90750 ZOSTER RECOMBINANT VACCINE: ICD-10-PCS | Mod: S$GLB,,, | Performed by: NURSE PRACTITIONER

## 2019-08-09 PROCEDURE — 90471 ZOSTER RECOMBINANT VACCINE: ICD-10-PCS | Mod: S$GLB,,, | Performed by: NURSE PRACTITIONER

## 2019-08-09 PROCEDURE — 99999 PR PBB SHADOW E&M-EST. PATIENT-LVL I: ICD-10-PCS | Mod: PBBFAC,,,

## 2019-08-09 PROCEDURE — 99999 PR PBB SHADOW E&M-EST. PATIENT-LVL I: CPT | Mod: PBBFAC,,,

## 2019-08-09 PROCEDURE — 90750 HZV VACC RECOMBINANT IM: CPT | Mod: S$GLB,,, | Performed by: NURSE PRACTITIONER

## 2019-08-09 NOTE — PROGRESS NOTES
Patient verified by name and . Patient received Shingrix 2 of 2 in right deltoid. Patient tolerated injection well. Patient advised to wait in clinic for 15 minutes in case of adverse reactions. Patient demonstrated understanding.

## 2019-09-26 ENCOUNTER — PATIENT OUTREACH (OUTPATIENT)
Dept: OTHER | Facility: OTHER | Age: 59
End: 2019-09-26

## 2019-10-16 ENCOUNTER — OFFICE VISIT (OUTPATIENT)
Dept: PSYCHIATRY | Facility: CLINIC | Age: 59
End: 2019-10-16
Payer: COMMERCIAL

## 2019-10-16 VITALS
HEART RATE: 65 BPM | HEIGHT: 67 IN | SYSTOLIC BLOOD PRESSURE: 140 MMHG | BODY MASS INDEX: 29.61 KG/M2 | WEIGHT: 188.63 LBS | DIASTOLIC BLOOD PRESSURE: 81 MMHG

## 2019-10-16 DIAGNOSIS — F90.2 ADHD (ATTENTION DEFICIT HYPERACTIVITY DISORDER), COMBINED TYPE: ICD-10-CM

## 2019-10-16 DIAGNOSIS — F41.9 ANXIETY: ICD-10-CM

## 2019-10-16 PROBLEM — F98.8 ATTENTION DEFICIT DISORDER: Status: RESOLVED | Noted: 2018-04-02 | Resolved: 2019-10-16

## 2019-10-16 PROCEDURE — 3079F DIAST BP 80-89 MM HG: CPT | Mod: CPTII,S$GLB,, | Performed by: PSYCHIATRY & NEUROLOGY

## 2019-10-16 PROCEDURE — 3077F SYST BP >= 140 MM HG: CPT | Mod: CPTII,S$GLB,, | Performed by: PSYCHIATRY & NEUROLOGY

## 2019-10-16 PROCEDURE — 99213 PR OFFICE/OUTPT VISIT, EST, LEVL III, 20-29 MIN: ICD-10-PCS | Mod: S$GLB,,, | Performed by: PSYCHIATRY & NEUROLOGY

## 2019-10-16 PROCEDURE — 99999 PR PBB SHADOW E&M-EST. PATIENT-LVL III: ICD-10-PCS | Mod: PBBFAC,,, | Performed by: PSYCHIATRY & NEUROLOGY

## 2019-10-16 PROCEDURE — 99213 OFFICE O/P EST LOW 20 MIN: CPT | Mod: S$GLB,,, | Performed by: PSYCHIATRY & NEUROLOGY

## 2019-10-16 PROCEDURE — 3077F PR MOST RECENT SYSTOLIC BLOOD PRESSURE >= 140 MM HG: ICD-10-PCS | Mod: CPTII,S$GLB,, | Performed by: PSYCHIATRY & NEUROLOGY

## 2019-10-16 PROCEDURE — 99999 PR PBB SHADOW E&M-EST. PATIENT-LVL III: CPT | Mod: PBBFAC,,, | Performed by: PSYCHIATRY & NEUROLOGY

## 2019-10-16 PROCEDURE — 3008F PR BODY MASS INDEX (BMI) DOCUMENTED: ICD-10-PCS | Mod: CPTII,S$GLB,, | Performed by: PSYCHIATRY & NEUROLOGY

## 2019-10-16 PROCEDURE — 3008F BODY MASS INDEX DOCD: CPT | Mod: CPTII,S$GLB,, | Performed by: PSYCHIATRY & NEUROLOGY

## 2019-10-16 PROCEDURE — 3079F PR MOST RECENT DIASTOLIC BLOOD PRESSURE 80-89 MM HG: ICD-10-PCS | Mod: CPTII,S$GLB,, | Performed by: PSYCHIATRY & NEUROLOGY

## 2019-10-16 RX ORDER — BUPROPION HYDROCHLORIDE 150 MG/1
150 TABLET ORAL EVERY MORNING
Refills: 5 | COMMUNITY
Start: 2019-09-21 | End: 2019-10-16

## 2019-10-16 RX ORDER — BUPROPION HYDROCHLORIDE 150 MG/1
150 TABLET ORAL EVERY MORNING
Qty: 90 TABLET | Refills: 1 | Status: SHIPPED | OUTPATIENT
Start: 2019-10-16 | End: 2020-04-19 | Stop reason: SDUPTHER

## 2019-10-16 RX ORDER — CITALOPRAM 20 MG/1
20 TABLET, FILM COATED ORAL DAILY
Qty: 90 TABLET | Refills: 1 | Status: SHIPPED | OUTPATIENT
Start: 2019-10-16 | End: 2020-01-22 | Stop reason: SDUPTHER
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
## 2019-10-21 RX ORDER — BUPROPION HYDROCHLORIDE 150 MG/1
150 TABLET ORAL EVERY MORNING
Qty: 90 TABLET | Refills: 1 | Status: CANCELLED | OUTPATIENT
Start: 2019-10-21 | End: 2019-11-20

## 2019-10-28 RX ORDER — HYDROCHLOROTHIAZIDE 25 MG/1
25 TABLET ORAL DAILY
COMMUNITY
End: 2019-10-28 | Stop reason: SDUPTHER

## 2019-10-28 RX ORDER — LOSARTAN POTASSIUM 100 MG/1
100 TABLET ORAL DAILY
COMMUNITY
End: 2019-10-28 | Stop reason: SDUPTHER

## 2019-10-29 ENCOUNTER — TELEPHONE (OUTPATIENT)
Dept: FAMILY MEDICINE | Facility: CLINIC | Age: 59
End: 2019-10-29

## 2019-10-29 DIAGNOSIS — I10 HYPERTENSION, UNSPECIFIED TYPE: Primary | ICD-10-CM

## 2019-10-29 RX ORDER — LOSARTAN POTASSIUM 50 MG/1
100 TABLET ORAL DAILY
Qty: 180 TABLET | Refills: 3 | Status: SHIPPED | OUTPATIENT
Start: 2019-10-29 | End: 2020-01-28

## 2019-10-29 RX ORDER — LOSARTAN POTASSIUM 100 MG/1
100 TABLET ORAL DAILY
Qty: 90 TABLET | Refills: 3 | Status: SHIPPED | OUTPATIENT
Start: 2019-10-29 | End: 2019-10-29

## 2019-10-29 RX ORDER — HYDROCHLOROTHIAZIDE 25 MG/1
25 TABLET ORAL DAILY
Qty: 90 TABLET | Refills: 3 | Status: SHIPPED | OUTPATIENT
Start: 2019-10-29 | End: 2020-11-01

## 2019-11-25 ENCOUNTER — PATIENT OUTREACH (OUTPATIENT)
Dept: OTHER | Facility: OTHER | Age: 59
End: 2019-11-25

## 2019-12-04 ENCOUNTER — PATIENT OUTREACH (OUTPATIENT)
Dept: OTHER | Facility: OTHER | Age: 59
End: 2019-12-04

## 2019-12-04 NOTE — PROGRESS NOTES
Last 5 Patient Entered Readings                                      Current 30 Day Average: 126/82     Recent Readings 11/26/2019 11/13/2019 11/13/2019 11/13/2019 11/12/2019    SBP (mmHg) 119 120 115 138 132    DBP (mmHg) 70 81 79 91 79    Pulse 68 60 60 62 62        Hypertension Medications             hydroCHLOROthiazide (HYDRODIURIL) 25 MG tablet Take 1 tablet (25 mg total) by mouth once daily.    losartan (COZAAR) 50 MG tablet Take 2 tablets (100 mg total) by mouth once daily.    metoprolol succinate (TOPROL-XL) 25 MG 24 hr tablet TAKE 1 TABLET BY MOUTH ONCE DAILY        Called patient to follow up, reviewed BP readings. Per 2017 ACC/ AHA HTN guidelines  (goal of BP < 130/80), current 30-day average is controlled.  LVM, requested patient call back at his convenience if he has any questions or concerns.  Will continue to monitor. WCB in 6 months, sooner if BP begins to trend up or down.

## 2019-12-11 NOTE — PROGRESS NOTES
Digital Medicine: Health  Follow-Up    Brief encounter with patient. Just called to introduce myself and let patient know that I would be new HC going forward.  Patient states that he is doing well and is not having any issues with his cuff. He stated that now that he has cuff with him in his office there are no excuses. Let patient know that I would reach out to him through [employer] [facility]ily going forward but if he ever needed anything then he could call my direct line. Will f/u in 6-8 weeks.    The history is provided by the patient.     Follow Up  Follow-up reason(s): reading review      Readings are trending down due to lifestyle change.        INTERVENTION(S)  encouragement/support      There are no preventive care reminders to display for this patient.    Last 5 Patient Entered Readings                                      Current 30 Day Average: 124/82     Recent Readings 11/26/2019 11/13/2019 11/13/2019 11/13/2019 11/12/2019    SBP (mmHg) 119 120 115 138 132    DBP (mmHg) 70 81 79 91 79    Pulse 68 60 60 62 62                      Diet Screening       Deferred.    Physical Activity Screening       Deferred.    Medication Adherence Screening   He misses doses: never      Patient identified the following reasons for non-compliance: none    Patient reports no s/s or issues taking BP medications as prescribed.      SDOH

## 2020-01-11 ENCOUNTER — PATIENT MESSAGE (OUTPATIENT)
Dept: ADMINISTRATIVE | Facility: OTHER | Age: 60
End: 2020-01-11

## 2020-01-20 ENCOUNTER — PATIENT MESSAGE (OUTPATIENT)
Dept: ADMINISTRATIVE | Facility: OTHER | Age: 60
End: 2020-01-20

## 2020-01-20 ENCOUNTER — PATIENT MESSAGE (OUTPATIENT)
Dept: FAMILY MEDICINE | Facility: CLINIC | Age: 60
End: 2020-01-20

## 2020-01-22 ENCOUNTER — CLINICAL SUPPORT (OUTPATIENT)
Dept: FAMILY MEDICINE | Facility: CLINIC | Age: 60
End: 2020-01-22
Payer: COMMERCIAL

## 2020-01-22 DIAGNOSIS — F41.9 ANXIETY: ICD-10-CM

## 2020-01-22 DIAGNOSIS — R73.9 HYPERGLYCEMIA: ICD-10-CM

## 2020-01-22 DIAGNOSIS — R06.2 WHEEZING: Primary | ICD-10-CM

## 2020-01-22 DIAGNOSIS — E78.5 HYPERLIPIDEMIA, UNSPECIFIED HYPERLIPIDEMIA TYPE: Primary | ICD-10-CM

## 2020-01-22 PROCEDURE — 96372 PR INJECTION,THERAP/PROPH/DIAG2ST, IM OR SUBCUT: ICD-10-PCS | Mod: S$GLB,,, | Performed by: PHYSICIAN ASSISTANT

## 2020-01-22 PROCEDURE — 96372 THER/PROPH/DIAG INJ SC/IM: CPT | Mod: S$GLB,,, | Performed by: PHYSICIAN ASSISTANT

## 2020-01-22 RX ORDER — BETAMETHASONE SODIUM PHOSPHATE AND BETAMETHASONE ACETATE 3; 3 MG/ML; MG/ML
6 INJECTION, SUSPENSION INTRA-ARTICULAR; INTRALESIONAL; INTRAMUSCULAR; SOFT TISSUE ONCE
Status: COMPLETED | OUTPATIENT
Start: 2020-01-22 | End: 2020-01-22

## 2020-01-22 RX ORDER — CITALOPRAM 20 MG/1
20 TABLET, FILM COATED ORAL DAILY
Qty: 90 TABLET | Refills: 1 | Status: SHIPPED | OUTPATIENT
Start: 2020-01-22 | End: 2020-11-01

## 2020-01-22 RX ORDER — ALBUTEROL SULFATE 90 UG/1
2 AEROSOL, METERED RESPIRATORY (INHALATION) EVERY 6 HOURS PRN
Qty: 18 G | Refills: 2 | Status: SHIPPED | OUTPATIENT
Start: 2020-01-22 | End: 2023-06-21

## 2020-01-22 RX ADMIN — BETAMETHASONE SODIUM PHOSPHATE AND BETAMETHASONE ACETATE 6 MG: 3; 3 INJECTION, SUSPENSION INTRA-ARTICULAR; INTRALESIONAL; INTRAMUSCULAR; SOFT TISSUE at 07:01

## 2020-01-24 ENCOUNTER — LAB VISIT (OUTPATIENT)
Dept: LAB | Facility: HOSPITAL | Age: 60
End: 2020-01-24
Attending: FAMILY MEDICINE
Payer: COMMERCIAL

## 2020-01-24 DIAGNOSIS — E78.5 HYPERLIPIDEMIA, UNSPECIFIED HYPERLIPIDEMIA TYPE: ICD-10-CM

## 2020-01-24 DIAGNOSIS — R73.9 HYPERGLYCEMIA: ICD-10-CM

## 2020-01-24 LAB
ALBUMIN SERPL BCP-MCNC: 4.3 G/DL (ref 3.5–5.2)
ALP SERPL-CCNC: 73 U/L (ref 55–135)
ALT SERPL W/O P-5'-P-CCNC: 37 U/L (ref 10–44)
ANION GAP SERPL CALC-SCNC: 8 MMOL/L (ref 8–16)
AST SERPL-CCNC: 24 U/L (ref 10–40)
BILIRUB SERPL-MCNC: 0.7 MG/DL (ref 0.1–1)
BUN SERPL-MCNC: 28 MG/DL (ref 8–23)
CALCIUM SERPL-MCNC: 10.1 MG/DL (ref 8.7–10.5)
CHLORIDE SERPL-SCNC: 102 MMOL/L (ref 95–110)
CHOLEST SERPL-MCNC: 186 MG/DL (ref 120–199)
CHOLEST/HDLC SERPL: 2.9 {RATIO} (ref 2–5)
CO2 SERPL-SCNC: 29 MMOL/L (ref 23–29)
CREAT SERPL-MCNC: 1.3 MG/DL (ref 0.5–1.4)
EST. GFR  (AFRICAN AMERICAN): >60 ML/MIN/1.73 M^2
EST. GFR  (NON AFRICAN AMERICAN): 58.9 ML/MIN/1.73 M^2
ESTIMATED AVG GLUCOSE: 111 MG/DL (ref 68–131)
GLUCOSE SERPL-MCNC: 85 MG/DL (ref 70–110)
HBA1C MFR BLD HPLC: 5.5 % (ref 4–5.6)
HDLC SERPL-MCNC: 65 MG/DL (ref 40–75)
HDLC SERPL: 34.9 % (ref 20–50)
LDLC SERPL CALC-MCNC: 107.2 MG/DL (ref 63–159)
NONHDLC SERPL-MCNC: 121 MG/DL
POTASSIUM SERPL-SCNC: 4.7 MMOL/L (ref 3.5–5.1)
PROT SERPL-MCNC: 7.7 G/DL (ref 6–8.4)
SODIUM SERPL-SCNC: 139 MMOL/L (ref 136–145)
TRIGL SERPL-MCNC: 69 MG/DL (ref 30–150)

## 2020-01-24 PROCEDURE — 80061 LIPID PANEL: CPT

## 2020-01-24 PROCEDURE — 80053 COMPREHEN METABOLIC PANEL: CPT

## 2020-01-24 PROCEDURE — 36415 COLL VENOUS BLD VENIPUNCTURE: CPT | Mod: PO

## 2020-01-24 PROCEDURE — 83036 HEMOGLOBIN GLYCOSYLATED A1C: CPT

## 2020-01-24 RX ORDER — METHYLPREDNISOLONE 4 MG/1
TABLET ORAL
Qty: 1 PACKAGE | Refills: 0 | Status: SHIPPED | OUTPATIENT
Start: 2020-01-24 | End: 2020-02-19 | Stop reason: ALTCHOICE

## 2020-01-25 ENCOUNTER — PATIENT MESSAGE (OUTPATIENT)
Dept: FAMILY MEDICINE | Facility: CLINIC | Age: 60
End: 2020-01-25

## 2020-01-25 DIAGNOSIS — Z12.5 PROSTATE CANCER SCREENING: ICD-10-CM

## 2020-01-25 DIAGNOSIS — R06.02 SOB (SHORTNESS OF BREATH): Primary | ICD-10-CM

## 2020-01-27 ENCOUNTER — LAB VISIT (OUTPATIENT)
Dept: LAB | Facility: HOSPITAL | Age: 60
End: 2020-01-27
Attending: FAMILY MEDICINE
Payer: COMMERCIAL

## 2020-01-27 ENCOUNTER — TELEPHONE (OUTPATIENT)
Dept: UROLOGY | Facility: CLINIC | Age: 60
End: 2020-01-27

## 2020-01-27 DIAGNOSIS — Z12.5 PROSTATE CANCER SCREENING: ICD-10-CM

## 2020-01-27 LAB — COMPLEXED PSA SERPL-MCNC: 3.7 NG/ML (ref 0–4)

## 2020-01-27 PROCEDURE — 84153 ASSAY OF PSA TOTAL: CPT

## 2020-01-27 PROCEDURE — 36415 COLL VENOUS BLD VENIPUNCTURE: CPT | Mod: PO

## 2020-01-27 NOTE — TELEPHONE ENCOUNTER
Unable to add to previous labs.patient will have to get his PSA done today, tubs from fridays draw is not the right color tubs. Will [speak with patient regarding scheduling his PFT's.

## 2020-01-27 NOTE — TELEPHONE ENCOUNTER
++Attempted to call pt. LVM x1  Will contact via PP.     ----- Message from Clinton Vaughan sent at 1/27/2020  7:25 AM CST -----  Appointment Request From: Parth Juarez    With Provider: Jose Gibbs MD [[facility]]    Preferred Date Range: Any    Preferred Times: Monday Afternoon, Wednesday Afternoon, Thursday Afternoon, Friday Afternoon    Reason for visit: Existing Patient    Comments:  PSA

## 2020-01-28 ENCOUNTER — OFFICE VISIT (OUTPATIENT)
Dept: FAMILY MEDICINE | Facility: CLINIC | Age: 60
End: 2020-01-28
Payer: COMMERCIAL

## 2020-01-28 VITALS
SYSTOLIC BLOOD PRESSURE: 122 MMHG | HEIGHT: 67 IN | BODY MASS INDEX: 29.58 KG/M2 | TEMPERATURE: 98 F | OXYGEN SATURATION: 97 % | DIASTOLIC BLOOD PRESSURE: 68 MMHG | WEIGHT: 188.5 LBS | HEART RATE: 71 BPM

## 2020-01-28 DIAGNOSIS — I10 GOOD HYPERTENSION CONTROL: ICD-10-CM

## 2020-01-28 DIAGNOSIS — J45.990 EXERCISE-INDUCED ASTHMA: ICD-10-CM

## 2020-01-28 DIAGNOSIS — D50.9 IRON DEFICIENCY ANEMIA, UNSPECIFIED IRON DEFICIENCY ANEMIA TYPE: ICD-10-CM

## 2020-01-28 DIAGNOSIS — E78.5 HYPERLIPIDEMIA, UNSPECIFIED HYPERLIPIDEMIA TYPE: Primary | ICD-10-CM

## 2020-01-28 DIAGNOSIS — Z11.4 ENCOUNTER FOR SCREENING FOR HIV: ICD-10-CM

## 2020-01-28 DIAGNOSIS — R73.9 HYPERGLYCEMIA: ICD-10-CM

## 2020-01-28 PROCEDURE — 3008F PR BODY MASS INDEX (BMI) DOCUMENTED: ICD-10-PCS | Mod: CPTII,S$GLB,, | Performed by: FAMILY MEDICINE

## 2020-01-28 PROCEDURE — 3078F PR MOST RECENT DIASTOLIC BLOOD PRESSURE < 80 MM HG: ICD-10-PCS | Mod: CPTII,S$GLB,, | Performed by: FAMILY MEDICINE

## 2020-01-28 PROCEDURE — 3078F DIAST BP <80 MM HG: CPT | Mod: CPTII,S$GLB,, | Performed by: FAMILY MEDICINE

## 2020-01-28 PROCEDURE — 3074F SYST BP LT 130 MM HG: CPT | Mod: CPTII,S$GLB,, | Performed by: FAMILY MEDICINE

## 2020-01-28 PROCEDURE — 99999 PR PBB SHADOW E&M-EST. PATIENT-LVL III: CPT | Mod: PBBFAC,,, | Performed by: FAMILY MEDICINE

## 2020-01-28 PROCEDURE — 99214 OFFICE O/P EST MOD 30 MIN: CPT | Mod: S$GLB,,, | Performed by: FAMILY MEDICINE

## 2020-01-28 PROCEDURE — 99214 PR OFFICE/OUTPT VISIT, EST, LEVL IV, 30-39 MIN: ICD-10-PCS | Mod: S$GLB,,, | Performed by: FAMILY MEDICINE

## 2020-01-28 PROCEDURE — 3008F BODY MASS INDEX DOCD: CPT | Mod: CPTII,S$GLB,, | Performed by: FAMILY MEDICINE

## 2020-01-28 PROCEDURE — 3074F PR MOST RECENT SYSTOLIC BLOOD PRESSURE < 130 MM HG: ICD-10-PCS | Mod: CPTII,S$GLB,, | Performed by: FAMILY MEDICINE

## 2020-01-28 PROCEDURE — 99999 PR PBB SHADOW E&M-EST. PATIENT-LVL III: ICD-10-PCS | Mod: PBBFAC,,, | Performed by: FAMILY MEDICINE

## 2020-01-28 RX ORDER — LOSARTAN POTASSIUM 100 MG/1
TABLET ORAL
COMMUNITY
Start: 2019-10-29 | End: 2020-11-01

## 2020-01-28 NOTE — PROGRESS NOTES
Subjective:       Patient ID: Parth Juarez is a 59 y.o. male.    Chief Complaint: Follow-up    HPI  Review of Systems   Constitutional: Negative for fatigue and unexpected weight change.   Respiratory: Positive for shortness of breath and wheezing. Negative for chest tightness.    Cardiovascular: Negative for chest pain, palpitations and leg swelling.   Gastrointestinal: Negative for abdominal pain.   Musculoskeletal: Negative for arthralgias.   Neurological: Negative for dizziness, syncope, light-headedness and headaches.       Patient Active Problem List   Diagnosis    Hyperlipidemia    Good hypertension control    Exercise-induced asthma    Positive for microalbuminuria    Hyperglycemia    Anemia    NICCI (iron deficiency anemia)    ADHD (attention deficit hyperactivity disorder), combined type    Anxiety     Patient is here for a chronic conditions follow up.    Has h/o exercise induced asthma and possibly as child.  Has been recovering from asthma exacerbation last 2 weeks. Cough and wheezing are improving after medrol dose pack.  On pack now and nursing home through. Has PFTs order to be done 2 weeks after sx improve.     Reviewed labs 1/20.  PSA at baseline .  Has mild obstructive sx frequency and dribbling. Non hesitancy or nocturia. Has mild leakage when rushed.  Does not wish to take meds at this time  Objective:      Physical Exam   Constitutional: He is oriented to person, place, and time. He appears well-developed and well-nourished.   Cardiovascular: Normal rate, regular rhythm and normal heart sounds.   Pulmonary/Chest: Effort normal and breath sounds normal.   Musculoskeletal: He exhibits no edema.   Neurological: He is alert and oriented to person, place, and time.   Skin: Skin is warm and dry.   Psychiatric: He has a normal mood and affect.   Nursing note and vitals reviewed.      Assessment:       1. Hyperlipidemia, unspecified hyperlipidemia type    2. Good hypertension control    3.  Hyperglycemia    4. Iron deficiency anemia, unspecified iron deficiency anemia type    5. Encounter for screening for HIV    6. Exercise-induced asthma        Plan:         1. Hyperlipidemia, unspecified hyperlipidemia type  Controlled on current medications.  Continue current medications.    - Comprehensive metabolic panel; Future  - Lipid panel; Future    2. Good hypertension control  Controlled on current medications.  Continue current medications.      3. Hyperglycemia  Non diabetic range.  Your blood sugar is borderline high.  This means you are at risk for developing type 2 diabetes mellitus.  To lessen your risk you should exercise regularly, avoid excess carbohydrates and work toward a body mass index of less than 25.      - CBC auto differential; Future  - Microalbumin/creatinine urine ratio; Future    4. Iron deficiency anemia, unspecified iron deficiency anemia type  Controlled on current medications.  Continue current medications.      5. Encounter for screening for HIV  Screen and treat as indicated:    - HIV 1/2 Ag/Ab (4th Gen); Future    6. Exercise-induced asthma  Cont use of albuterol prn. pfts pending        Time spent with patient: 20 minutes    Patient with be reevaluated in 6 months or sooner prn    Greater than 50% of this visit was spent counseling as described in above documentation:Yes

## 2020-01-30 ENCOUNTER — PATIENT OUTREACH (OUTPATIENT)
Dept: OTHER | Facility: OTHER | Age: 60
End: 2020-01-30

## 2020-02-03 ENCOUNTER — HOSPITAL ENCOUNTER (OUTPATIENT)
Dept: PULMONOLOGY | Facility: HOSPITAL | Age: 60
Discharge: HOME | End: 2020-02-03
Attending: FAMILY MEDICINE
Payer: COMMERCIAL

## 2020-02-03 DIAGNOSIS — R06.02 SOB (SHORTNESS OF BREATH): ICD-10-CM

## 2020-02-03 DIAGNOSIS — J45.990 EXERCISE-INDUCED ASTHMA: Primary | ICD-10-CM

## 2020-02-03 PROCEDURE — 94060 PR EVAL OF BRONCHOSPASM: ICD-10-PCS | Mod: 26,,, | Performed by: INTERNAL MEDICINE

## 2020-02-03 PROCEDURE — 94060 EVALUATION OF WHEEZING: CPT | Mod: 26,,, | Performed by: INTERNAL MEDICINE

## 2020-02-03 PROCEDURE — 94727 GAS DIL/WSHOT DETER LNG VOL: CPT | Mod: 26,,, | Performed by: INTERNAL MEDICINE

## 2020-02-03 PROCEDURE — 94060 EVALUATION OF WHEEZING: CPT

## 2020-02-03 PROCEDURE — 94727 GAS DIL/WSHOT DETER LNG VOL: CPT

## 2020-02-03 PROCEDURE — 94729 DIFFUSING CAPACITY: CPT | Mod: 26,,, | Performed by: INTERNAL MEDICINE

## 2020-02-03 PROCEDURE — 94729 DIFFUSING CAPACITY: CPT

## 2020-02-03 PROCEDURE — 94727 PR PULM FUNCTION TEST BY GAS: ICD-10-PCS | Mod: 26,,, | Performed by: INTERNAL MEDICINE

## 2020-02-03 PROCEDURE — 94729 PR C02/MEMBANE DIFFUSE CAPACITY: ICD-10-PCS | Mod: 26,,, | Performed by: INTERNAL MEDICINE

## 2020-02-03 RX ORDER — FLUTICASONE PROPIONATE 220 UG/1
1 AEROSOL, METERED RESPIRATORY (INHALATION) 2 TIMES DAILY
Qty: 36 G | Refills: 3 | Status: SHIPPED | OUTPATIENT
Start: 2020-02-03 | End: 2020-02-04

## 2020-02-04 LAB
BRPFT: ABNORMAL
DLCO ADJ PRE: 21.21 ML/(MIN*MMHG) (ref 19.44–33.3)
DLCO SINGLE BREATH LLN: 19.44
DLCO SINGLE BREATH PRE REF: 80.4 %
DLCO SINGLE BREATH REF: 26.37
DLCOC SBVA LLN: 2.77
DLCOC SBVA PRE REF: 138.4 %
DLCOC SBVA REF: 4.05
DLCOC SINGLE BREATH LLN: 19.44
DLCOC SINGLE BREATH PRE REF: 80.4 %
DLCOC SINGLE BREATH REF: 26.37
DLCOVA LLN: 2.77
DLCOVA PRE REF: 138.4 %
DLCOVA PRE: 5.61 ML/(MIN*MMHG*L) (ref 2.77–5.34)
DLCOVA REF: 4.05
DLVAADJ PRE: 5.61 ML/(MIN*MMHG*L) (ref 2.77–5.34)
ERVN2 LLN: 1.1
ERVN2 PRE REF: 63.9 %
ERVN2 PRE: 0.7 L (ref 1.1–1.1)
ERVN2 REF: 1.1
FEF 25 75 CHG: -25.8 %
FEF 25 75 LLN: 1.29
FEF 25 75 POST REF: 90.1 %
FEF 25 75 PRE REF: 121.4 %
FEF 25 75 REF: 2.67
FET100 CHG: 113.2 %
FEV1 CHG: 4.1 %
FEV1 FVC CHG: -6.3 %
FEV1 FVC LLN: 65
FEV1 FVC POST REF: 106.8 %
FEV1 FVC PRE REF: 114 %
FEV1 FVC REF: 78
FEV1 LLN: 2.38
FEV1 POST REF: 73.1 %
FEV1 PRE REF: 70.2 %
FEV1 REF: 3.19
FRCN2 LLN: 2.45
FRCN2 PRE REF: 54.4 %
FRCN2 REF: 3.44
FVC CHG: 11.1 %
FVC LLN: 3.11
FVC POST REF: 68.3 %
FVC PRE REF: 61.5 %
FVC REF: 4.1
IVC PRE: 2.41 L (ref 3.11–5.09)
IVC SINGLE BREATH LLN: 3.11
IVC SINGLE BREATH PRE REF: 58.8 %
IVC SINGLE BREATH REF: 4.1
MVV LLN: 104
MVV POST REF: 37.7 %
MVV POST: 46 L/MIN (ref 103.73–140.35)
MVV REF: 122
PEF CHG: -0.3 %
PEF LLN: 5.96
PEF POST REF: 85.1 %
PEF PRE REF: 85.3 %
PEF REF: 8.39
POST FEF 25 75: 2.41 L/S (ref 1.29–4.06)
POST FET 100: 6.57 SEC
POST FEV1 FVC: 83.05 % (ref 65.42–90.08)
POST FEV1: 2.33 L (ref 2.38–3.99)
POST FVC: 2.8 L (ref 3.11–5.09)
POST PEF: 7.14 L/S (ref 5.96–10.82)
PRE DLCO: 21.21 ML/(MIN*MMHG) (ref 19.44–33.3)
PRE FEF 25 75: 3.24 L/S (ref 1.29–4.06)
PRE FET 100: 3.08 SEC
PRE FEV1 FVC: 88.66 % (ref 65.42–90.08)
PRE FEV1: 2.24 L (ref 2.38–3.99)
PRE FRC N2: 1.87 L
PRE FVC: 2.52 L (ref 3.11–5.09)
PRE PEF: 7.16 L/S (ref 5.96–10.82)
RVN2 LLN: 1.66
RVN2 PRE REF: 40.8 %
RVN2 PRE: 0.95 L (ref 1.66–3.01)
RVN2 REF: 2.34
RVN2TLCN2 LLN: 28.77
RVN2TLCN2 PRE REF: 72.7 %
RVN2TLCN2 PRE: 27.45 % (ref 28.77–46.73)
RVN2TLCN2 REF: 37.75
TLCN2 LLN: 5.35
TLCN2 PRE REF: 53.5 %
TLCN2 PRE: 3.48 L (ref 5.35–7.65)
TLCN2 REF: 6.5
VA PRE: 3.77 L (ref 6.35–6.35)
VA SINGLE BREATH LLN: 6.35
VA SINGLE BREATH PRE REF: 59.4 %
VA SINGLE BREATH REF: 6.35
VCMAXN2 LLN: 3.11
VCMAXN2 PRE REF: 61.5 %
VCMAXN2 PRE: 2.52 L (ref 3.11–5.09)
VCMAXN2 REF: 4.1

## 2020-02-19 ENCOUNTER — OFFICE VISIT (OUTPATIENT)
Dept: UROLOGY | Facility: CLINIC | Age: 60
End: 2020-02-19
Payer: COMMERCIAL

## 2020-02-19 VITALS
SYSTOLIC BLOOD PRESSURE: 135 MMHG | HEART RATE: 57 BPM | DIASTOLIC BLOOD PRESSURE: 79 MMHG | HEIGHT: 67 IN | RESPIRATION RATE: 16 BRPM | TEMPERATURE: 98 F | BODY MASS INDEX: 29.35 KG/M2 | WEIGHT: 187 LBS

## 2020-02-19 DIAGNOSIS — N40.0 BENIGN PROSTATIC HYPERPLASIA WITHOUT LOWER URINARY TRACT SYMPTOMS: Primary | ICD-10-CM

## 2020-02-19 LAB
BILIRUB SERPL-MCNC: NEGATIVE MG/DL
BLOOD URINE, POC: NEGATIVE
COLOR, POC UA: NORMAL
GLUCOSE UR QL STRIP: NEGATIVE
KETONES UR QL STRIP: NEGATIVE
LEUKOCYTE ESTERASE URINE, POC: NEGATIVE
NITRITE, POC UA: NEGATIVE
PH, POC UA: 5
PROTEIN, POC: NEGATIVE
SPECIFIC GRAVITY, POC UA: 1.02
UROBILINOGEN, POC UA: NEGATIVE

## 2020-02-19 PROCEDURE — 3075F PR MOST RECENT SYSTOLIC BLOOD PRESS GE 130-139MM HG: ICD-10-PCS | Mod: S$GLB,,, | Performed by: UROLOGY

## 2020-02-19 PROCEDURE — 3008F BODY MASS INDEX DOCD: CPT | Mod: S$GLB,,, | Performed by: UROLOGY

## 2020-02-19 PROCEDURE — 3078F DIAST BP <80 MM HG: CPT | Mod: S$GLB,,, | Performed by: UROLOGY

## 2020-02-19 PROCEDURE — 81002 URINALYSIS NONAUTO W/O SCOPE: CPT | Mod: S$GLB,,, | Performed by: UROLOGY

## 2020-02-19 PROCEDURE — 3078F PR MOST RECENT DIASTOLIC BLOOD PRESSURE < 80 MM HG: ICD-10-PCS | Mod: S$GLB,,, | Performed by: UROLOGY

## 2020-02-19 PROCEDURE — 99999 PR PBB SHADOW E&M-EST. PATIENT-LVL III: ICD-10-PCS | Mod: PBBFAC,,, | Performed by: UROLOGY

## 2020-02-19 PROCEDURE — 3075F SYST BP GE 130 - 139MM HG: CPT | Mod: S$GLB,,, | Performed by: UROLOGY

## 2020-02-19 PROCEDURE — 99214 PR OFFICE/OUTPT VISIT, EST, LEVL IV, 30-39 MIN: ICD-10-PCS | Mod: 25,S$GLB,, | Performed by: UROLOGY

## 2020-02-19 PROCEDURE — 3008F PR BODY MASS INDEX (BMI) DOCUMENTED: ICD-10-PCS | Mod: S$GLB,,, | Performed by: UROLOGY

## 2020-02-19 PROCEDURE — 99999 PR PBB SHADOW E&M-EST. PATIENT-LVL III: CPT | Mod: PBBFAC,,, | Performed by: UROLOGY

## 2020-02-19 PROCEDURE — 99214 OFFICE O/P EST MOD 30 MIN: CPT | Mod: 25,S$GLB,, | Performed by: UROLOGY

## 2020-02-19 PROCEDURE — 81002 POCT URINE DIPSTICK WITHOUT MICROSCOPE: ICD-10-PCS | Mod: S$GLB,,, | Performed by: UROLOGY

## 2020-02-19 RX ORDER — BECLOMETHASONE DIPROPIONATE HFA 40 UG/1
1 AEROSOL, METERED RESPIRATORY (INHALATION) 2 TIMES DAILY
COMMUNITY
Start: 2020-02-05 | End: 2020-12-16

## 2020-02-19 NOTE — LETTER
February 19, 2020      Betty Styles MD  [address] [facility] [address]           [facility]  [address] [place] [address]  Phone: [phone]  Fax: [phone]          Patient: Parth Juarez   MR Number: [id number]   YOB: 1960   Date of Visit: 2/19/2020       Dear Dr. Betty Styles:    Thank you for referring Parth Juarez to me for evaluation. Attached you will find relevant portions of my assessment and plan of care.    If you have questions, please do not hesitate to call me. I look forward to following Parth Juarez along with you.    Sincerely,    Jose Gibbs MD    Enclosure  CC:  No Recipients    If you would like to receive this communication electronically, please contact [email or web address] or [phone] to request more information on [employer] access.    For providers and/or their staff who would like to refer a patient to [facility], please contact us through our one-stop-shop provider referral line, [facility], at [phone].    If you feel you have received this communication in error or would no longer like to receive these types of communications, please e-mail [email or web address]

## 2020-02-19 NOTE — PROGRESS NOTES
Subjective:       Patient ID: Parth Juarez is a 59 y.o. male.    Chief Complaint:   BPH with mild lower urinary tract symptoms.    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  The patient referred that have noticed some increased lower urinary tract symptoms in the form of urgency terminal dribbling.  He also during the day referred have mild frequency but he believes that this is due to a diuretic that he is taking at the present time he refers that have nocturia times 0.  No dysuria or hematuria.  The flow is adequate.  And he feels like he empties the bladder well.    The family history is significant his father suffer of bladder CA was treated and never have recurrences.    The past medical and surgical history the current medications and allergies are well documented in the EHR including medications and allergies and all these were reviewed by me during this visit.    The urinalysis today is clear.  The last PSA was in January 28, 2020 3.7.  I did review the last PSAs for the last 3 years on they are all within range on these level today of 3.7 is not  out of previous PSA levels.    Review of Systems   Constitutional: Negative for activity change and appetite change.   HENT: Negative.    Eyes: Negative for discharge.   Respiratory: Negative for cough and shortness of breath.    Cardiovascular: Negative for chest pain and palpitations.   Gastrointestinal: Negative for abdominal distention, abdominal pain, constipation and vomiting.   Genitourinary: Positive for frequency and urgency. Negative for discharge, dysuria, flank pain, hematuria and testicular pain.   Musculoskeletal: Negative for arthralgias.   Skin: Negative for rash.   Neurological: Negative for dizziness.   Psychiatric/Behavioral: The patient is not nervous/anxious.          Objective:      Physical Exam   Constitutional: He appears well-developed and well-nourished.   HENT:    Head: Normocephalic.   Eyes: Pupils are equal, round, and reactive to light.   Neck: Normal range of motion.   Cardiovascular: Normal rate.    Pulmonary/Chest: Effort normal.   Abdominal: Soft. He exhibits no distension and no mass. There is no tenderness.   Genitourinary: Prostate normal and penis normal. Rectal exam shows external hemorrhoid. Rectal exam shows no mass and no tenderness. Prostate is not enlarged and not tender. Right testis shows no mass and no tenderness. Left testis shows no mass and no tenderness. No discharge found.       Musculoskeletal: Normal range of motion.   Neurological: He is alert.   Skin: Skin is warm.     Psychiatric: He has a normal mood and affect.     postvoid residual urine 17 mL  Assessment:       1. Benign prostatic hyperplasia without lower urinary tract symptoms        Plan:       Benign prostatic hyperplasia without lower urinary tract symptoms  -     POCT URINE DIPSTICK WITHOUT MICROSCOPE     Today I have a lengthy discussion with the patient regarding his frequency and mild overactive bladder. At the end of the conversation the patient expressed that he probably can manage these with more frequent urination and he would like not to be medicated at the present time for the symptoms.  I agree for that approach at the present time.  He is going to return to the clinic in 1 year

## 2020-04-06 NOTE — PROGRESS NOTES
Digital Medicine: Health  Follow-Up    Patient reported that he is doing well but he was in the middle of an interview and asked to call me back. Patient AVG bP is well within goal (SBP trending up and DBP trending down) and readings have looked fairly consistent with more frequent testing. Patient states that he knows that he needs to be more consistent with readings and will do so. Patient has not checked previous Tabtor messages sent. Will f/u in 2-3 weeks if no CB.      The history is provided by the patient. No  was used.     Follow Up  Follow-up reason(s): reading review      Readings are missing.   patient reminder needed.      Intervention/Plan    There are no preventive care reminders to display for this patient.    Last 5 Patient Entered Readings                                      Current 30 Day Average: 115/76     Recent Readings 3/28/2020 3/28/2020 3/26/2020 3/26/2020 3/22/2020    SBP (mmHg) 110 116 122 121 123    DBP (mmHg) 72 72 75 63 90    Pulse 75 74 58 60 59                      Diet Screening       Deferred.    Physical Activity Screening       Deferred.    Medication Adherence Screening       Deferred.      SDOH

## 2020-04-19 RX ORDER — BUPROPION HYDROCHLORIDE 150 MG/1
150 TABLET ORAL EVERY MORNING
Qty: 90 TABLET | Refills: 1 | Status: SHIPPED | OUTPATIENT
Start: 2020-04-19 | End: 2020-07-23 | Stop reason: SDUPTHER

## 2020-04-21 DIAGNOSIS — Z01.84 ANTIBODY RESPONSE EXAMINATION: ICD-10-CM

## 2020-04-23 ENCOUNTER — LAB VISIT (OUTPATIENT)
Dept: LAB | Facility: HOSPITAL | Age: 60
End: 2020-04-23
Attending: INTERNAL MEDICINE
Payer: COMMERCIAL

## 2020-04-23 DIAGNOSIS — Z01.84 ANTIBODY RESPONSE EXAMINATION: ICD-10-CM

## 2020-04-23 LAB — SARS-COV-2 IGG SERPL QL IA: NEGATIVE

## 2020-04-23 PROCEDURE — 86769 SARS-COV-2 COVID-19 ANTIBODY: CPT

## 2020-04-23 PROCEDURE — 36415 COLL VENOUS BLD VENIPUNCTURE: CPT

## 2020-05-06 ENCOUNTER — OFFICE VISIT (OUTPATIENT)
Dept: OPTOMETRY | Facility: CLINIC | Age: 60
End: 2020-05-06
Payer: COMMERCIAL

## 2020-05-06 DIAGNOSIS — H52.7 REFRACTIVE ERROR: ICD-10-CM

## 2020-05-06 DIAGNOSIS — H25.13 NUCLEAR SCLEROSIS, BILATERAL: Primary | ICD-10-CM

## 2020-05-06 PROCEDURE — 99999 PR PBB SHADOW E&M-EST. PATIENT-LVL II: CPT | Mod: PBBFAC,,, | Performed by: OPTOMETRIST

## 2020-05-06 PROCEDURE — 92015 DETERMINE REFRACTIVE STATE: CPT | Mod: S$GLB,,, | Performed by: OPTOMETRIST

## 2020-05-06 PROCEDURE — 99999 PR PBB SHADOW E&M-EST. PATIENT-LVL II: ICD-10-PCS | Mod: PBBFAC,,, | Performed by: OPTOMETRIST

## 2020-05-06 PROCEDURE — 92014 PR EYE EXAM, EST PATIENT,COMPREHESV: ICD-10-PCS | Mod: S$GLB,,, | Performed by: OPTOMETRIST

## 2020-05-06 PROCEDURE — 92014 COMPRE OPH EXAM EST PT 1/>: CPT | Mod: S$GLB,,, | Performed by: OPTOMETRIST

## 2020-05-06 PROCEDURE — 92015 PR REFRACTION: ICD-10-PCS | Mod: S$GLB,,, | Performed by: OPTOMETRIST

## 2020-05-06 NOTE — PROGRESS NOTES
HPI     Pt here today for annual exam.   Pt states slight decrease at distance OD   only.  Pt would like updated specs rx due to lenses scratched.  Denies any eye allergies, pain, light flashes or floaters.    Last edited by Alia Jamil on 5/6/2020  2:44 PM. (History)            Assessment /Plan     For exam results, see Encounter Report.    Nuclear sclerosis, bilateral    Refractive error      Mild cataracts OU. Discussed possible ocular affects of cataracts. Acceptable BCVA OU. Discussed treatment options. Surgery not recommended at this time. Monitor yearly.     Dispensed updated spectacle Rx, mild change from previous. Discussed various spectacle lens options. Discussed adaptation period to new specs.  Demonstrated new spec Rx vs current specs in phoropter with patient satisfaction.      RTC in 1 year for comprehensive eye exam, or sooner prn.

## 2020-05-19 ENCOUNTER — PATIENT OUTREACH (OUTPATIENT)
Dept: OTHER | Facility: OTHER | Age: 60
End: 2020-05-19

## 2020-05-19 NOTE — PROGRESS NOTES
Last 5 Patient Entered Readings                                      Current 30 Day Average: 118/79     Recent Readings 5/14/2020 5/14/2020 5/14/2020 5/13/2020 5/13/2020    SBP (mmHg) 139 137 136 132 130    DBP (mmHg) 83 83 83 89 93    Pulse 51 55 54 65 65        Hypertension Medications             hydroCHLOROthiazide (HYDRODIURIL) 25 MG tablet Take 1 tablet (25 mg total) by mouth once daily.    losartan (COZAAR) 100 MG tablet     metoprolol succinate (TOPROL-XL) 25 MG 24 hr tablet TAKE 1 TABLET BY MOUTH ONCE DAILY        Called patient to follow up. Reviewed recent readings and labs (last CMP drawn on 1/24/20). Per 2017 ACC/ AHA HTN guidelines  (goal of BP < 130/80), current 30-day average is well controlled.  LVM, requested patient call back at his convenience if he has any questions or concerns.  Will continue to monitor. WCB in 3-4 months, sooner if BP begins to trend up or down.

## 2020-06-30 ENCOUNTER — PATIENT OUTREACH (OUTPATIENT)
Dept: OTHER | Facility: OTHER | Age: 60
End: 2020-06-30

## 2020-07-22 ENCOUNTER — TELEPHONE (OUTPATIENT)
Dept: FAMILY MEDICINE | Facility: CLINIC | Age: 60
End: 2020-07-22

## 2020-07-22 ENCOUNTER — LAB VISIT (OUTPATIENT)
Dept: PRIMARY CARE CLINIC | Facility: CLINIC | Age: 60
End: 2020-07-22
Payer: COMMERCIAL

## 2020-07-22 DIAGNOSIS — Z20.822 EXPOSURE TO COVID-19 VIRUS: ICD-10-CM

## 2020-07-22 DIAGNOSIS — Z20.822 EXPOSURE TO COVID-19 VIRUS: Primary | ICD-10-CM

## 2020-07-22 DIAGNOSIS — R68.83 CHILLS: Primary | ICD-10-CM

## 2020-07-22 PROCEDURE — U0003 INFECTIOUS AGENT DETECTION BY NUCLEIC ACID (DNA OR RNA); SEVERE ACUTE RESPIRATORY SYNDROME CORONAVIRUS 2 (SARS-COV-2) (CORONAVIRUS DISEASE [COVID-19]), AMPLIFIED PROBE TECHNIQUE, MAKING USE OF HIGH THROUGHPUT TECHNOLOGIES AS DESCRIBED BY CMS-2020-01-R: HCPCS

## 2020-07-22 NOTE — PROGRESS NOTES
Works in health care.  Conversation with neighbor who later tested positive. Social distancing was maintained.   Chills started last night. No other reported s/s at this time.

## 2020-07-23 ENCOUNTER — OFFICE VISIT (OUTPATIENT)
Dept: FAMILY MEDICINE | Facility: CLINIC | Age: 60
End: 2020-07-23
Payer: COMMERCIAL

## 2020-07-23 VITALS
HEIGHT: 67 IN | HEART RATE: 61 BPM | WEIGHT: 180.75 LBS | DIASTOLIC BLOOD PRESSURE: 70 MMHG | BODY MASS INDEX: 28.37 KG/M2 | TEMPERATURE: 98 F | SYSTOLIC BLOOD PRESSURE: 109 MMHG

## 2020-07-23 DIAGNOSIS — R80.9 POSITIVE FOR MICROALBUMINURIA: ICD-10-CM

## 2020-07-23 DIAGNOSIS — R73.9 HYPERGLYCEMIA: ICD-10-CM

## 2020-07-23 DIAGNOSIS — D50.9 IRON DEFICIENCY ANEMIA, UNSPECIFIED IRON DEFICIENCY ANEMIA TYPE: ICD-10-CM

## 2020-07-23 DIAGNOSIS — Z20.822 EXPOSURE TO COVID-19 VIRUS: ICD-10-CM

## 2020-07-23 DIAGNOSIS — F41.9 ANXIETY: ICD-10-CM

## 2020-07-23 DIAGNOSIS — I10 GOOD HYPERTENSION CONTROL: ICD-10-CM

## 2020-07-23 DIAGNOSIS — J06.9 UPPER RESPIRATORY TRACT INFECTION, UNSPECIFIED TYPE: ICD-10-CM

## 2020-07-23 DIAGNOSIS — E78.5 HYPERLIPIDEMIA, UNSPECIFIED HYPERLIPIDEMIA TYPE: Primary | ICD-10-CM

## 2020-07-23 LAB — SARS-COV-2 RNA RESP QL NAA+PROBE: NOT DETECTED

## 2020-07-23 PROCEDURE — 99214 PR OFFICE/OUTPT VISIT, EST, LEVL IV, 30-39 MIN: ICD-10-PCS | Mod: 95,,, | Performed by: FAMILY MEDICINE

## 2020-07-23 PROCEDURE — 3008F BODY MASS INDEX DOCD: CPT | Mod: CPTII,95,, | Performed by: FAMILY MEDICINE

## 2020-07-23 PROCEDURE — 99214 OFFICE O/P EST MOD 30 MIN: CPT | Mod: 95,,, | Performed by: FAMILY MEDICINE

## 2020-07-23 PROCEDURE — 3074F PR MOST RECENT SYSTOLIC BLOOD PRESSURE < 130 MM HG: ICD-10-PCS | Mod: CPTII,95,, | Performed by: FAMILY MEDICINE

## 2020-07-23 PROCEDURE — 3078F DIAST BP <80 MM HG: CPT | Mod: CPTII,95,, | Performed by: FAMILY MEDICINE

## 2020-07-23 PROCEDURE — 3008F PR BODY MASS INDEX (BMI) DOCUMENTED: ICD-10-PCS | Mod: CPTII,95,, | Performed by: FAMILY MEDICINE

## 2020-07-23 PROCEDURE — 3074F SYST BP LT 130 MM HG: CPT | Mod: CPTII,95,, | Performed by: FAMILY MEDICINE

## 2020-07-23 PROCEDURE — 3078F PR MOST RECENT DIASTOLIC BLOOD PRESSURE < 80 MM HG: ICD-10-PCS | Mod: CPTII,95,, | Performed by: FAMILY MEDICINE

## 2020-07-23 RX ORDER — BUPROPION HYDROCHLORIDE 150 MG/1
150 TABLET ORAL EVERY MORNING
Qty: 90 TABLET | Refills: 3 | Status: SHIPPED | OUTPATIENT
Start: 2020-07-23 | End: 2020-10-31 | Stop reason: SDUPTHER

## 2020-07-23 NOTE — PROGRESS NOTES
Subjective:       Patient ID: Parth Juarez is a 60 y.o. male.    Chief Complaint: No chief complaint on file.    HPI  Review of Systems   Constitutional: Negative for activity change and unexpected weight change.   HENT: Positive for rhinorrhea. Negative for hearing loss and trouble swallowing.    Eyes: Negative for discharge and visual disturbance.   Respiratory: Negative for chest tightness and wheezing.    Cardiovascular: Negative for chest pain and palpitations.   Gastrointestinal: Negative for blood in stool, constipation, diarrhea and vomiting.   Endocrine: Negative for polydipsia and polyuria.   Genitourinary: Negative for difficulty urinating, hematuria and urgency.   Musculoskeletal: Positive for neck pain. Negative for arthralgias and joint swelling.   Neurological: Negative for weakness and headaches.   Psychiatric/Behavioral: Negative for confusion and dysphoric mood.       Patient Active Problem List   Diagnosis    Hyperlipidemia    Good hypertension control    Exercise-induced asthma    Positive for microalbuminuria    Hyperglycemia    Anemia    NICCI (iron deficiency anemia)    ADHD (attention deficit hyperactivity disorder), combined type    Anxiety     Patient is here for telemedicine visit  The patient location is: home in [place], [place]  The chief complaint leading to consultation is: chronic conditions check  Visit type: Virtual visit with synchronous audio and video  Total time spent with patient: 10-20 min  Each patient to whom he or she provides medical services by telemedicine is:  (1) informed of the relationship between the physician and patient and the respective role of any other health care provider with respect to management of the patient; and (2) notified that he or she may decline to receive medical services by telemedicine and may withdraw from such care at any time.    Notes: see below   AUDIO VISIT ONLY? NO    /70 left arm home reading , pulse 61, t 98 oral, 82 kg.  Enrolled in digital htn     Exposed to COVID 19 by neighbor last week and had subj fever and chills 2 days ago.  COVID screening done and is negative.  Has been afebrile for 2 days and has no sig sx.      Urology Dr. Gibbs monitoring for mild LUTS due to BPH   Objective:      Physical Exam  Vitals signs and nursing note reviewed.   Constitutional:       Appearance: He is well-developed.   Cardiovascular:      Heart sounds: Normal heart sounds.   Pulmonary:      Effort: Pulmonary effort is normal.      Breath sounds: Normal breath sounds.   Neurological:      General: No focal deficit present.      Mental Status: He is alert.   Psychiatric:         Mood and Affect: Mood normal.         Thought Content: Thought content normal.         Judgment: Judgment normal.         Assessment:       1. Hyperlipidemia, unspecified hyperlipidemia type    2. Positive for microalbuminuria    3. Good hypertension control    4. Iron deficiency anemia, unspecified iron deficiency anemia type    5. Hyperglycemia    6. Exposure to Covid-19 Virus    7. Anxiety    8. Upper respiratory tract infection, unspecified type        Plan:         1. Hyperlipidemia, unspecified hyperlipidemia type  Stable condition.  Continue current medications.  Will adjust based on lab findings or if condition changes.    - Comprehensive metabolic panel; Future  - Lipid Panel; Future    2. Positive for microalbuminuria  Cont monitoring and cont ARB   - Microalbumin/creatinine urine ratio; Future    3. Good hypertension control  Controlled on current medications.  Continue current medications.      4. Iron deficiency anemia, unspecified iron deficiency anemia type  Screen and treat as indicated:    - CBC auto differential; Future  - Ferritin; Future  - Iron and TIBC; Future    5. Hyperglycemia  Controlled with diet.  Your blood sugar is borderline high.  This means you are at risk for developing type 2 diabetes mellitus.  To lessen your risk you should exercise  regularly, avoid excess carbohydrates and work toward a body mass index of less than 25.      - Hemoglobin A1C; Future    6. Exposure to Covid-19 Virus  Quaratine until results return. If neg and sx free then may rtw without restrictions.  If + may rtw after 10 days if fever free and sx improving for 3 days or more    7. Anxiety  Controlled. Cont current meds        8. Upper respiratory tract infection, unspecified type  General home care guidelines for cough and congestion:increase fluid intake, get plenty of rest, and use OTC cough and cold medications for relief of symptoms.  I recommended guafenesin for congestion and dextromethorphan as directed for cough.  Brands like Mucinex DM or Chloricidin HBP or similar are recommended.  Avoidance of decongestants is recommended for patients with heart problems and hypertension.  Extra vitamin C may also benefit.  Return to clinic if symptoms last longer than 10 days or sooner if worsen with symptoms like fever > 100.4, severe sinus pain or headache, thick yellow nasal discharge or sputum, dehydration, sudden confusion or mental status changes, shortness of breath, labored breathing, or lethargy. Anti-fever medications can be alternated like OTC ibuprofen or tylenol as needed and as directed unless told to avoid these by your doctor.       Time spent with patient: 20 minutes    Patient with be reevaluated in 6 months or sooner prn    Greater than 50% of this visit was spent counseling as described in above documentation:Yes

## 2020-07-24 ENCOUNTER — TELEPHONE (OUTPATIENT)
Dept: PRIMARY CARE CLINIC | Facility: OTHER | Age: 60
End: 2020-07-24

## 2020-07-29 ENCOUNTER — LAB VISIT (OUTPATIENT)
Dept: LAB | Facility: HOSPITAL | Age: 60
End: 2020-07-29
Attending: FAMILY MEDICINE
Payer: COMMERCIAL

## 2020-07-29 DIAGNOSIS — R80.9 POSITIVE FOR MICROALBUMINURIA: ICD-10-CM

## 2020-07-29 LAB
ALBUMIN/CREAT UR: 15.8 UG/MG (ref 0–30)
CREAT UR-MCNC: 139 MG/DL (ref 23–375)
MICROALBUMIN UR DL<=1MG/L-MCNC: 22 UG/ML

## 2020-07-29 PROCEDURE — 82043 UR ALBUMIN QUANTITATIVE: CPT

## 2020-09-08 ENCOUNTER — PATIENT OUTREACH (OUTPATIENT)
Dept: OTHER | Facility: OTHER | Age: 60
End: 2020-09-08

## 2020-09-08 NOTE — PROGRESS NOTES
Digital Medicine: Clinician Follow-Up    The history is provided by the patient.      Review of patient's allergies indicates:   -- No known drug allergies   Follow-up reason(s): routine follow up.     Hypertension    Patient readings are stable   Patient is not experiencing signs/symptoms of hypotension.  Patient is not experiencing signs/symptoms of hypertension.        Last 5 Patient Entered Readings                                      Current 30 Day Average: 109/76     Recent Readings 8/31/2020 8/31/2020 8/31/2020 8/19/2020 8/19/2020    SBP (mmHg) 115 114 115 107 110    DBP (mmHg) 82 69 71 70 77    Pulse 63 63 65 64 62            Depression Screening  Did not address depression screening.    Sleep Apnea Screening    Did not address sleep apnea screening.     Medication Affordability Screening  Did not address medication affordability screening.     Medication Adherence-Medication adherence was asssessed.  Patient continue taking medication as prescribed.          ASSESSMENT(S)  Patients BP average is 109/76 mmHg, which is at goal. Patient's BP goal is less than or equal to 130/80 per 2017 ACC/AHA Hypertension Guidelines.      Hypertension Plan  Continue current therapy.  Instructed patient to call if BP remains > 130/80 or if he begins to experience s/s of hypotension.     Will continue to monitor, WCB in 4-6 months.        Addressed any questions or concerns and patient has my contact information if needed prior to next outreach. Patient verbalizes understanding.          Hypertension Medications             hydroCHLOROthiazide (HYDRODIURIL) 25 MG tablet Take 1 tablet (25 mg total) by mouth once daily.    losartan (COZAAR) 100 MG tablet     metoprolol succinate (TOPROL-XL) 25 MG 24 hr tablet Take 1 tablet by mouth once daily

## 2020-10-31 ENCOUNTER — PATIENT MESSAGE (OUTPATIENT)
Dept: FAMILY MEDICINE | Facility: CLINIC | Age: 60
End: 2020-10-31

## 2020-10-31 DIAGNOSIS — F41.9 ANXIETY: ICD-10-CM

## 2020-11-01 RX ORDER — CITALOPRAM 20 MG/1
TABLET, FILM COATED ORAL
Qty: 90 TABLET | Refills: 0 | Status: SHIPPED | OUTPATIENT
Start: 2020-11-01 | End: 2021-01-02

## 2020-12-16 ENCOUNTER — IMMUNIZATION (OUTPATIENT)
Dept: PRIMARY CARE CLINIC | Facility: CLINIC | Age: 60
End: 2020-12-16
Payer: COMMERCIAL

## 2020-12-16 ENCOUNTER — OFFICE VISIT (OUTPATIENT)
Dept: FAMILY MEDICINE | Facility: CLINIC | Age: 60
End: 2020-12-16
Payer: COMMERCIAL

## 2020-12-16 VITALS
HEIGHT: 67 IN | OXYGEN SATURATION: 97 % | RESPIRATION RATE: 16 BRPM | HEART RATE: 55 BPM | TEMPERATURE: 99 F | WEIGHT: 181.88 LBS | SYSTOLIC BLOOD PRESSURE: 120 MMHG | BODY MASS INDEX: 28.55 KG/M2 | DIASTOLIC BLOOD PRESSURE: 66 MMHG

## 2020-12-16 DIAGNOSIS — M54.9 BACK PAIN, UNSPECIFIED BACK LOCATION, UNSPECIFIED BACK PAIN LATERALITY, UNSPECIFIED CHRONICITY: Primary | ICD-10-CM

## 2020-12-16 DIAGNOSIS — Z23 NEED FOR VACCINATION: ICD-10-CM

## 2020-12-16 LAB
BACTERIA #/AREA URNS AUTO: ABNORMAL /HPF
BILIRUB SERPL-MCNC: ABNORMAL MG/DL
BILIRUB UR QL STRIP: NEGATIVE
BLOOD URINE, POC: ABNORMAL
CLARITY UR REFRACT.AUTO: ABNORMAL
CLARITY, POC UA: CLEAR
COLOR UR AUTO: YELLOW
COLOR, POC UA: YELLOW
GLUCOSE UR QL STRIP: ABNORMAL
GLUCOSE UR QL STRIP: NEGATIVE
HGB UR QL STRIP: NEGATIVE
KETONES UR QL STRIP: ABNORMAL
KETONES UR QL STRIP: NEGATIVE
LEUKOCYTE ESTERASE UR QL STRIP: NEGATIVE
LEUKOCYTE ESTERASE URINE, POC: ABNORMAL
MICROSCOPIC COMMENT: ABNORMAL
NITRITE UR QL STRIP: NEGATIVE
NITRITE, POC UA: ABNORMAL
PH UR STRIP: 5 [PH] (ref 5–8)
PH, POC UA: 5
PROT UR QL STRIP: NEGATIVE
PROTEIN, POC: ABNORMAL
SP GR UR STRIP: 1.03 (ref 1–1.03)
SPECIFIC GRAVITY, POC UA: 1.02
URN SPEC COLLECT METH UR: ABNORMAL
UROBILINOGEN, POC UA: ABNORMAL

## 2020-12-16 PROCEDURE — 0001A COVID-19, MRNA, LNP-S, PF, 30 MCG/0.3 ML DOSE VACCINE: ICD-10-PCS | Mod: CV19,S$GLB,, | Performed by: FAMILY MEDICINE

## 2020-12-16 PROCEDURE — 1125F AMNT PAIN NOTED PAIN PRSNT: CPT | Mod: S$GLB,,, | Performed by: PHYSICIAN ASSISTANT

## 2020-12-16 PROCEDURE — 81002 URINALYSIS NONAUTO W/O SCOPE: CPT | Mod: S$GLB,,, | Performed by: PHYSICIAN ASSISTANT

## 2020-12-16 PROCEDURE — 81001 URINALYSIS AUTO W/SCOPE: CPT

## 2020-12-16 PROCEDURE — 3008F BODY MASS INDEX DOCD: CPT | Mod: CPTII,S$GLB,, | Performed by: PHYSICIAN ASSISTANT

## 2020-12-16 PROCEDURE — 99213 OFFICE O/P EST LOW 20 MIN: CPT | Mod: 25,S$GLB,, | Performed by: PHYSICIAN ASSISTANT

## 2020-12-16 PROCEDURE — 3074F SYST BP LT 130 MM HG: CPT | Mod: CPTII,S$GLB,, | Performed by: PHYSICIAN ASSISTANT

## 2020-12-16 PROCEDURE — 91300 COVID-19, MRNA, LNP-S, PF, 30 MCG/0.3 ML DOSE VACCINE: CPT | Mod: S$GLB,,, | Performed by: FAMILY MEDICINE

## 2020-12-16 PROCEDURE — 3078F PR MOST RECENT DIASTOLIC BLOOD PRESSURE < 80 MM HG: ICD-10-PCS | Mod: CPTII,S$GLB,, | Performed by: PHYSICIAN ASSISTANT

## 2020-12-16 PROCEDURE — 99999 PR PBB SHADOW E&M-EST. PATIENT-LVL IV: ICD-10-PCS | Mod: PBBFAC,,, | Performed by: PHYSICIAN ASSISTANT

## 2020-12-16 PROCEDURE — 1125F PR PAIN SEVERITY QUANTIFIED, PAIN PRESENT: ICD-10-PCS | Mod: S$GLB,,, | Performed by: PHYSICIAN ASSISTANT

## 2020-12-16 PROCEDURE — 99213 PR OFFICE/OUTPT VISIT, EST, LEVL III, 20-29 MIN: ICD-10-PCS | Mod: 25,S$GLB,, | Performed by: PHYSICIAN ASSISTANT

## 2020-12-16 PROCEDURE — 0001A COVID-19, MRNA, LNP-S, PF, 30 MCG/0.3 ML DOSE VACCINE: CPT | Mod: CV19,S$GLB,, | Performed by: FAMILY MEDICINE

## 2020-12-16 PROCEDURE — 3008F PR BODY MASS INDEX (BMI) DOCUMENTED: ICD-10-PCS | Mod: CPTII,S$GLB,, | Performed by: PHYSICIAN ASSISTANT

## 2020-12-16 PROCEDURE — 81002 POCT URINE DIPSTICK WITHOUT MICROSCOPE: ICD-10-PCS | Mod: S$GLB,,, | Performed by: PHYSICIAN ASSISTANT

## 2020-12-16 PROCEDURE — 91300 COVID-19, MRNA, LNP-S, PF, 30 MCG/0.3 ML DOSE VACCINE: ICD-10-PCS | Mod: S$GLB,,, | Performed by: FAMILY MEDICINE

## 2020-12-16 PROCEDURE — 3078F DIAST BP <80 MM HG: CPT | Mod: CPTII,S$GLB,, | Performed by: PHYSICIAN ASSISTANT

## 2020-12-16 PROCEDURE — 3074F PR MOST RECENT SYSTOLIC BLOOD PRESSURE < 130 MM HG: ICD-10-PCS | Mod: CPTII,S$GLB,, | Performed by: PHYSICIAN ASSISTANT

## 2020-12-16 PROCEDURE — 99999 PR PBB SHADOW E&M-EST. PATIENT-LVL IV: CPT | Mod: PBBFAC,,, | Performed by: PHYSICIAN ASSISTANT

## 2020-12-16 RX ORDER — NAPROXEN SODIUM 220 MG/1
81 TABLET, FILM COATED ORAL DAILY
COMMUNITY
End: 2022-10-06

## 2020-12-16 NOTE — PROGRESS NOTES
Subjective:       Patient ID: Parth Juarez is a 60 y.o. male.    Chief Complaint: Back Pain (right side )    Back Pain  This is a new problem. The current episode started yesterday. The problem occurs constantly. The problem has been gradually improving since onset. The pain is present in the costovertebral angle. The quality of the pain is described as aching. The pain is mild. The symptoms are aggravated by bending and twisting. Pertinent negatives include no abdominal pain, bladder incontinence, bowel incontinence, chest pain, dysuria, fever, headaches, paresis, paresthesias, pelvic pain, weakness or weight loss. He has tried nothing for the symptoms.     Review of patient's allergies indicates:   Allergen Reactions    No known drug allergies          Current Outpatient Medications:     albuterol (PROVENTIL/VENTOLIN HFA) 90 mcg/actuation inhaler, Inhale 2 puffs into the lungs every 6 (six) hours as needed for Wheezing., Disp: 18 g, Rfl: 2    ascorbic acid, vitamin C, (VITAMIN C) 500 MG tablet, Take 500 mg by mouth once daily., Disp: , Rfl:     aspirin 81 MG Chew, Take 81 mg by mouth once daily., Disp: , Rfl:     atorvastatin (LIPITOR) 40 MG tablet, Take 1 tablet (40 mg total) by mouth once daily., Disp: 90 tablet, Rfl: 3    citalopram (CELEXA) 20 MG tablet, Take 1 tablet by mouth once daily, Disp: 90 tablet, Rfl: 0    fluticasone (FLONASE) 50 mcg/actuation nasal spray, 1 spray by Each Nare route daily as needed for Rhinitis., Disp: , Rfl:     FOLIC ACID/MULTIVIT-MIN/LUTEIN (CENTRUM SILVER ORAL), Take 1 tablet by mouth once daily., Disp: , Rfl:     hydroCHLOROthiazide (HYDRODIURIL) 25 MG tablet, Take 1 tablet by mouth once daily, Disp: 90 tablet, Rfl: 3    inhalation device (AEROCHAMBER PLUS FLOW-VU), Use as directed for inhalation., Disp: 1 Device, Rfl: 0    losartan (COZAAR) 100 MG tablet, Take 1 tablet by mouth once daily, Disp: 90 tablet, Rfl: 3    metoprolol succinate (TOPROL-XL) 25 MG 24 hr  tablet, Take 1 tablet (25 mg total) by mouth once daily., Disp: 90 tablet, Rfl: 3    OMEGA-3S/DHA/EPA/FISH OIL (OMEGA 3 ORAL), Take 4 capsules by mouth once daily. , Disp: , Rfl:     psyllium (METAMUCIL) 0.52 gram capsule, Take 4 capsules by mouth once daily. , Disp: , Rfl:     buPROPion (WELLBUTRIN XL) 150 MG TB24 tablet, Take 1 tablet (150 mg total) by mouth every morning., Disp: 90 tablet, Rfl: 3    guaifenesin/dextromethorphan (CORICIDIN HBP ORAL), Take by mouth., Disp: , Rfl:     Lab Results   Component Value Date    WBC 5.06 07/29/2020    HGB 14.2 07/29/2020    HCT 44.9 07/29/2020     07/29/2020    CHOL 149 07/29/2020    TRIG 80 07/29/2020    HDL 57 07/29/2020    ALT 35 07/29/2020    AST 27 07/29/2020     07/29/2020    K 4.1 07/29/2020     07/29/2020    CREATININE 1.1 07/29/2020    BUN 18 07/29/2020    CO2 28 07/29/2020    TSH 2.078 01/03/2018    PSA 3.7 01/27/2020    GLUF 90 08/19/2004    HGBA1C 5.5 07/29/2020       Review of Systems   Constitutional: Negative for activity change, appetite change, fever and weight loss.   HENT: Negative for postnasal drip, rhinorrhea and sinus pressure.    Eyes: Negative for visual disturbance.   Respiratory: Negative for cough and shortness of breath.    Cardiovascular: Negative for chest pain.   Gastrointestinal: Negative for abdominal distention, abdominal pain and bowel incontinence.   Genitourinary: Negative for bladder incontinence, difficulty urinating, dysuria and pelvic pain.   Musculoskeletal: Positive for back pain. Negative for arthralgias and myalgias.   Neurological: Negative for weakness, headaches and paresthesias.   Hematological: Negative for adenopathy.   Psychiatric/Behavioral: The patient is not nervous/anxious.        Objective:      Physical Exam  Constitutional:       Appearance: Normal appearance.   Cardiovascular:      Rate and Rhythm: Normal rate and regular rhythm.      Heart sounds: Normal heart sounds.   Pulmonary:       Effort: Pulmonary effort is normal.      Breath sounds: Normal breath sounds. No wheezing.   Abdominal:      General: Bowel sounds are normal.      Palpations: Abdomen is soft.      Tenderness: There is no abdominal tenderness.      Comments: Negative CVA tenderness bilaterally   Skin:     Findings: No erythema.   Neurological:      Mental Status: He is alert and oriented to person, place, and time.   Psychiatric:         Behavior: Behavior normal.         Assessment:       1. Back pain, unspecified back location, unspecified back pain laterality, unspecified chronicity        Plan:     Parth was seen today for back pain.    Diagnoses and all orders for this visit:    Back pain, unspecified back location, unspecified back pain laterality, unspecified chronicity  -     POCT URINE DIPSTICK WITHOUT MICROSCOPE  -     Urinalysis; Future  -     Urinalysis    Other orders  -     Urinalysis Microscopic

## 2021-01-07 ENCOUNTER — IMMUNIZATION (OUTPATIENT)
Dept: PRIMARY CARE CLINIC | Facility: CLINIC | Age: 61
End: 2021-01-07
Payer: COMMERCIAL

## 2021-01-07 DIAGNOSIS — Z23 NEED FOR VACCINATION: ICD-10-CM

## 2021-01-07 PROCEDURE — 0002A COVID-19, MRNA, LNP-S, PF, 30 MCG/0.3 ML DOSE VACCINE: CPT | Mod: S$GLB,,, | Performed by: FAMILY MEDICINE

## 2021-01-07 PROCEDURE — 0002A COVID-19, MRNA, LNP-S, PF, 30 MCG/0.3 ML DOSE VACCINE: ICD-10-PCS | Mod: S$GLB,,, | Performed by: FAMILY MEDICINE

## 2021-01-07 PROCEDURE — 91300 COVID-19, MRNA, LNP-S, PF, 30 MCG/0.3 ML DOSE VACCINE: CPT | Mod: S$GLB,,, | Performed by: FAMILY MEDICINE

## 2021-01-07 PROCEDURE — 91300 COVID-19, MRNA, LNP-S, PF, 30 MCG/0.3 ML DOSE VACCINE: ICD-10-PCS | Mod: S$GLB,,, | Performed by: FAMILY MEDICINE

## 2021-01-19 ENCOUNTER — PATIENT MESSAGE (OUTPATIENT)
Dept: ADMINISTRATIVE | Facility: OTHER | Age: 61
End: 2021-01-19

## 2021-01-25 ENCOUNTER — OFFICE VISIT (OUTPATIENT)
Dept: FAMILY MEDICINE | Facility: CLINIC | Age: 61
End: 2021-01-25
Payer: COMMERCIAL

## 2021-01-25 VITALS
OXYGEN SATURATION: 97 % | HEIGHT: 67 IN | TEMPERATURE: 99 F | RESPIRATION RATE: 12 BRPM | HEART RATE: 61 BPM | DIASTOLIC BLOOD PRESSURE: 60 MMHG | WEIGHT: 181 LBS | SYSTOLIC BLOOD PRESSURE: 100 MMHG | BODY MASS INDEX: 28.41 KG/M2

## 2021-01-25 DIAGNOSIS — Z12.5 PROSTATE CANCER SCREENING: ICD-10-CM

## 2021-01-25 DIAGNOSIS — I95.9 HYPOTENSION, UNSPECIFIED HYPOTENSION TYPE: ICD-10-CM

## 2021-01-25 DIAGNOSIS — Z00.00 ANNUAL PHYSICAL EXAM: Primary | ICD-10-CM

## 2021-01-25 DIAGNOSIS — J45.990 EXERCISE-INDUCED ASTHMA: ICD-10-CM

## 2021-01-25 DIAGNOSIS — E78.5 HYPERLIPIDEMIA, UNSPECIFIED HYPERLIPIDEMIA TYPE: ICD-10-CM

## 2021-01-25 DIAGNOSIS — I10 HYPERTENSION, UNSPECIFIED TYPE: ICD-10-CM

## 2021-01-25 DIAGNOSIS — D50.9 IRON DEFICIENCY ANEMIA, UNSPECIFIED IRON DEFICIENCY ANEMIA TYPE: ICD-10-CM

## 2021-01-25 DIAGNOSIS — H26.9 CATARACT, UNSPECIFIED CATARACT TYPE, UNSPECIFIED LATERALITY: ICD-10-CM

## 2021-01-25 DIAGNOSIS — R73.9 HYPERGLYCEMIA: ICD-10-CM

## 2021-01-25 DIAGNOSIS — R80.9 POSITIVE FOR MICROALBUMINURIA: ICD-10-CM

## 2021-01-25 DIAGNOSIS — I10 GOOD HYPERTENSION CONTROL: ICD-10-CM

## 2021-01-25 DIAGNOSIS — M19.049 ARTHRITIS OF HAND: ICD-10-CM

## 2021-01-25 PROCEDURE — 3008F PR BODY MASS INDEX (BMI) DOCUMENTED: ICD-10-PCS | Mod: CPTII,S$GLB,, | Performed by: FAMILY MEDICINE

## 2021-01-25 PROCEDURE — 3074F SYST BP LT 130 MM HG: CPT | Mod: CPTII,S$GLB,, | Performed by: FAMILY MEDICINE

## 2021-01-25 PROCEDURE — 99999 PR PBB SHADOW E&M-EST. PATIENT-LVL IV: ICD-10-PCS | Mod: PBBFAC,,, | Performed by: FAMILY MEDICINE

## 2021-01-25 PROCEDURE — 3078F PR MOST RECENT DIASTOLIC BLOOD PRESSURE < 80 MM HG: ICD-10-PCS | Mod: CPTII,S$GLB,, | Performed by: FAMILY MEDICINE

## 2021-01-25 PROCEDURE — 3078F DIAST BP <80 MM HG: CPT | Mod: CPTII,S$GLB,, | Performed by: FAMILY MEDICINE

## 2021-01-25 PROCEDURE — 3008F BODY MASS INDEX DOCD: CPT | Mod: CPTII,S$GLB,, | Performed by: FAMILY MEDICINE

## 2021-01-25 PROCEDURE — 99396 PREV VISIT EST AGE 40-64: CPT | Mod: S$GLB,,, | Performed by: FAMILY MEDICINE

## 2021-01-25 PROCEDURE — 1126F AMNT PAIN NOTED NONE PRSNT: CPT | Mod: S$GLB,,, | Performed by: FAMILY MEDICINE

## 2021-01-25 PROCEDURE — 99999 PR PBB SHADOW E&M-EST. PATIENT-LVL IV: CPT | Mod: PBBFAC,,, | Performed by: FAMILY MEDICINE

## 2021-01-25 PROCEDURE — 1126F PR PAIN SEVERITY QUANTIFIED, NO PAIN PRESENT: ICD-10-PCS | Mod: S$GLB,,, | Performed by: FAMILY MEDICINE

## 2021-01-25 PROCEDURE — 99396 PR PREVENTIVE VISIT,EST,40-64: ICD-10-PCS | Mod: S$GLB,,, | Performed by: FAMILY MEDICINE

## 2021-01-25 PROCEDURE — 3074F PR MOST RECENT SYSTOLIC BLOOD PRESSURE < 130 MM HG: ICD-10-PCS | Mod: CPTII,S$GLB,, | Performed by: FAMILY MEDICINE

## 2021-01-25 RX ORDER — BUPROPION HYDROCHLORIDE 150 MG/1
150 TABLET ORAL EVERY MORNING
Qty: 90 TABLET | Refills: 3 | Status: SHIPPED | OUTPATIENT
Start: 2021-01-25 | End: 2021-02-06 | Stop reason: SDUPTHER

## 2021-01-25 RX ORDER — HYDROCHLOROTHIAZIDE 12.5 MG/1
12.5 TABLET ORAL DAILY
Qty: 90 TABLET | Refills: 3 | Status: SHIPPED | OUTPATIENT
Start: 2021-01-25 | End: 2021-12-14 | Stop reason: SDUPTHER

## 2021-01-26 ENCOUNTER — LAB VISIT (OUTPATIENT)
Dept: LAB | Facility: HOSPITAL | Age: 61
End: 2021-01-26
Attending: FAMILY MEDICINE
Payer: COMMERCIAL

## 2021-01-26 DIAGNOSIS — M19.049 ARTHRITIS OF HAND: ICD-10-CM

## 2021-01-26 DIAGNOSIS — D50.9 IRON DEFICIENCY ANEMIA, UNSPECIFIED IRON DEFICIENCY ANEMIA TYPE: ICD-10-CM

## 2021-01-26 DIAGNOSIS — R73.9 HYPERGLYCEMIA: ICD-10-CM

## 2021-01-26 DIAGNOSIS — E78.5 HYPERLIPIDEMIA, UNSPECIFIED HYPERLIPIDEMIA TYPE: ICD-10-CM

## 2021-01-26 DIAGNOSIS — Z12.5 PROSTATE CANCER SCREENING: ICD-10-CM

## 2021-01-26 LAB
ALBUMIN SERPL BCP-MCNC: 4.1 G/DL (ref 3.5–5.2)
ALP SERPL-CCNC: 72 U/L (ref 55–135)
ALT SERPL W/O P-5'-P-CCNC: 29 U/L (ref 10–44)
ANION GAP SERPL CALC-SCNC: 5 MMOL/L (ref 8–16)
AST SERPL-CCNC: 25 U/L (ref 10–40)
BASOPHILS # BLD AUTO: 0.03 K/UL (ref 0–0.2)
BASOPHILS NFR BLD: 0.6 % (ref 0–1.9)
BILIRUB SERPL-MCNC: 0.7 MG/DL (ref 0.1–1)
BUN SERPL-MCNC: 26 MG/DL (ref 8–23)
CALCIUM SERPL-MCNC: 9.7 MG/DL (ref 8.7–10.5)
CHLORIDE SERPL-SCNC: 102 MMOL/L (ref 95–110)
CHOLEST SERPL-MCNC: 151 MG/DL (ref 120–199)
CHOLEST/HDLC SERPL: 2.6 {RATIO} (ref 2–5)
CO2 SERPL-SCNC: 31 MMOL/L (ref 23–29)
COMPLEXED PSA SERPL-MCNC: 3.5 NG/ML (ref 0–4)
CREAT SERPL-MCNC: 1.2 MG/DL (ref 0.5–1.4)
CRP SERPL-MCNC: 0.6 MG/L (ref 0–8.2)
DIFFERENTIAL METHOD: ABNORMAL
EOSINOPHIL # BLD AUTO: 0.2 K/UL (ref 0–0.5)
EOSINOPHIL NFR BLD: 4.3 % (ref 0–8)
ERYTHROCYTE [DISTWIDTH] IN BLOOD BY AUTOMATED COUNT: 11.9 % (ref 11.5–14.5)
ERYTHROCYTE [SEDIMENTATION RATE] IN BLOOD BY WESTERGREN METHOD: 6 MM/HR (ref 0–10)
EST. GFR  (AFRICAN AMERICAN): >60 ML/MIN/1.73 M^2
EST. GFR  (NON AFRICAN AMERICAN): >60 ML/MIN/1.73 M^2
ESTIMATED AVG GLUCOSE: 108 MG/DL (ref 68–131)
FERRITIN SERPL-MCNC: 26 NG/ML (ref 20–300)
GLUCOSE SERPL-MCNC: 90 MG/DL (ref 70–110)
HBA1C MFR BLD HPLC: 5.4 % (ref 4–5.6)
HCT VFR BLD AUTO: 41.2 % (ref 40–54)
HDLC SERPL-MCNC: 58 MG/DL (ref 40–75)
HDLC SERPL: 38.4 % (ref 20–50)
HGB BLD-MCNC: 13.7 G/DL (ref 14–18)
IMM GRANULOCYTES # BLD AUTO: 0.01 K/UL (ref 0–0.04)
IMM GRANULOCYTES NFR BLD AUTO: 0.2 % (ref 0–0.5)
IRON SERPL-MCNC: 112 UG/DL (ref 45–160)
LDLC SERPL CALC-MCNC: 77 MG/DL (ref 63–159)
LYMPHOCYTES # BLD AUTO: 1.3 K/UL (ref 1–4.8)
LYMPHOCYTES NFR BLD: 28.8 % (ref 18–48)
MCH RBC QN AUTO: 31 PG (ref 27–31)
MCHC RBC AUTO-ENTMCNC: 33.3 G/DL (ref 32–36)
MCV RBC AUTO: 93 FL (ref 82–98)
MONOCYTES # BLD AUTO: 0.5 K/UL (ref 0.3–1)
MONOCYTES NFR BLD: 10.9 % (ref 4–15)
NEUTROPHILS # BLD AUTO: 2.6 K/UL (ref 1.8–7.7)
NEUTROPHILS NFR BLD: 55.2 % (ref 38–73)
NONHDLC SERPL-MCNC: 93 MG/DL
NRBC BLD-RTO: 0 /100 WBC
PLATELET # BLD AUTO: 216 K/UL (ref 150–350)
PMV BLD AUTO: 10.9 FL (ref 9.2–12.9)
POTASSIUM SERPL-SCNC: 4.2 MMOL/L (ref 3.5–5.1)
PROT SERPL-MCNC: 7.4 G/DL (ref 6–8.4)
RBC # BLD AUTO: 4.42 M/UL (ref 4.6–6.2)
SATURATED IRON: 25 % (ref 20–50)
SODIUM SERPL-SCNC: 138 MMOL/L (ref 136–145)
TOTAL IRON BINDING CAPACITY: 443 UG/DL (ref 250–450)
TRANSFERRIN SERPL-MCNC: 299 MG/DL (ref 200–375)
TRIGL SERPL-MCNC: 80 MG/DL (ref 30–150)
URATE SERPL-MCNC: 5.6 MG/DL (ref 3.4–7)
WBC # BLD AUTO: 4.66 K/UL (ref 3.9–12.7)

## 2021-01-26 PROCEDURE — 84153 ASSAY OF PSA TOTAL: CPT

## 2021-01-26 PROCEDURE — 83036 HEMOGLOBIN GLYCOSYLATED A1C: CPT

## 2021-01-26 PROCEDURE — 80061 LIPID PANEL: CPT

## 2021-01-26 PROCEDURE — 36415 COLL VENOUS BLD VENIPUNCTURE: CPT | Mod: PO

## 2021-01-26 PROCEDURE — 85651 RBC SED RATE NONAUTOMATED: CPT | Mod: PO

## 2021-01-26 PROCEDURE — 82728 ASSAY OF FERRITIN: CPT

## 2021-01-26 PROCEDURE — 83540 ASSAY OF IRON: CPT

## 2021-01-26 PROCEDURE — 80053 COMPREHEN METABOLIC PANEL: CPT

## 2021-01-26 PROCEDURE — 84550 ASSAY OF BLOOD/URIC ACID: CPT

## 2021-01-26 PROCEDURE — 86140 C-REACTIVE PROTEIN: CPT

## 2021-01-26 PROCEDURE — 85025 COMPLETE CBC W/AUTO DIFF WBC: CPT

## 2021-02-06 ENCOUNTER — PATIENT MESSAGE (OUTPATIENT)
Dept: FAMILY MEDICINE | Facility: CLINIC | Age: 61
End: 2021-02-06

## 2021-02-06 DIAGNOSIS — F41.9 ANXIETY: ICD-10-CM

## 2021-02-07 ENCOUNTER — PATIENT OUTREACH (OUTPATIENT)
Dept: ADMINISTRATIVE | Facility: OTHER | Age: 61
End: 2021-02-07

## 2021-02-08 RX ORDER — CITALOPRAM 20 MG/1
20 TABLET, FILM COATED ORAL DAILY
Qty: 90 TABLET | Refills: 3 | Status: SHIPPED | OUTPATIENT
Start: 2021-02-08 | End: 2022-02-19 | Stop reason: SDUPTHER

## 2021-02-10 ENCOUNTER — PATIENT MESSAGE (OUTPATIENT)
Dept: OPTOMETRY | Facility: CLINIC | Age: 61
End: 2021-02-10

## 2021-05-11 ENCOUNTER — PATIENT OUTREACH (OUTPATIENT)
Dept: ADMINISTRATIVE | Facility: OTHER | Age: 61
End: 2021-05-11

## 2021-05-12 ENCOUNTER — OFFICE VISIT (OUTPATIENT)
Dept: OPTOMETRY | Facility: CLINIC | Age: 61
End: 2021-05-12
Payer: COMMERCIAL

## 2021-05-12 DIAGNOSIS — H25.13 NUCLEAR SCLEROSIS OF BOTH EYES: ICD-10-CM

## 2021-05-12 DIAGNOSIS — H11.001 PTERYGIUM OF RIGHT EYE: ICD-10-CM

## 2021-05-12 DIAGNOSIS — H18.413 ARCUS SENILIS OF BOTH CORNEAS: ICD-10-CM

## 2021-05-12 DIAGNOSIS — H52.7 REFRACTIVE ERROR: ICD-10-CM

## 2021-05-12 DIAGNOSIS — Z01.00 EXAMINATION OF EYES AND VISION: Primary | ICD-10-CM

## 2021-05-12 PROCEDURE — 92015 PR REFRACTION: ICD-10-PCS | Mod: S$GLB,,, | Performed by: OPTOMETRIST

## 2021-05-12 PROCEDURE — 99999 PR PBB SHADOW E&M-EST. PATIENT-LVL III: CPT | Mod: PBBFAC,,, | Performed by: OPTOMETRIST

## 2021-05-12 PROCEDURE — 92015 DETERMINE REFRACTIVE STATE: CPT | Mod: S$GLB,,, | Performed by: OPTOMETRIST

## 2021-05-12 PROCEDURE — 99999 PR PBB SHADOW E&M-EST. PATIENT-LVL III: ICD-10-PCS | Mod: PBBFAC,,, | Performed by: OPTOMETRIST

## 2021-05-12 PROCEDURE — 92014 PR EYE EXAM, EST PATIENT,COMPREHESV: ICD-10-PCS | Mod: S$GLB,,, | Performed by: OPTOMETRIST

## 2021-05-12 PROCEDURE — 92014 COMPRE OPH EXAM EST PT 1/>: CPT | Mod: S$GLB,,, | Performed by: OPTOMETRIST

## 2021-07-22 DIAGNOSIS — E78.5 HYPERLIPIDEMIA, UNSPECIFIED HYPERLIPIDEMIA TYPE: ICD-10-CM

## 2021-07-22 RX ORDER — ATORVASTATIN CALCIUM 40 MG/1
40 TABLET, FILM COATED ORAL DAILY
Qty: 90 TABLET | Refills: 3 | Status: SHIPPED | OUTPATIENT
Start: 2021-07-22 | End: 2021-07-26 | Stop reason: SDUPTHER

## 2021-07-26 DIAGNOSIS — E78.5 HYPERLIPIDEMIA, UNSPECIFIED HYPERLIPIDEMIA TYPE: ICD-10-CM

## 2021-07-26 RX ORDER — ATORVASTATIN CALCIUM 40 MG/1
40 TABLET, FILM COATED ORAL DAILY
Qty: 90 TABLET | Refills: 3 | Status: SHIPPED | OUTPATIENT
Start: 2021-07-26 | End: 2021-07-28 | Stop reason: SDUPTHER

## 2021-07-28 ENCOUNTER — TELEPHONE (OUTPATIENT)
Dept: FAMILY MEDICINE | Facility: CLINIC | Age: 61
End: 2021-07-28

## 2021-07-28 DIAGNOSIS — E78.5 HYPERLIPIDEMIA, UNSPECIFIED HYPERLIPIDEMIA TYPE: ICD-10-CM

## 2021-07-28 RX ORDER — ATORVASTATIN CALCIUM 40 MG/1
40 TABLET, FILM COATED ORAL DAILY
Qty: 90 TABLET | Refills: 3 | Status: SHIPPED | OUTPATIENT
Start: 2021-07-28 | End: 2021-11-13 | Stop reason: SDUPTHER

## 2021-09-10 ENCOUNTER — TELEPHONE (OUTPATIENT)
Dept: FAMILY MEDICINE | Facility: CLINIC | Age: 61
End: 2021-09-10

## 2021-09-10 ENCOUNTER — CLINICAL SUPPORT (OUTPATIENT)
Dept: FAMILY MEDICINE | Facility: CLINIC | Age: 61
End: 2021-09-10
Payer: COMMERCIAL

## 2021-09-10 DIAGNOSIS — Z11.52 ENCOUNTER FOR SCREENING FOR COVID-19: ICD-10-CM

## 2021-09-10 DIAGNOSIS — Z11.52 ENCOUNTER FOR SCREENING FOR COVID-19: Primary | ICD-10-CM

## 2021-09-10 DIAGNOSIS — Z20.822 ENCOUNTER FOR LABORATORY TESTING FOR COVID-19 VIRUS: ICD-10-CM

## 2021-09-10 PROCEDURE — 99499 UNLISTED E&M SERVICE: CPT | Mod: S$GLB,,,

## 2021-09-10 PROCEDURE — 99499 NO LOS: ICD-10-PCS | Mod: S$GLB,,,

## 2021-09-10 PROCEDURE — U0003 INFECTIOUS AGENT DETECTION BY NUCLEIC ACID (DNA OR RNA); SEVERE ACUTE RESPIRATORY SYNDROME CORONAVIRUS 2 (SARS-COV-2) (CORONAVIRUS DISEASE [COVID-19]), AMPLIFIED PROBE TECHNIQUE, MAKING USE OF HIGH THROUGHPUT TECHNOLOGIES AS DESCRIBED BY CMS-2020-01-R: HCPCS

## 2021-09-10 PROCEDURE — U0005 INFEC AGEN DETEC AMPLI PROBE: HCPCS

## 2021-09-11 LAB — SARS-COV-2 RNA RESP QL NAA+PROBE: NOT DETECTED

## 2021-09-28 ENCOUNTER — IMMUNIZATION (OUTPATIENT)
Dept: PRIMARY CARE CLINIC | Facility: CLINIC | Age: 61
End: 2021-09-28
Payer: COMMERCIAL

## 2021-09-28 DIAGNOSIS — Z23 NEED FOR VACCINATION: Primary | ICD-10-CM

## 2021-09-28 PROCEDURE — 0003A COVID-19, MRNA, LNP-S, PF, 30 MCG/0.3 ML DOSE VACCINE: CPT | Mod: S$GLB,,, | Performed by: FAMILY MEDICINE

## 2021-09-28 PROCEDURE — 91300 COVID-19, MRNA, LNP-S, PF, 30 MCG/0.3 ML DOSE VACCINE: CPT | Mod: S$GLB,,, | Performed by: FAMILY MEDICINE

## 2021-09-28 PROCEDURE — 91300 COVID-19, MRNA, LNP-S, PF, 30 MCG/0.3 ML DOSE VACCINE: ICD-10-PCS | Mod: S$GLB,,, | Performed by: FAMILY MEDICINE

## 2021-09-28 PROCEDURE — 0003A COVID-19, MRNA, LNP-S, PF, 30 MCG/0.3 ML DOSE VACCINE: ICD-10-PCS | Mod: S$GLB,,, | Performed by: FAMILY MEDICINE

## 2021-10-20 ENCOUNTER — PATIENT MESSAGE (OUTPATIENT)
Dept: ADMINISTRATIVE | Facility: OTHER | Age: 61
End: 2021-10-20
Payer: COMMERCIAL

## 2021-10-20 ENCOUNTER — PATIENT MESSAGE (OUTPATIENT)
Dept: FAMILY MEDICINE | Facility: CLINIC | Age: 61
End: 2021-10-20

## 2021-10-20 DIAGNOSIS — I10 GOOD HYPERTENSION CONTROL: ICD-10-CM

## 2021-10-20 DIAGNOSIS — E78.5 HYPERLIPIDEMIA, UNSPECIFIED HYPERLIPIDEMIA TYPE: Primary | ICD-10-CM

## 2021-10-20 DIAGNOSIS — D50.9 IRON DEFICIENCY ANEMIA, UNSPECIFIED IRON DEFICIENCY ANEMIA TYPE: ICD-10-CM

## 2021-10-20 DIAGNOSIS — R73.9 HYPERGLYCEMIA: ICD-10-CM

## 2021-11-05 ENCOUNTER — LAB VISIT (OUTPATIENT)
Dept: LAB | Facility: HOSPITAL | Age: 61
End: 2021-11-05
Attending: FAMILY MEDICINE
Payer: COMMERCIAL

## 2021-11-05 DIAGNOSIS — R73.9 HYPERGLYCEMIA: ICD-10-CM

## 2021-11-05 DIAGNOSIS — E78.5 HYPERLIPIDEMIA, UNSPECIFIED HYPERLIPIDEMIA TYPE: ICD-10-CM

## 2021-11-05 DIAGNOSIS — D50.9 IRON DEFICIENCY ANEMIA, UNSPECIFIED IRON DEFICIENCY ANEMIA TYPE: ICD-10-CM

## 2021-11-05 LAB
ALBUMIN SERPL BCP-MCNC: 3.9 G/DL (ref 3.5–5.2)
ALP SERPL-CCNC: 77 U/L (ref 55–135)
ALT SERPL W/O P-5'-P-CCNC: 37 U/L (ref 10–44)
ANION GAP SERPL CALC-SCNC: 9 MMOL/L (ref 8–16)
AST SERPL-CCNC: 25 U/L (ref 10–40)
BASOPHILS # BLD AUTO: 0.05 K/UL (ref 0–0.2)
BASOPHILS NFR BLD: 0.9 % (ref 0–1.9)
BILIRUB SERPL-MCNC: 0.4 MG/DL (ref 0.1–1)
BUN SERPL-MCNC: 26 MG/DL (ref 8–23)
CALCIUM SERPL-MCNC: 9.7 MG/DL (ref 8.7–10.5)
CHLORIDE SERPL-SCNC: 100 MMOL/L (ref 95–110)
CHOLEST SERPL-MCNC: 201 MG/DL (ref 120–199)
CHOLEST/HDLC SERPL: 3.5 {RATIO} (ref 2–5)
CO2 SERPL-SCNC: 27 MMOL/L (ref 23–29)
CREAT SERPL-MCNC: 1.1 MG/DL (ref 0.5–1.4)
DIFFERENTIAL METHOD: ABNORMAL
EOSINOPHIL # BLD AUTO: 0.2 K/UL (ref 0–0.5)
EOSINOPHIL NFR BLD: 3.9 % (ref 0–8)
ERYTHROCYTE [DISTWIDTH] IN BLOOD BY AUTOMATED COUNT: 13.2 % (ref 11.5–14.5)
EST. GFR  (AFRICAN AMERICAN): >60 ML/MIN/1.73 M^2
EST. GFR  (NON AFRICAN AMERICAN): >60 ML/MIN/1.73 M^2
ESTIMATED AVG GLUCOSE: 108 MG/DL (ref 68–131)
GLUCOSE SERPL-MCNC: 98 MG/DL (ref 70–110)
HBA1C MFR BLD: 5.4 % (ref 4–5.6)
HCT VFR BLD AUTO: 42.1 % (ref 40–54)
HDLC SERPL-MCNC: 57 MG/DL (ref 40–75)
HDLC SERPL: 28.4 % (ref 20–50)
HGB BLD-MCNC: 13.5 G/DL (ref 14–18)
IMM GRANULOCYTES # BLD AUTO: 0.02 K/UL (ref 0–0.04)
IMM GRANULOCYTES NFR BLD AUTO: 0.4 % (ref 0–0.5)
LDLC SERPL CALC-MCNC: 128.8 MG/DL (ref 63–159)
LYMPHOCYTES # BLD AUTO: 1.6 K/UL (ref 1–4.8)
LYMPHOCYTES NFR BLD: 27.9 % (ref 18–48)
MCH RBC QN AUTO: 30.8 PG (ref 27–31)
MCHC RBC AUTO-ENTMCNC: 32.1 G/DL (ref 32–36)
MCV RBC AUTO: 96 FL (ref 82–98)
MONOCYTES # BLD AUTO: 0.5 K/UL (ref 0.3–1)
MONOCYTES NFR BLD: 9.6 % (ref 4–15)
NEUTROPHILS # BLD AUTO: 3.2 K/UL (ref 1.8–7.7)
NEUTROPHILS NFR BLD: 57.3 % (ref 38–73)
NONHDLC SERPL-MCNC: 144 MG/DL
NRBC BLD-RTO: 0 /100 WBC
PLATELET # BLD AUTO: 214 K/UL (ref 150–450)
PMV BLD AUTO: 10.4 FL (ref 9.2–12.9)
POTASSIUM SERPL-SCNC: 4.7 MMOL/L (ref 3.5–5.1)
PROT SERPL-MCNC: 7.5 G/DL (ref 6–8.4)
RBC # BLD AUTO: 4.38 M/UL (ref 4.6–6.2)
SODIUM SERPL-SCNC: 136 MMOL/L (ref 136–145)
TRIGL SERPL-MCNC: 76 MG/DL (ref 30–150)
WBC # BLD AUTO: 5.6 K/UL (ref 3.9–12.7)

## 2021-11-05 PROCEDURE — 85025 COMPLETE CBC W/AUTO DIFF WBC: CPT | Performed by: FAMILY MEDICINE

## 2021-11-05 PROCEDURE — 36415 COLL VENOUS BLD VENIPUNCTURE: CPT | Mod: PO | Performed by: FAMILY MEDICINE

## 2021-11-05 PROCEDURE — 80053 COMPREHEN METABOLIC PANEL: CPT | Performed by: FAMILY MEDICINE

## 2021-11-05 PROCEDURE — 83036 HEMOGLOBIN GLYCOSYLATED A1C: CPT | Performed by: FAMILY MEDICINE

## 2021-11-05 PROCEDURE — 80061 LIPID PANEL: CPT | Performed by: FAMILY MEDICINE

## 2021-11-13 ENCOUNTER — PATIENT MESSAGE (OUTPATIENT)
Dept: FAMILY MEDICINE | Facility: CLINIC | Age: 61
End: 2021-11-13
Payer: COMMERCIAL

## 2021-12-08 ENCOUNTER — PATIENT MESSAGE (OUTPATIENT)
Dept: FAMILY MEDICINE | Facility: CLINIC | Age: 61
End: 2021-12-08
Payer: COMMERCIAL

## 2021-12-14 DIAGNOSIS — I10 GOOD HYPERTENSION CONTROL: ICD-10-CM

## 2021-12-14 RX ORDER — HYDROCHLOROTHIAZIDE 12.5 MG/1
12.5 TABLET ORAL DAILY
Qty: 90 TABLET | Refills: 3 | Status: SHIPPED | OUTPATIENT
Start: 2021-12-14 | End: 2022-04-05

## 2021-12-27 ENCOUNTER — PATIENT MESSAGE (OUTPATIENT)
Dept: ADMINISTRATIVE | Facility: OTHER | Age: 61
End: 2021-12-27
Payer: COMMERCIAL

## 2021-12-27 ENCOUNTER — PATIENT MESSAGE (OUTPATIENT)
Dept: FAMILY MEDICINE | Facility: CLINIC | Age: 61
End: 2021-12-27
Payer: COMMERCIAL

## 2021-12-27 DIAGNOSIS — R05.9 COUGH: Primary | ICD-10-CM

## 2021-12-29 ENCOUNTER — LAB VISIT (OUTPATIENT)
Dept: PRIMARY CARE CLINIC | Facility: OTHER | Age: 61
End: 2021-12-29
Attending: INTERNAL MEDICINE
Payer: COMMERCIAL

## 2021-12-29 ENCOUNTER — PATIENT MESSAGE (OUTPATIENT)
Dept: ADMINISTRATIVE | Facility: OTHER | Age: 61
End: 2021-12-29
Payer: COMMERCIAL

## 2021-12-29 DIAGNOSIS — Z20.822 ENCOUNTER FOR LABORATORY TESTING FOR COVID-19 VIRUS: ICD-10-CM

## 2021-12-29 PROCEDURE — U0003 INFECTIOUS AGENT DETECTION BY NUCLEIC ACID (DNA OR RNA); SEVERE ACUTE RESPIRATORY SYNDROME CORONAVIRUS 2 (SARS-COV-2) (CORONAVIRUS DISEASE [COVID-19]), AMPLIFIED PROBE TECHNIQUE, MAKING USE OF HIGH THROUGHPUT TECHNOLOGIES AS DESCRIBED BY CMS-2020-01-R: HCPCS | Performed by: INTERNAL MEDICINE

## 2021-12-30 ENCOUNTER — TELEPHONE (OUTPATIENT)
Dept: FAMILY MEDICINE | Facility: CLINIC | Age: 61
End: 2021-12-30
Payer: COMMERCIAL

## 2021-12-30 DIAGNOSIS — J06.9 UPPER RESPIRATORY TRACT INFECTION, UNSPECIFIED TYPE: Primary | ICD-10-CM

## 2021-12-30 DIAGNOSIS — R06.2 WHEEZING: ICD-10-CM

## 2021-12-30 RX ORDER — AZITHROMYCIN 250 MG/1
TABLET, FILM COATED ORAL
Qty: 6 TABLET | Refills: 0 | Status: SHIPPED | OUTPATIENT
Start: 2021-12-30 | End: 2022-01-05

## 2021-12-30 RX ORDER — DEXAMETHASONE 4 MG/1
4 TABLET ORAL EVERY 12 HOURS
Qty: 6 TABLET | Refills: 0 | Status: SHIPPED | OUTPATIENT
Start: 2021-12-30 | End: 2022-01-02

## 2021-12-31 LAB
SARS-COV-2 RNA RESP QL NAA+PROBE: NOT DETECTED
SARS-COV-2- CYCLE NUMBER: NORMAL

## 2022-01-01 ENCOUNTER — PATIENT MESSAGE (OUTPATIENT)
Dept: ADMINISTRATIVE | Facility: OTHER | Age: 62
End: 2022-01-01
Payer: COMMERCIAL

## 2022-01-05 ENCOUNTER — HOSPITAL ENCOUNTER (OUTPATIENT)
Dept: RADIOLOGY | Facility: CLINIC | Age: 62
Discharge: HOME | End: 2022-01-05
Attending: FAMILY MEDICINE
Payer: COMMERCIAL

## 2022-01-05 ENCOUNTER — OFFICE VISIT (OUTPATIENT)
Dept: FAMILY MEDICINE | Facility: CLINIC | Age: 62
End: 2022-01-05
Payer: COMMERCIAL

## 2022-01-05 VITALS
BODY MASS INDEX: 29.27 KG/M2 | RESPIRATION RATE: 18 BRPM | DIASTOLIC BLOOD PRESSURE: 60 MMHG | SYSTOLIC BLOOD PRESSURE: 112 MMHG | OXYGEN SATURATION: 98 % | HEIGHT: 67 IN | HEART RATE: 64 BPM | WEIGHT: 186.5 LBS | TEMPERATURE: 98 F

## 2022-01-05 DIAGNOSIS — R80.9 POSITIVE FOR MICROALBUMINURIA: ICD-10-CM

## 2022-01-05 DIAGNOSIS — Z12.5 PROSTATE CANCER SCREENING: ICD-10-CM

## 2022-01-05 DIAGNOSIS — R05.9 COUGH: ICD-10-CM

## 2022-01-05 DIAGNOSIS — E78.5 HYPERLIPIDEMIA, UNSPECIFIED HYPERLIPIDEMIA TYPE: ICD-10-CM

## 2022-01-05 DIAGNOSIS — I10 GOOD HYPERTENSION CONTROL: Primary | ICD-10-CM

## 2022-01-05 DIAGNOSIS — J45.990 EXERCISE-INDUCED ASTHMA: ICD-10-CM

## 2022-01-05 DIAGNOSIS — R73.9 HYPERGLYCEMIA: ICD-10-CM

## 2022-01-05 DIAGNOSIS — D50.9 IRON DEFICIENCY ANEMIA, UNSPECIFIED IRON DEFICIENCY ANEMIA TYPE: ICD-10-CM

## 2022-01-05 PROCEDURE — 3078F PR MOST RECENT DIASTOLIC BLOOD PRESSURE < 80 MM HG: ICD-10-PCS | Mod: CPTII,S$GLB,, | Performed by: FAMILY MEDICINE

## 2022-01-05 PROCEDURE — 1159F MED LIST DOCD IN RCRD: CPT | Mod: CPTII,S$GLB,, | Performed by: FAMILY MEDICINE

## 2022-01-05 PROCEDURE — 3008F PR BODY MASS INDEX (BMI) DOCUMENTED: ICD-10-PCS | Mod: CPTII,S$GLB,, | Performed by: FAMILY MEDICINE

## 2022-01-05 PROCEDURE — 1160F PR REVIEW ALL MEDS BY PRESCRIBER/CLIN PHARMACIST DOCUMENTED: ICD-10-PCS | Mod: CPTII,S$GLB,, | Performed by: FAMILY MEDICINE

## 2022-01-05 PROCEDURE — 71046 XR CHEST PA AND LATERAL: ICD-10-PCS | Mod: 26,,, | Performed by: RADIOLOGY

## 2022-01-05 PROCEDURE — 99214 PR OFFICE/OUTPT VISIT, EST, LEVL IV, 30-39 MIN: ICD-10-PCS | Mod: S$GLB,,, | Performed by: FAMILY MEDICINE

## 2022-01-05 PROCEDURE — 3078F DIAST BP <80 MM HG: CPT | Mod: CPTII,S$GLB,, | Performed by: FAMILY MEDICINE

## 2022-01-05 PROCEDURE — 1160F RVW MEDS BY RX/DR IN RCRD: CPT | Mod: CPTII,S$GLB,, | Performed by: FAMILY MEDICINE

## 2022-01-05 PROCEDURE — 3074F SYST BP LT 130 MM HG: CPT | Mod: CPTII,S$GLB,, | Performed by: FAMILY MEDICINE

## 2022-01-05 PROCEDURE — 99214 OFFICE O/P EST MOD 30 MIN: CPT | Mod: S$GLB,,, | Performed by: FAMILY MEDICINE

## 2022-01-05 PROCEDURE — 71046 X-RAY EXAM CHEST 2 VIEWS: CPT | Mod: TC,FY,PO

## 2022-01-05 PROCEDURE — 99999 PR PBB SHADOW E&M-EST. PATIENT-LVL III: ICD-10-PCS | Mod: PBBFAC,,, | Performed by: FAMILY MEDICINE

## 2022-01-05 PROCEDURE — 3074F PR MOST RECENT SYSTOLIC BLOOD PRESSURE < 130 MM HG: ICD-10-PCS | Mod: CPTII,S$GLB,, | Performed by: FAMILY MEDICINE

## 2022-01-05 PROCEDURE — 1159F PR MEDICATION LIST DOCUMENTED IN MEDICAL RECORD: ICD-10-PCS | Mod: CPTII,S$GLB,, | Performed by: FAMILY MEDICINE

## 2022-01-05 PROCEDURE — 99999 PR PBB SHADOW E&M-EST. PATIENT-LVL III: CPT | Mod: PBBFAC,,, | Performed by: FAMILY MEDICINE

## 2022-01-05 PROCEDURE — 3008F BODY MASS INDEX DOCD: CPT | Mod: CPTII,S$GLB,, | Performed by: FAMILY MEDICINE

## 2022-01-05 PROCEDURE — 71046 X-RAY EXAM CHEST 2 VIEWS: CPT | Mod: 26,,, | Performed by: RADIOLOGY

## 2022-01-05 NOTE — PROGRESS NOTES
Subjective:       Patient ID: Parth Juarez is a 61 y.o. male.    Chief Complaint: Follow-up (6mth f/u )    HPI  Review of Systems   Constitutional: Negative for fatigue and unexpected weight change.   Respiratory: Negative for chest tightness and shortness of breath.    Cardiovascular: Negative for chest pain, palpitations and leg swelling.   Gastrointestinal: Negative for abdominal pain.   Musculoskeletal: Negative for arthralgias.   Neurological: Negative for dizziness, syncope, light-headedness and headaches.       Patient Active Problem List   Diagnosis    Hyperlipidemia    Good hypertension control    Exercise-induced asthma    Positive for microalbuminuria    Hyperglycemia    Anemia    NICCI (iron deficiency anemia)    ADHD (attention deficit hyperactivity disorder), combined type    Anxiety     Patient is here for a chronic conditions follow up.    Reviewed labs 11/2021  A1c 5.4   Lipids      Urology Dr. Gibbs monitoring for mild LUTS due to BPH .  Has occ urgency and mild urge incontinence. Feels he empties well. Urinates once at night. Nl stream. Nl hesitancy        HTN- BP has been low 100s/60s. HCTZ decreased to 12.5 mg   Objective:      Physical Exam  Vitals and nursing note reviewed.   Constitutional:       Appearance: He is well-developed and well-nourished.   Cardiovascular:      Rate and Rhythm: Normal rate and regular rhythm.      Heart sounds: Normal heart sounds.   Pulmonary:      Effort: Pulmonary effort is normal.      Breath sounds: Normal breath sounds.   Musculoskeletal:         General: No edema.   Skin:     General: Skin is warm and dry.   Neurological:      Mental Status: He is alert and oriented to person, place, and time.   Psychiatric:         Mood and Affect: Mood and affect normal.         Assessment:       1. Good hypertension control    2. Hyperlipidemia, unspecified hyperlipidemia type    3. Positive for microalbuminuria    4. Iron deficiency anemia, unspecified iron  deficiency anemia type    5. Hyperglycemia    6. Exercise-induced asthma    7. Prostate cancer screening    8. Cough        Plan:         1. Good hypertension control  Controlled on current medications.  Continue current medications.      2. Hyperlipidemia, unspecified hyperlipidemia type  Controlled on current medications.  Continue current medications.    - Comprehensive Metabolic Panel; Future  - Lipid Panel; Future    3. Positive for microalbuminuria  Screen and treat as indicated:  Cont arb  - Microalbumin/Creatinine Ratio, Urine; Future    4. Iron deficiency anemia, unspecified iron deficiency anemia type  Screen and treat as indicated:    - CBC Auto Differential; Future    5. Hyperglycemia  Controlled with diet. monitor  - Hemoglobin A1C; Future    6. Exercise-induced asthma  Controlled on current medications.  Continue current medications.      7. Prostate cancer screening  Discussed benefits, risks and limitations of PSA testing and current USPSTF guidelines.  Patient expressed verbal understanding and wished to pursue screening.    - PSA, Screening; Future    8. Cough  General home care guidelines for cough and congestion:increase fluid intake, get plenty of rest, and use OTC cough and cold medications for relief of symptoms.  I recommended guafenesin for congestion and dextromethorphan as directed for cough.  Brands like Mucinex DM or Chloricidin HBP or similar are recommended.  Avoidance of decongestants is recommended for patients with heart problems and hypertension.  Extra vitamin C may also benefit.  Return to clinic if symptoms last longer than 10 days or sooner if worsen with symptoms like fever > 100.4, severe sinus pain or headache, thick yellow nasal discharge or sputum, dehydration, sudden confusion or mental status changes, shortness of breath, labored breathing, or lethargy. Anti-fever medications can be alternated like OTC ibuprofen or tylenol as needed and as directed unless told to avoid  these by your doctor.   resolving bronchitis  - X-Ray Chest PA And Lateral; Future        Time spent with patient: 20 minutes    Patient with be reevaluated in 6 months or sooner prn    Greater than 50% of this visit was spent counseling as described in above documentation:Yes

## 2022-01-13 ENCOUNTER — LAB VISIT (OUTPATIENT)
Dept: PRIMARY CARE CLINIC | Facility: OTHER | Age: 62
End: 2022-01-13
Attending: INTERNAL MEDICINE
Payer: COMMERCIAL

## 2022-01-13 DIAGNOSIS — Z20.822 ENCOUNTER FOR LABORATORY TESTING FOR COVID-19 VIRUS: ICD-10-CM

## 2022-01-13 PROCEDURE — U0003 INFECTIOUS AGENT DETECTION BY NUCLEIC ACID (DNA OR RNA); SEVERE ACUTE RESPIRATORY SYNDROME CORONAVIRUS 2 (SARS-COV-2) (CORONAVIRUS DISEASE [COVID-19]), AMPLIFIED PROBE TECHNIQUE, MAKING USE OF HIGH THROUGHPUT TECHNOLOGIES AS DESCRIBED BY CMS-2020-01-R: HCPCS | Performed by: INTERNAL MEDICINE

## 2022-01-14 ENCOUNTER — OFFICE VISIT (OUTPATIENT)
Dept: FAMILY MEDICINE | Facility: CLINIC | Age: 62
End: 2022-01-14
Payer: COMMERCIAL

## 2022-01-14 DIAGNOSIS — R09.81 SINUS CONGESTION: ICD-10-CM

## 2022-01-14 DIAGNOSIS — H65.191 OTHER NON-RECURRENT ACUTE NONSUPPURATIVE OTITIS MEDIA OF RIGHT EAR: Primary | ICD-10-CM

## 2022-01-14 LAB
SARS-COV-2 RNA RESP QL NAA+PROBE: NOT DETECTED
SARS-COV-2- CYCLE NUMBER: NORMAL

## 2022-01-14 PROCEDURE — 1159F MED LIST DOCD IN RCRD: CPT | Mod: CPTII,S$GLB,,

## 2022-01-14 PROCEDURE — 99213 PR OFFICE/OUTPT VISIT, EST, LEVL III, 20-29 MIN: ICD-10-PCS | Mod: S$GLB,,,

## 2022-01-14 PROCEDURE — 1159F PR MEDICATION LIST DOCUMENTED IN MEDICAL RECORD: ICD-10-PCS | Mod: CPTII,S$GLB,,

## 2022-01-14 PROCEDURE — 99999 PR PBB SHADOW E&M-EST. PATIENT-LVL III: ICD-10-PCS | Mod: PBBFAC,,,

## 2022-01-14 PROCEDURE — 99213 OFFICE O/P EST LOW 20 MIN: CPT | Mod: S$GLB,,,

## 2022-01-14 PROCEDURE — 1160F PR REVIEW ALL MEDS BY PRESCRIBER/CLIN PHARMACIST DOCUMENTED: ICD-10-PCS | Mod: CPTII,S$GLB,,

## 2022-01-14 PROCEDURE — 99999 PR PBB SHADOW E&M-EST. PATIENT-LVL III: CPT | Mod: PBBFAC,,,

## 2022-01-14 PROCEDURE — 1160F RVW MEDS BY RX/DR IN RCRD: CPT | Mod: CPTII,S$GLB,,

## 2022-01-14 RX ORDER — AMOXICILLIN AND CLAVULANATE POTASSIUM 875; 125 MG/1; MG/1
1 TABLET, FILM COATED ORAL EVERY 12 HOURS
Qty: 14 TABLET | Refills: 0 | Status: SHIPPED | OUTPATIENT
Start: 2022-01-14 | End: 2022-01-21

## 2022-01-14 NOTE — PROGRESS NOTES
Subjective:       Patient ID: Parth Juarez is a 61 y.o. male.    Chief Complaint: No chief complaint on file.    Parth Juarez is a 60 yo M pt w/ MHx listed below that presents to the clinic w/ complaints of sinus congestion, R otalgia and changes in hearing of the R ear. His symptoms began over the last week. He has noticed that bone conduction has been greater than air conduction while washing his hair. He states that it feels like his R ear is blocked.       Past Medical History:   Diagnosis Date    ADD (attention deficit disorder)     Asthma     exercise induced    BPH (benign prostatic hyperplasia)     Elevated PSA     Hyperlipidemia     Hypertension        Review of patient's allergies indicates:   Allergen Reactions    No known drug allergies          Current Outpatient Medications:     albuterol (PROVENTIL/VENTOLIN HFA) 90 mcg/actuation inhaler, Inhale 2 puffs into the lungs every 6 (six) hours as needed for Wheezing., Disp: 18 g, Rfl: 2    ascorbic acid, vitamin C, (VITAMIN C) 500 MG tablet, Take 500 mg by mouth once daily., Disp: , Rfl:     aspirin 81 MG Chew, Take 81 mg by mouth once daily., Disp: , Rfl:     atorvastatin (LIPITOR) 40 MG tablet, Take 1 tablet (40 mg total) by mouth once daily., Disp: 90 tablet, Rfl: 3    beclomethasone (QVAR) 40 mcg/actuation Aero, Inhale 1 puff into the lungs. Controller, Disp: , Rfl:     buPROPion (WELLBUTRIN XL) 150 MG TB24 tablet, Take 1 tablet (150 mg total) by mouth every morning., Disp: 90 tablet, Rfl: 3    citalopram (CELEXA) 20 MG tablet, Take 1 tablet (20 mg total) by mouth once daily., Disp: 90 tablet, Rfl: 3    fluticasone propionate (FLONASE) 50 mcg/actuation nasal spray, 1 spray by Each Nare route daily as needed for Rhinitis., Disp: , Rfl:     FOLIC ACID/MULTIVIT-MIN/LUTEIN (CENTRUM SILVER ORAL), Take 1 tablet by mouth once daily., Disp: , Rfl:     hydroCHLOROthiazide (HYDRODIURIL) 12.5 MG Tab, Take 1 tablet (12.5 mg total) by mouth once  daily., Disp: 90 tablet, Rfl: 3    inhalation device (AEROCHAMBER PLUS FLOW-VU), Use as directed for inhalation., Disp: 1 Device, Rfl: 0    losartan (COZAAR) 100 MG tablet, Take 1 tablet (100 mg total) by mouth once daily., Disp: 90 tablet, Rfl: 3    metoprolol succinate (TOPROL-XL) 25 MG 24 hr tablet, Take 1 tablet (25 mg total) by mouth once daily., Disp: 90 tablet, Rfl: 3    OMEGA-3S/DHA/EPA/FISH OIL (OMEGA 3 ORAL), Take 4 capsules by mouth once daily. , Disp: , Rfl:     psyllium 0.52 gram capsule, Take 4 capsules by mouth once daily. , Disp: , Rfl:     Review of Systems   Constitutional: Negative for activity change, appetite change, chills, diaphoresis, fatigue, fever and unexpected weight change.   HENT: Positive for congestion, ear pain, hearing loss and sinus pressure. Negative for ear discharge, postnasal drip, rhinorrhea, sneezing, sore throat and trouble swallowing.    Eyes: Negative for pain, itching and visual disturbance.   Respiratory: Negative for cough, chest tightness, shortness of breath and wheezing.    Cardiovascular: Negative for chest pain, palpitations and leg swelling.   Gastrointestinal: Negative for abdominal distention, abdominal pain, blood in stool, constipation, diarrhea, nausea and vomiting.   Endocrine: Negative for cold intolerance and heat intolerance.   Genitourinary: Negative for difficulty urinating, dysuria, frequency, hematuria and urgency.   Musculoskeletal: Negative for arthralgias, back pain, myalgias and neck pain.   Skin: Negative for color change, pallor, rash and wound.   Neurological: Negative for dizziness, syncope, weakness, numbness and headaches.   Hematological: Negative for adenopathy.   Psychiatric/Behavioral: Negative for behavioral problems. The patient is not nervous/anxious.        Objective:      There were no vitals taken for this visit.  Physical Exam  Vitals reviewed.   Constitutional:       General: He is not in acute distress.     Appearance:  Normal appearance. He is normal weight. He is not ill-appearing, toxic-appearing or diaphoretic.   HENT:      Head: Normocephalic.      Right Ear: External ear normal.      Left Ear: External ear normal.      Ears:      Comments: Cerumen present bilaterally. R ear inflamed canal and TM. Dullness of TM.      Nose: Congestion and rhinorrhea present.   Eyes:      General: No scleral icterus.        Right eye: No discharge.         Left eye: No discharge.      Extraocular Movements: Extraocular movements intact.      Conjunctiva/sclera: Conjunctivae normal.   Pulmonary:      Effort: Pulmonary effort is normal.      Breath sounds: No stridor.   Abdominal:      General: There is no distension.   Musculoskeletal:         General: Normal range of motion.      Cervical back: Normal range of motion.   Skin:     General: Skin is warm and dry.      Capillary Refill: Capillary refill takes less than 2 seconds.      Coloration: Skin is not jaundiced.      Findings: No bruising, erythema, lesion or rash.   Neurological:      Mental Status: He is alert and oriented to person, place, and time.      Gait: Gait normal.   Psychiatric:         Mood and Affect: Mood normal.         Behavior: Behavior normal.         Thought Content: Thought content normal.         Judgment: Judgment normal.         Assessment:       1. Other non-recurrent acute nonsuppurative otitis media of right ear    2. Sinus congestion        Plan:       Other non-recurrent acute nonsuppurative otitis media of right ear  -     amoxicillin-clavulanate 875-125mg (AUGMENTIN) 875-125 mg per tablet; Take 1 tablet by mouth every 12 (twelve) hours. for 7 days  Dispense: 14 tablet; Refill: 0    Sinus congestion  -     amoxicillin-clavulanate 875-125mg (AUGMENTIN) 875-125 mg per tablet; Take 1 tablet by mouth every 12 (twelve) hours. for 7 days  Dispense: 14 tablet; Refill: 0          Pt scheduled for routine follow up w/ Dr. Styles in July.        Scottei Carpio,  JERMAIN  Family Medicine Physician Assistant       I spent a total of 20 minutes on the day of the visit.This includes face to face time and non-face to face time preparing to see the patient (eg, review of tests), obtaining and/or reviewing separately obtained history, documenting clinical information in the electronic or other health record, independently interpreting results and communicating results to the patient/family/caregiver, or care coordinator.      We have addressed [3] Low: 2 or more self-limited or minor problems / 1 stable chronic illness / 1 acute, uncomplicated illness or injury  The complexity of the data reviewed and analyzed for this visit was [3] Limited (Reviewed prior external note, ordered unique testing or reviewed the results of each unique test)   The risk of complications and/or morbidity or mortality are [4] Moderate risk (I.e. prescription drug management / decision regarding minor surgery with identified pt or procedure risk factors / decision regarding elective major surgery without identified pt or procedure risk factors / diagnosis or treatment significantly limited by social determinants of health)   The level of Medical Decision Making for this visit is [3] Low

## 2022-02-04 ENCOUNTER — LAB VISIT (OUTPATIENT)
Dept: LAB | Facility: HOSPITAL | Age: 62
End: 2022-02-04
Attending: FAMILY MEDICINE
Payer: COMMERCIAL

## 2022-02-04 DIAGNOSIS — R73.9 HYPERGLYCEMIA: ICD-10-CM

## 2022-02-04 DIAGNOSIS — E78.5 HYPERLIPIDEMIA, UNSPECIFIED HYPERLIPIDEMIA TYPE: ICD-10-CM

## 2022-02-04 DIAGNOSIS — D50.9 IRON DEFICIENCY ANEMIA, UNSPECIFIED IRON DEFICIENCY ANEMIA TYPE: ICD-10-CM

## 2022-02-04 DIAGNOSIS — E78.49 OTHER HYPERLIPIDEMIA: ICD-10-CM

## 2022-02-04 DIAGNOSIS — Z12.5 PROSTATE CANCER SCREENING: ICD-10-CM

## 2022-02-04 LAB
ALBUMIN SERPL BCP-MCNC: 4.3 G/DL (ref 3.5–5.2)
ALP SERPL-CCNC: 77 U/L (ref 55–135)
ALT SERPL W/O P-5'-P-CCNC: 31 U/L (ref 10–44)
ANION GAP SERPL CALC-SCNC: 9 MMOL/L (ref 8–16)
AST SERPL-CCNC: 26 U/L (ref 10–40)
BASOPHILS # BLD AUTO: 0.04 K/UL (ref 0–0.2)
BASOPHILS NFR BLD: 0.8 % (ref 0–1.9)
BILIRUB SERPL-MCNC: 0.8 MG/DL (ref 0.1–1)
BUN SERPL-MCNC: 18 MG/DL (ref 8–23)
CALCIUM SERPL-MCNC: 10.4 MG/DL (ref 8.7–10.5)
CHLORIDE SERPL-SCNC: 97 MMOL/L (ref 95–110)
CHOLEST SERPL-MCNC: 164 MG/DL (ref 120–199)
CHOLEST/HDLC SERPL: 2.9 {RATIO} (ref 2–5)
CO2 SERPL-SCNC: 29 MMOL/L (ref 23–29)
COMPLEXED PSA SERPL-MCNC: 6.3 NG/ML (ref 0–4)
CREAT SERPL-MCNC: 0.9 MG/DL (ref 0.5–1.4)
DIFFERENTIAL METHOD: ABNORMAL
EOSINOPHIL # BLD AUTO: 0.2 K/UL (ref 0–0.5)
EOSINOPHIL NFR BLD: 3.4 % (ref 0–8)
ERYTHROCYTE [DISTWIDTH] IN BLOOD BY AUTOMATED COUNT: 13.1 % (ref 11.5–14.5)
EST. GFR  (AFRICAN AMERICAN): >60 ML/MIN/1.73 M^2
EST. GFR  (NON AFRICAN AMERICAN): >60 ML/MIN/1.73 M^2
ESTIMATED AVG GLUCOSE: 114 MG/DL (ref 68–131)
GLUCOSE SERPL-MCNC: 80 MG/DL (ref 70–110)
HBA1C MFR BLD: 5.6 % (ref 4–5.6)
HCT VFR BLD AUTO: 46 % (ref 40–54)
HDLC SERPL-MCNC: 56 MG/DL (ref 40–75)
HDLC SERPL: 34.1 % (ref 20–50)
HGB BLD-MCNC: 14.4 G/DL (ref 14–18)
IMM GRANULOCYTES # BLD AUTO: 0.01 K/UL (ref 0–0.04)
IMM GRANULOCYTES NFR BLD AUTO: 0.2 % (ref 0–0.5)
LDLC SERPL CALC-MCNC: 91.8 MG/DL (ref 63–159)
LYMPHOCYTES # BLD AUTO: 1.5 K/UL (ref 1–4.8)
LYMPHOCYTES NFR BLD: 29.5 % (ref 18–48)
MCH RBC QN AUTO: 29.1 PG (ref 27–31)
MCHC RBC AUTO-ENTMCNC: 31.3 G/DL (ref 32–36)
MCV RBC AUTO: 93 FL (ref 82–98)
MONOCYTES # BLD AUTO: 0.5 K/UL (ref 0.3–1)
MONOCYTES NFR BLD: 9.5 % (ref 4–15)
NEUTROPHILS # BLD AUTO: 2.9 K/UL (ref 1.8–7.7)
NEUTROPHILS NFR BLD: 56.6 % (ref 38–73)
NONHDLC SERPL-MCNC: 108 MG/DL
NRBC BLD-RTO: 0 /100 WBC
PLATELET # BLD AUTO: 230 K/UL (ref 150–450)
PMV BLD AUTO: 10.8 FL (ref 9.2–12.9)
POTASSIUM SERPL-SCNC: 4.1 MMOL/L (ref 3.5–5.1)
PROT SERPL-MCNC: 8 G/DL (ref 6–8.4)
RBC # BLD AUTO: 4.94 M/UL (ref 4.6–6.2)
SODIUM SERPL-SCNC: 135 MMOL/L (ref 136–145)
TRIGL SERPL-MCNC: 81 MG/DL (ref 30–150)
TSH SERPL DL<=0.005 MIU/L-ACNC: 1.49 UIU/ML (ref 0.4–4)
WBC # BLD AUTO: 5.05 K/UL (ref 3.9–12.7)

## 2022-02-04 PROCEDURE — 84153 ASSAY OF PSA TOTAL: CPT | Performed by: FAMILY MEDICINE

## 2022-02-04 PROCEDURE — 83036 HEMOGLOBIN GLYCOSYLATED A1C: CPT | Performed by: FAMILY MEDICINE

## 2022-02-04 PROCEDURE — 36415 COLL VENOUS BLD VENIPUNCTURE: CPT | Mod: PO | Performed by: FAMILY MEDICINE

## 2022-02-04 PROCEDURE — 80053 COMPREHEN METABOLIC PANEL: CPT | Performed by: FAMILY MEDICINE

## 2022-02-04 PROCEDURE — 84443 ASSAY THYROID STIM HORMONE: CPT | Performed by: FAMILY MEDICINE

## 2022-02-04 PROCEDURE — 80061 LIPID PANEL: CPT | Performed by: FAMILY MEDICINE

## 2022-02-04 PROCEDURE — 85025 COMPLETE CBC W/AUTO DIFF WBC: CPT | Performed by: FAMILY MEDICINE

## 2022-02-06 ENCOUNTER — PATIENT MESSAGE (OUTPATIENT)
Dept: UROLOGY | Facility: HOSPITAL | Age: 62
End: 2022-02-06
Payer: COMMERCIAL

## 2022-02-06 DIAGNOSIS — R97.20 ELEVATED PSA: Primary | ICD-10-CM

## 2022-02-21 ENCOUNTER — OFFICE VISIT (OUTPATIENT)
Dept: UROLOGY | Facility: CLINIC | Age: 62
End: 2022-02-21
Payer: COMMERCIAL

## 2022-02-21 VITALS
DIASTOLIC BLOOD PRESSURE: 84 MMHG | SYSTOLIC BLOOD PRESSURE: 134 MMHG | WEIGHT: 180.75 LBS | RESPIRATION RATE: 18 BRPM | HEIGHT: 67 IN | HEART RATE: 57 BPM | BODY MASS INDEX: 28.37 KG/M2

## 2022-02-21 DIAGNOSIS — N13.8 BPH WITH OBSTRUCTION/LOWER URINARY TRACT SYMPTOMS: ICD-10-CM

## 2022-02-21 DIAGNOSIS — Z01.818 PRE-OP TESTING: ICD-10-CM

## 2022-02-21 DIAGNOSIS — N40.1 BPH WITH OBSTRUCTION/LOWER URINARY TRACT SYMPTOMS: ICD-10-CM

## 2022-02-21 DIAGNOSIS — R97.20 ELEVATED PSA: Primary | ICD-10-CM

## 2022-02-21 LAB
BILIRUB SERPL-MCNC: NORMAL MG/DL
BLOOD URINE, POC: NORMAL
CLARITY, POC UA: CLEAR
COLOR, POC UA: YELLOW
GLUCOSE UR QL STRIP: NORMAL
KETONES UR QL STRIP: NORMAL
LEUKOCYTE ESTERASE URINE, POC: NORMAL
NITRITE, POC UA: NORMAL
PH, POC UA: 6
POC RESIDUAL URINE VOLUME: 87 ML (ref 0–100)
PROTEIN, POC: NORMAL
SPECIFIC GRAVITY, POC UA: 1.02
UROBILINOGEN, POC UA: 0.2

## 2022-02-21 PROCEDURE — 3008F BODY MASS INDEX DOCD: CPT | Mod: CPTII,S$GLB,, | Performed by: UROLOGY

## 2022-02-21 PROCEDURE — 51798 US URINE CAPACITY MEASURE: CPT | Mod: S$GLB,,, | Performed by: UROLOGY

## 2022-02-21 PROCEDURE — 3044F PR MOST RECENT HEMOGLOBIN A1C LEVEL <7.0%: ICD-10-PCS | Mod: CPTII,S$GLB,, | Performed by: UROLOGY

## 2022-02-21 PROCEDURE — 81002 POCT URINE DIPSTICK WITHOUT MICROSCOPE: ICD-10-PCS | Mod: S$GLB,,, | Performed by: UROLOGY

## 2022-02-21 PROCEDURE — 1159F MED LIST DOCD IN RCRD: CPT | Mod: CPTII,S$GLB,, | Performed by: UROLOGY

## 2022-02-21 PROCEDURE — 3075F PR MOST RECENT SYSTOLIC BLOOD PRESS GE 130-139MM HG: ICD-10-PCS | Mod: CPTII,S$GLB,, | Performed by: UROLOGY

## 2022-02-21 PROCEDURE — 4010F ACE/ARB THERAPY RXD/TAKEN: CPT | Mod: CPTII,S$GLB,, | Performed by: UROLOGY

## 2022-02-21 PROCEDURE — 3079F DIAST BP 80-89 MM HG: CPT | Mod: CPTII,S$GLB,, | Performed by: UROLOGY

## 2022-02-21 PROCEDURE — 1160F RVW MEDS BY RX/DR IN RCRD: CPT | Mod: CPTII,S$GLB,, | Performed by: UROLOGY

## 2022-02-21 PROCEDURE — 51798 POCT BLADDER SCAN: ICD-10-PCS | Mod: S$GLB,,, | Performed by: UROLOGY

## 2022-02-21 PROCEDURE — 1159F PR MEDICATION LIST DOCUMENTED IN MEDICAL RECORD: ICD-10-PCS | Mod: CPTII,S$GLB,, | Performed by: UROLOGY

## 2022-02-21 PROCEDURE — 99999 PR PBB SHADOW E&M-EST. PATIENT-LVL V: CPT | Mod: PBBFAC,,, | Performed by: UROLOGY

## 2022-02-21 PROCEDURE — 3079F PR MOST RECENT DIASTOLIC BLOOD PRESSURE 80-89 MM HG: ICD-10-PCS | Mod: CPTII,S$GLB,, | Performed by: UROLOGY

## 2022-02-21 PROCEDURE — 3044F HG A1C LEVEL LT 7.0%: CPT | Mod: CPTII,S$GLB,, | Performed by: UROLOGY

## 2022-02-21 PROCEDURE — 3066F NEPHROPATHY DOC TX: CPT | Mod: CPTII,S$GLB,, | Performed by: UROLOGY

## 2022-02-21 PROCEDURE — 1160F PR REVIEW ALL MEDS BY PRESCRIBER/CLIN PHARMACIST DOCUMENTED: ICD-10-PCS | Mod: CPTII,S$GLB,, | Performed by: UROLOGY

## 2022-02-21 PROCEDURE — 3060F PR POS MICROALBUMINURIA RESULT DOCUMENTED/REVIEW: ICD-10-PCS | Mod: CPTII,S$GLB,, | Performed by: UROLOGY

## 2022-02-21 PROCEDURE — 99999 PR PBB SHADOW E&M-EST. PATIENT-LVL V: ICD-10-PCS | Mod: PBBFAC,,, | Performed by: UROLOGY

## 2022-02-21 PROCEDURE — 99215 PR OFFICE/OUTPT VISIT, EST, LEVL V, 40-54 MIN: ICD-10-PCS | Mod: S$GLB,,, | Performed by: UROLOGY

## 2022-02-21 PROCEDURE — 3066F PR DOCUMENTATION OF TREATMENT FOR NEPHROPATHY: ICD-10-PCS | Mod: CPTII,S$GLB,, | Performed by: UROLOGY

## 2022-02-21 PROCEDURE — 4010F PR ACE/ARB THEARPY RXD/TAKEN: ICD-10-PCS | Mod: CPTII,S$GLB,, | Performed by: UROLOGY

## 2022-02-21 PROCEDURE — 3075F SYST BP GE 130 - 139MM HG: CPT | Mod: CPTII,S$GLB,, | Performed by: UROLOGY

## 2022-02-21 PROCEDURE — 99215 OFFICE O/P EST HI 40 MIN: CPT | Mod: S$GLB,,, | Performed by: UROLOGY

## 2022-02-21 PROCEDURE — 3008F PR BODY MASS INDEX (BMI) DOCUMENTED: ICD-10-PCS | Mod: CPTII,S$GLB,, | Performed by: UROLOGY

## 2022-02-21 PROCEDURE — 3060F POS MICROALBUMINURIA REV: CPT | Mod: CPTII,S$GLB,, | Performed by: UROLOGY

## 2022-02-21 PROCEDURE — 81002 URINALYSIS NONAUTO W/O SCOPE: CPT | Mod: S$GLB,,, | Performed by: UROLOGY

## 2022-02-21 RX ORDER — CIPROFLOXACIN 500 MG/1
500 TABLET ORAL 2 TIMES DAILY
Qty: 6 TABLET | Refills: 0 | Status: SHIPPED | OUTPATIENT
Start: 2022-02-21 | End: 2022-04-05

## 2022-02-21 RX ORDER — TAMSULOSIN HYDROCHLORIDE 0.4 MG/1
0.4 CAPSULE ORAL NIGHTLY
Qty: 30 CAPSULE | Refills: 11 | Status: SHIPPED | OUTPATIENT
Start: 2022-02-21 | End: 2023-03-15 | Stop reason: SDUPTHER

## 2022-02-21 RX ORDER — GENTAMICIN SULFATE 40 MG/ML
80 INJECTION, SOLUTION INTRAMUSCULAR; INTRAVENOUS ONCE
Status: CANCELLED | OUTPATIENT
Start: 2022-03-22

## 2022-02-21 NOTE — PATIENT INSTRUCTIONS
Discussed conservative measures to control urgency and frequency including   1. Avoiding/minimizing bladder irritants (see below), especially around peak urgency times    Discussed bladder irritants include coffe (even decaf), tea, alcohol, soda, spicy foods, acidic juices (orange, tomato), vinegar, and artificial sweeteners/sugary beverages.    2. timed voiding - empty on a schedule (approx ~2-3 hours) in spite of need to urinate, to get ahead of urge    3. dont postponing voiding - dont hold it on purpose     4. bowel regimen as distended bowel has extrinsic compressive effect on bladder.   - any or all of the following in any combination, titrate to soft daily bowel movement without pushing or straining  - colace/stool softener capsule - once to twice daily  - miralax - 1 capful daily to start, can increase to 2x daily (or decrease to 1/2 cap daily)  - increase dietary fibery  - fiber supplements, such as metamucil  - prunes, prune juice    5. INCREASE water intake    6. Stop fluids 2 hours before bed, and urinate just before bed    7. Tamsulosin 0.4mg nightly    8. Cystoscopy and prostate biopsy at [facility].

## 2022-02-21 NOTE — H&P (VIEW-ONLY)
[facility] Urology Progress Note    Parth Juarez is a 61 y.o. male who presents for follow up of BPH and elevated psa, transitioning care from Dr Gibbs    He is followed for his primary care by Dr Styles whom he last saw 1/6/22 for fu HTN, HLD, NICCI and noted  Dr. Gibbs monitoring for mild LUTS due to BPH .  Has occ urgency and mild urge incontinence. Feels he empties well. Urinates once at night. Nl stream. Nl hesitancy  Screening psa ordered and noted to be 6.3 on 2/4/22, up from 3.5 in 2021 and 3.7 in 2020  On review, has psa elevation up to 4.4 in 2018 and 4.3 in 2016 with interim 2.7 to 3.4 from 2018 to above, and baseline 3 range from 3.1 to 3.79 from 2008 to 2016  Velocity increased 1.5 to 2.6 to 3.7 from 2006 to 2007 to 2008 (age 47)    On chart review with Dr Gibbs, in 2009 he had TRUS-bx with 43g prostate and benign sextant biopsy  No famHx CaP but father had bladder ca  When psa crispin on 3/20/16 to 4.3 he was placed on finasteride 5mg daily and repeat psa 10/11/16 was 2.8 (=5.6)  When he was seen again in 2018 by Dr Gibbs it was noted : Dr Styles put the patient on finasteride 5 mg daily with some improvement in LUTS and last PSA 3/21/2018 and was 4.4 and this was before he started taking finasteride. Repeat psa 9/19/18 was 3.1 (=6.2)  Last saw Dr Gibbs 2/2020 noting progressive LUTS with urgency, frequency, PV dribble though also on diuretic and NTF x0    He returns today noting  After diuretic more frequency/urgency. Better somewhat after decreasing dose from 25 to 12.5  Felt like wasn't emptying as well when starting wellbutrin  Has been on finasteride monotherapy only in past, but stopped it completely given contraindication to blood donation for hormonal effect  PSA in 2009 at biopsy was 3.7, age adjusted elevation  Early in AM does have some UUI. Even tried shield pad.   Occ AMs if cant make it on time   Heavy coffee in AM, not much during day, no soft drink.   Metamucil daily to prevent  "constipation bc was a problem    Focused Physical Exam:    Vitals:    02/21/22 1601   BP: 134/84   Pulse: (!) 57   Resp: 18     Body mass index is 28.31 kg/m². Weight: 82 kg (180 lb 12.4 oz) Height: 5' 7" (170.2 cm)     Abdomen: Soft, non-tender, nondistended, no CVA tenderness  : normal phallus without plaques/lesions, orthotopic urethral meatus, bilaterally desc testes in normal scrotum without mass or lesion  SANDY: normal sphincter tone, no masses, no hemmorrhoids   PROSTATE: 35-40g, no nodules, non-tender, symmetrical.       Recent Results (from the past 336 hour(s))   POCT URINE DIPSTICK WITHOUT MICROSCOPE    Collection Time: 02/21/22  4:04 PM   Result Value Ref Range    Color, UA Yellow     pH, UA 6.0     WBC, UA neg     Nitrite, UA neg     Protein, POC neg     Glucose, UA neg     Ketones, UA neg     Urobilinogen, UA 0.2     Bilirubin, POC neg     Blood, UA neg     Clarity, UA Clear     Spec Grav UA 1.020    POCT Bladder Scan    Collection Time: 02/21/22  4:05 PM   Result Value Ref Range    POC Residual Urine Volume 87 0 - 100 mL       ASSESSMENT   1. Elevated PSA  POCT Bladder Scan    Case Request Operating Room: BIOPSY, PROSTATE, RECTAL APPROACH, WITH US GUIDANCE, CYSTOSCOPY   2. BPH with obstruction/lower urinary tract symptoms  POCT URINE DIPSTICK WITHOUT MICROSCOPE    Case Request Operating Room: BIOPSY, PROSTATE, RECTAL APPROACH, WITH US GUIDANCE, CYSTOSCOPY       Plan     Extensively reviewed prior urologic history including all records available from Dr. Gibbs, as well as recent PCP evaluation, and all PSA lab values including does which needed interpretation in the context of his finasteride use as summarized above.    I had a long discussion with the patient regarding the natural history of cancer in men as well as when diagnostics are indicated. We also discussed differential for elevated psa which also includes benign enlargement and prostatitis.  We did discuss that an elevated PSA is considered " a PSA greater than 4 because statistically 20% of people in this value range are found to have prostate cancer, however we also discussed a bit about PSA velocity and trends and age specific psa elevations. His prostate biopsy was in 2009 at time of an age adjusted elevated PSA, and at that time his biopsy was benign and his PSA was a normal PSA density for his 43 g prostate at that time.  He has had intermittent PSA elevations over the years and intermittent finasteride use, of which recheck of PSA once taking finasteride actually increased to elevations higher than those when he started the medication when adjusting for medication effect.  He has been off of finasteride for a few years and his PSA has been rising and is again a true elevation and 1 of the highest it has been over the years even when adjusting for previous finasteride effect, and I therefore recommended prostate biopsy to evaluate for underlying malignancy.  We discussed biopsy and in detail, including 1% risk of infectious complications including sepsis but that it is an otherwise safe diagnostic procedure with expected hematuria hematospermia after.      Of note we did discuss the utility of free and total PSA as well as MRI and the prostate both at this time, and following in the future should biopsy be negative, and given his PSA trends he is agreeable to proceed with biopsy and deferred further evaluations prior which is quite reasonable.    I went over the details of a transrectal ultrasound-guided biopsy of the prostate, and described the technique in detail.   The patient will be given local injection anesthetic to block the prostate so as to minimize any pain. 12-14 biopsy specimens will be taken. These will be sent for histopathology analysis.   Complications include bleeding, fever and chills. He was also instructed to watch for any signs of fever. If he does have any fever or chills after, he was advised to come to the emergency room  right away for intravenous antibiotics and possible admission to the hospital. He is to refrain from any strenuous activity including sexual activity for the next 72 hours after biopsy. He was also advised that he may have blood while urinating, during bowel movements as well as during ejaculations. He was given a prebiopsy/postbiopsy instruction sheet was reminding him to avoid aspirin and blood thinners for 7 days prior, take the Rxed antibiotics the day before, day of, day after biopsy, and perform a fleet enema at home morning of biopsy. All questions he had were answered in detail.      As well, has had some longstanding lower urinary tract symptoms.  His irritative symptoms are predominant over his obstructive symptoms but has had long history of BPH with enlarged prostate from a young age and we did discuss the natural history of BPH with obstruction leading to irritative symptoms such as urgency and frequency.  No significant benefit from his previous finasteride monotherapy which he discontinued, and still has irritative predominant symptoms and only moderate obstructive LUTS.  We did have a risk benefit discussion about cystoscopy at time of prostate biopsy, as if he again has BPH only findings, cystoscopic evaluation for level of obstruction and changes at the bladder and anatomic relationship of prostate to bladder would help guide further recommendations for BPH/LUTS management.  He is agreeable to proceed in this procedure was reviewed with the use of local anesthesia.  In the interim, also had risk benefit about starting alpha-blocker to see if relief of obstruction will facilitate resolution of some of his irritative symptoms, in combination with conservative recommendations for urgency and frequency which were reviewed and provided in writing on his AVS.    Cystoscopy and TRUS/bx scheduled at [facility] on 3/22/22    Total time spent in/on encounter today, including face to face time with patient,  counseling, medical record review, interpretation of tests/results, , and treatment plan coordination: 45 minutes

## 2022-02-21 NOTE — PROGRESS NOTES
[facility] Urology Progress Note    Parth Juarez is a 61 y.o. male who presents for follow up of BPH and elevated psa, transitioning care from Dr Gibbs    He is followed for his primary care by Dr Styles whom he last saw 1/6/22 for fu HTN, HLD, NICCI and noted  Dr. Gibbs monitoring for mild LUTS due to BPH .  Has occ urgency and mild urge incontinence. Feels he empties well. Urinates once at night. Nl stream. Nl hesitancy  Screening psa ordered and noted to be 6.3 on 2/4/22, up from 3.5 in 2021 and 3.7 in 2020  On review, has psa elevation up to 4.4 in 2018 and 4.3 in 2016 with interim 2.7 to 3.4 from 2018 to above, and baseline 3 range from 3.1 to 3.79 from 2008 to 2016  Velocity increased 1.5 to 2.6 to 3.7 from 2006 to 2007 to 2008 (age 47)    On chart review with Dr Gibbs, in 2009 he had TRUS-bx with 43g prostate and benign sextant biopsy  No famHx CaP but father had bladder ca  When psa crispin on 3/20/16 to 4.3 he was placed on finasteride 5mg daily and repeat psa 10/11/16 was 2.8 (=5.6)  When he was seen again in 2018 by Dr Gibbs it was noted : Dr Styles put the patient on finasteride 5 mg daily with some improvement in LUTS and last PSA 3/21/2018 and was 4.4 and this was before he started taking finasteride. Repeat psa 9/19/18 was 3.1 (=6.2)  Last saw Dr Gibbs 2/2020 noting progressive LUTS with urgency, frequency, PV dribble though also on diuretic and NTF x0    He returns today noting  After diuretic more frequency/urgency. Better somewhat after decreasing dose from 25 to 12.5  Felt like wasn't emptying as well when starting wellbutrin  Has been on finasteride monotherapy only in past, but stopped it completely given contraindication to blood donation for hormonal effect  PSA in 2009 at biopsy was 3.7, age adjusted elevation  Early in AM does have some UUI. Even tried shield pad.   Occ AMs if cant make it on time   Heavy coffee in AM, not much during day, no soft drink.   Metamucil daily to prevent  "constipation bc was a problem    Focused Physical Exam:    Vitals:    02/21/22 1601   BP: 134/84   Pulse: (!) 57   Resp: 18     Body mass index is 28.31 kg/m². Weight: 82 kg (180 lb 12.4 oz) Height: 5' 7" (170.2 cm)     Abdomen: Soft, non-tender, nondistended, no CVA tenderness  : normal phallus without plaques/lesions, orthotopic urethral meatus, bilaterally desc testes in normal scrotum without mass or lesion  SANDY: normal sphincter tone, no masses, no hemmorrhoids   PROSTATE: 35-40g, no nodules, non-tender, symmetrical.       Recent Results (from the past 336 hour(s))   POCT URINE DIPSTICK WITHOUT MICROSCOPE    Collection Time: 02/21/22  4:04 PM   Result Value Ref Range    Color, UA Yellow     pH, UA 6.0     WBC, UA neg     Nitrite, UA neg     Protein, POC neg     Glucose, UA neg     Ketones, UA neg     Urobilinogen, UA 0.2     Bilirubin, POC neg     Blood, UA neg     Clarity, UA Clear     Spec Grav UA 1.020    POCT Bladder Scan    Collection Time: 02/21/22  4:05 PM   Result Value Ref Range    POC Residual Urine Volume 87 0 - 100 mL       ASSESSMENT   1. Elevated PSA  POCT Bladder Scan    Case Request Operating Room: BIOPSY, PROSTATE, RECTAL APPROACH, WITH US GUIDANCE, CYSTOSCOPY   2. BPH with obstruction/lower urinary tract symptoms  POCT URINE DIPSTICK WITHOUT MICROSCOPE    Case Request Operating Room: BIOPSY, PROSTATE, RECTAL APPROACH, WITH US GUIDANCE, CYSTOSCOPY       Plan     Extensively reviewed prior urologic history including all records available from Dr. Gibbs, as well as recent PCP evaluation, and all PSA lab values including does which needed interpretation in the context of his finasteride use as summarized above.    I had a long discussion with the patient regarding the natural history of cancer in men as well as when diagnostics are indicated. We also discussed differential for elevated psa which also includes benign enlargement and prostatitis.  We did discuss that an elevated PSA is considered " a PSA greater than 4 because statistically 20% of people in this value range are found to have prostate cancer, however we also discussed a bit about PSA velocity and trends and age specific psa elevations. His prostate biopsy was in 2009 at time of an age adjusted elevated PSA, and at that time his biopsy was benign and his PSA was a normal PSA density for his 43 g prostate at that time.  He has had intermittent PSA elevations over the years and intermittent finasteride use, of which recheck of PSA once taking finasteride actually increased to elevations higher than those when he started the medication when adjusting for medication effect.  He has been off of finasteride for a few years and his PSA has been rising and is again a true elevation and 1 of the highest it has been over the years even when adjusting for previous finasteride effect, and I therefore recommended prostate biopsy to evaluate for underlying malignancy.  We discussed biopsy and in detail, including 1% risk of infectious complications including sepsis but that it is an otherwise safe diagnostic procedure with expected hematuria hematospermia after.      Of note we did discuss the utility of free and total PSA as well as MRI and the prostate both at this time, and following in the future should biopsy be negative, and given his PSA trends he is agreeable to proceed with biopsy and deferred further evaluations prior which is quite reasonable.    I went over the details of a transrectal ultrasound-guided biopsy of the prostate, and described the technique in detail.   The patient will be given local injection anesthetic to block the prostate so as to minimize any pain. 12-14 biopsy specimens will be taken. These will be sent for histopathology analysis.   Complications include bleeding, fever and chills. He was also instructed to watch for any signs of fever. If he does have any fever or chills after, he was advised to come to the emergency room  right away for intravenous antibiotics and possible admission to the hospital. He is to refrain from any strenuous activity including sexual activity for the next 72 hours after biopsy. He was also advised that he may have blood while urinating, during bowel movements as well as during ejaculations. He was given a prebiopsy/postbiopsy instruction sheet was reminding him to avoid aspirin and blood thinners for 7 days prior, take the Rxed antibiotics the day before, day of, day after biopsy, and perform a fleet enema at home morning of biopsy. All questions he had were answered in detail.      As well, has had some longstanding lower urinary tract symptoms.  His irritative symptoms are predominant over his obstructive symptoms but has had long history of BPH with enlarged prostate from a young age and we did discuss the natural history of BPH with obstruction leading to irritative symptoms such as urgency and frequency.  No significant benefit from his previous finasteride monotherapy which he discontinued, and still has irritative predominant symptoms and only moderate obstructive LUTS.  We did have a risk benefit discussion about cystoscopy at time of prostate biopsy, as if he again has BPH only findings, cystoscopic evaluation for level of obstruction and changes at the bladder and anatomic relationship of prostate to bladder would help guide further recommendations for BPH/LUTS management.  He is agreeable to proceed in this procedure was reviewed with the use of local anesthesia.  In the interim, also had risk benefit about starting alpha-blocker to see if relief of obstruction will facilitate resolution of some of his irritative symptoms, in combination with conservative recommendations for urgency and frequency which were reviewed and provided in writing on his AVS.    Cystoscopy and TRUS/bx scheduled at [facility] on 3/22/22    Total time spent in/on encounter today, including face to face time with patient,  counseling, medical record review, interpretation of tests/results, , and treatment plan coordination: 45 minutes

## 2022-03-18 NOTE — DISCHARGE INSTRUCTIONS
Before leaving, please make sure you have all your personal belongings such as glasses, purses, wallets, keys, cell phones, jewelry, jackets etc           After the procedure    Drink plenty of fluids.  You may have burning or light bleeding when you urinate--this is normal.  Medications may be prescribed to ease any discomfort or prevent infection. Take these as directed.  Call your doctor if you have heavy bleeding or blood clots, burning that lasts more than a day, a fever over 100°F  (38° C), or trouble urinating.    After Surgery:  Always be aware that any surgery can cause these symptoms:    Pain- Medication can be prescribed for pain to decrease your pain but may not completely take your pain away.  Over the Counter pain medicine my be enough and you can always use Ice and rest to help ease pain.    Bleeding- a little bleeding after a surgery is usually within normal.  If there is a lot of blood you need to notify your MD.  Emergency treatments of bleeding are cold application, elevation of the bleeding site and compression.    Infection- Infection after surgery is NOT a normal occurrence.  Signs of infection are fever, swelling, hot to touch the incision.  If this occurs notify your MD immediately.    Nausea- this can be common after a surgery especially if you have had anesthesia medicine or are taking pain medicine.  Staying on clear liquids, bland foods, gingerale, or over the counter anti nausea medicines can help.  If you vomit more than once, notify your MD.  Anti Nausea medicines can be prescribed.       After your prostate biopsy    Avoid sexual activity,lifting, strenuous physical activity or exertion for 3 days     No riding mowers, tractors, bicycles, motorcycles for 2-3 weeks    You may experience blood in your urine or stool for up to 2 weeks and in your semen for up to 6 weeks.  This is a normal side effect of the procedure and will resolve.    Drink plenty of water    Take antibiotics as  prescribed    If you experience any of the following conditions, please return immediately to the clinic (during office hrs) or the Emergency Room if after hours:       Fever       Inabiltiy to urinate       Severe bleeding    You may resume aspirin, anti inflammatory and blood thinners in 3 days    Results take at minimum 10 business days.  A 2 week follow up will be scheduled to review pathology reports in the clinic    During office hours, please call  and ask to speak with the nurse if you have any questions.  If after hours, call the [facility] On Call # to be connectied to the doctor on call    After Surgery:  Always be aware that any surgery can cause these symptoms:    Pain- Medication can be prescribed for pain to decrease your pain but may not completely take your pain away.  Over the Counter pain medicine my be enough and you can always use Ice and rest to help ease pain.    Bleeding- a little bleeding after a surgery is usually within normal.  If there is a lot of blood you need to notify your MD.  Emergency treatments of bleeding are cold application, elevation of the bleeding site and compression.    Infection- Infection after surgery is NOT a normal occurrence.  Signs of infection are fever, swelling, hot to touch the incision.  If this occurs notify your MD immediately.    Nausea- this can be common after a surgery especially if you have had anesthesia medicine or are taking pain medicine.  Staying on clear liquids, bland foods, gingerale, or over the counter anti nausea medicines can help.  If you vomit more than once, notify your MD.  Anti Nausea medicines can be prescribed.

## 2022-03-19 ENCOUNTER — LAB VISIT (OUTPATIENT)
Dept: PRIMARY CARE CLINIC | Facility: CLINIC | Age: 62
End: 2022-03-19
Payer: COMMERCIAL

## 2022-03-19 DIAGNOSIS — Z01.818 PRE-OP TESTING: ICD-10-CM

## 2022-03-19 PROCEDURE — U0005 INFEC AGEN DETEC AMPLI PROBE: HCPCS | Performed by: UROLOGY

## 2022-03-19 PROCEDURE — U0003 INFECTIOUS AGENT DETECTION BY NUCLEIC ACID (DNA OR RNA); SEVERE ACUTE RESPIRATORY SYNDROME CORONAVIRUS 2 (SARS-COV-2) (CORONAVIRUS DISEASE [COVID-19]), AMPLIFIED PROBE TECHNIQUE, MAKING USE OF HIGH THROUGHPUT TECHNOLOGIES AS DESCRIBED BY CMS-2020-01-R: HCPCS | Performed by: UROLOGY

## 2022-03-20 LAB
SARS-COV-2 RNA RESP QL NAA+PROBE: NOT DETECTED
SARS-COV-2- CYCLE NUMBER: NORMAL

## 2022-03-22 ENCOUNTER — HOSPITAL ENCOUNTER (OUTPATIENT)
Facility: AMBULARY SURGERY CENTER | Age: 62
Discharge: HOME | End: 2022-03-22
Attending: UROLOGY | Admitting: UROLOGY
Payer: COMMERCIAL

## 2022-03-22 VITALS
SYSTOLIC BLOOD PRESSURE: 122 MMHG | RESPIRATION RATE: 17 BRPM | DIASTOLIC BLOOD PRESSURE: 72 MMHG | HEART RATE: 47 BPM | TEMPERATURE: 98 F | HEIGHT: 67 IN | BODY MASS INDEX: 28.37 KG/M2 | OXYGEN SATURATION: 100 % | WEIGHT: 180.75 LBS

## 2022-03-22 DIAGNOSIS — R97.20 ELEVATED PSA: ICD-10-CM

## 2022-03-22 DIAGNOSIS — N40.1 BPH WITH OBSTRUCTION/LOWER URINARY TRACT SYMPTOMS: ICD-10-CM

## 2022-03-22 DIAGNOSIS — N13.8 BPH WITH OBSTRUCTION/LOWER URINARY TRACT SYMPTOMS: ICD-10-CM

## 2022-03-22 PROCEDURE — 55700 PR BIOPSY OF PROSTATE,NEEDLE/PUNCH: CPT | Mod: ,,, | Performed by: UROLOGY

## 2022-03-22 PROCEDURE — 76872 PR US TRANSRECTAL: ICD-10-PCS | Mod: 26,,, | Performed by: UROLOGY

## 2022-03-22 PROCEDURE — 76872 US TRANSRECTAL: CPT | Performed by: UROLOGY

## 2022-03-22 PROCEDURE — 88305 TISSUE EXAM BY PATHOLOGIST: CPT | Performed by: STUDENT IN AN ORGANIZED HEALTH CARE EDUCATION/TRAINING PROGRAM

## 2022-03-22 PROCEDURE — 55700 HC PROSTATE NEEDLE BIOPSY: CPT | Performed by: UROLOGY

## 2022-03-22 PROCEDURE — 52000 PR CYSTOURETHROSCOPY: ICD-10-PCS | Mod: 59,,, | Performed by: UROLOGY

## 2022-03-22 PROCEDURE — 76872 US TRANSRECTAL: CPT | Mod: 26,,, | Performed by: UROLOGY

## 2022-03-22 PROCEDURE — 55700 PR BIOPSY OF PROSTATE,NEEDLE/PUNCH: ICD-10-PCS | Mod: ,,, | Performed by: UROLOGY

## 2022-03-22 PROCEDURE — 52000 CYSTOURETHROSCOPY: CPT | Mod: 59,,, | Performed by: UROLOGY

## 2022-03-22 PROCEDURE — 52000 CYSTOURETHROSCOPY: CPT | Performed by: UROLOGY

## 2022-03-22 PROCEDURE — 88305 TISSUE EXAM BY PATHOLOGIST: CPT | Mod: 26,,, | Performed by: STUDENT IN AN ORGANIZED HEALTH CARE EDUCATION/TRAINING PROGRAM

## 2022-03-22 PROCEDURE — 88305 TISSUE EXAM BY PATHOLOGIST: ICD-10-PCS | Mod: 26,,, | Performed by: STUDENT IN AN ORGANIZED HEALTH CARE EDUCATION/TRAINING PROGRAM

## 2022-03-22 RX ORDER — LIDOCAINE HYDROCHLORIDE 20 MG/ML
JELLY TOPICAL
Status: DISCONTINUED
Start: 2022-03-22 | End: 2022-03-22 | Stop reason: HOSPADM

## 2022-03-22 RX ORDER — GENTAMICIN SULFATE 40 MG/ML
80 INJECTION, SOLUTION INTRAMUSCULAR; INTRAVENOUS ONCE
Status: COMPLETED | OUTPATIENT
Start: 2022-03-22 | End: 2022-03-22

## 2022-03-22 RX ORDER — LIDOCAINE HYDROCHLORIDE 20 MG/ML
JELLY TOPICAL
Status: DISCONTINUED | OUTPATIENT
Start: 2022-03-22 | End: 2022-03-22 | Stop reason: HOSPADM

## 2022-03-22 RX ORDER — LIDOCAINE HYDROCHLORIDE 10 MG/ML
INJECTION INFILTRATION; PERINEURAL
Status: DISCONTINUED | OUTPATIENT
Start: 2022-03-22 | End: 2022-03-22 | Stop reason: HOSPADM

## 2022-03-22 RX ORDER — WATER 1 ML/ML
IRRIGANT IRRIGATION
Status: DISCONTINUED | OUTPATIENT
Start: 2022-03-22 | End: 2022-03-22 | Stop reason: HOSPADM

## 2022-03-22 RX ADMIN — GENTAMICIN SULFATE 80 MG: 40 INJECTION, SOLUTION INTRAMUSCULAR; INTRAVENOUS at 01:03

## 2022-03-22 NOTE — PLAN OF CARE
Discharge instructions given to pt, verbalized understanding.  Able to tolerate PO fluids.  C/O slight burning with urination x1.  Ambulating out to vehicle in no distress.

## 2022-03-23 NOTE — OP NOTE
[facility] Urology Op/Brief DC Note     Date: 3/22/22     Staff Surgeon: Corey Stoner MD     Pre-Op Diagnosis:   Elevated PSA  BPH/LUTS     Post-Op Diagnosis: same     Procedure(s) Performed:   1. Transrectal ultrasound guided prostate needle biopsy  2. Cystoscopy (flexible)     INDICATION FOR PROCEDURE:   Hx elevated psa, prev on finasteride monotherapy, with negative biopsy in 2009 with 43g prostate at that time. Progressive urgency with rare UUI. Started flomax. Of note took at 4am and preprocedure 2pm BP 90s systolic. Will follow.      ANESTHESIA: Local periprostatic block; 10 cc 1% lidocaine      PSA: 6.3      TRUS VOLUME: 83.9cc (W 57mm; H 43.2mm; L 65.1mm)     EBL: Minimal     SPECIMEN:   14 core prostate biopsy - right and left base, middle, apex - medial and lateral of each, and bilateral transition zones     ULTRASOUND FINDINGS:  scattered punctate hypoechoities, no median lobe, normal SVs     CYSTO FINDINGS:    significant kissing Lateral lobe obstruction centrally and distally, more anterolateral proximally though asymm R > L,  with minimal intravesical extension without median lobe. Mild scattered trabeculations      CONFIRMED PATIENT TOOK ANTIBIOTICS: Yes     CONFIRMED PATIENT NOT TAKING ASPIRIN OR ANTICOAGULANTS: Yes     CONFIRMED PATIENT USED ENEMA: Yes     PROCEDURE IN DETAIL:  After informed consent, the patient was prepped and drapped in standard  cystoscopic fashion and 2% xylocaine jelly was instilled into the urethra. 80mg gentamicin injected IM      Then a a flexible cystoscope was passed into the bladder via the urethra.   Anterior urethra normal without lesions or narrowings. The prostatic urethra demonstrated obstructive findings as above from  Lateral lobe obstruction without median lobe as described.     Bladder otherwise systematically inspected and no mucosal lesions or tumors. Assess for signs of obstruction such as trabeculations, cellules, diverticulum as noted above     Patient  voided into urinal, and was then turned to the left lateral position and TRUS probe inserted into rectum. Ultrasound measurements taken as above and ultrasound of prostate performed with findings as above. Noted to empty bladder well based on US. Approximately 10cc of 1% lidocaine injected bilaterally in tavo-prostatic block fashion, as well as at the apex.   14 total core biopies taken were taken in a sextant fashion with inclusion of transition zones, with additional cores as above, targeting extra cores in transition zone given multiple past negative standard biopsies     DISHARGE:  Status post uncomplicated outpatient procedure as above.    Disposition: Home.  He will follow up in 2 weeks for biopsy results.   Resume regular diet  FU 2 weeks for biopsy results and further management discussion  Return to ER if temp >101, uncontrollable urethral or rectal bleeding, or inability to urinate/urinary retention  No sex/ejaculation x3 days, no riding mowers/tractors/bikes x2-4 weeks  Drink plenty of water may see blood  Hold asa/fishoil/bloodthinners 3-5d

## 2022-03-30 ENCOUNTER — PATIENT MESSAGE (OUTPATIENT)
Dept: FAMILY MEDICINE | Facility: CLINIC | Age: 62
End: 2022-03-30
Payer: COMMERCIAL

## 2022-03-30 LAB
FINAL PATHOLOGIC DIAGNOSIS: NORMAL
GROSS: NORMAL
Lab: NORMAL

## 2022-04-04 ENCOUNTER — OFFICE VISIT (OUTPATIENT)
Dept: UROLOGY | Facility: CLINIC | Age: 62
End: 2022-04-04
Payer: COMMERCIAL

## 2022-04-04 VITALS
BODY MASS INDEX: 29.07 KG/M2 | HEART RATE: 57 BPM | DIASTOLIC BLOOD PRESSURE: 73 MMHG | RESPIRATION RATE: 18 BRPM | SYSTOLIC BLOOD PRESSURE: 115 MMHG | WEIGHT: 185.19 LBS | HEIGHT: 67 IN

## 2022-04-04 DIAGNOSIS — R97.20 BPH WITH ELEVATED PSA: ICD-10-CM

## 2022-04-04 DIAGNOSIS — N13.8 BPH WITH OBSTRUCTION/LOWER URINARY TRACT SYMPTOMS: Primary | ICD-10-CM

## 2022-04-04 DIAGNOSIS — N40.0 BPH WITH ELEVATED PSA: ICD-10-CM

## 2022-04-04 DIAGNOSIS — N40.1 BPH WITH OBSTRUCTION/LOWER URINARY TRACT SYMPTOMS: Primary | ICD-10-CM

## 2022-04-04 PROCEDURE — 3060F PR POS MICROALBUMINURIA RESULT DOCUMENTED/REVIEW: ICD-10-PCS | Mod: CPTII,S$GLB,, | Performed by: UROLOGY

## 2022-04-04 PROCEDURE — 4010F ACE/ARB THERAPY RXD/TAKEN: CPT | Mod: CPTII,S$GLB,, | Performed by: UROLOGY

## 2022-04-04 PROCEDURE — 3060F POS MICROALBUMINURIA REV: CPT | Mod: CPTII,S$GLB,, | Performed by: UROLOGY

## 2022-04-04 PROCEDURE — 4010F PR ACE/ARB THEARPY RXD/TAKEN: ICD-10-PCS | Mod: CPTII,S$GLB,, | Performed by: UROLOGY

## 2022-04-04 PROCEDURE — 3074F SYST BP LT 130 MM HG: CPT | Mod: CPTII,S$GLB,, | Performed by: UROLOGY

## 2022-04-04 PROCEDURE — 3078F PR MOST RECENT DIASTOLIC BLOOD PRESSURE < 80 MM HG: ICD-10-PCS | Mod: CPTII,S$GLB,, | Performed by: UROLOGY

## 2022-04-04 PROCEDURE — 1160F RVW MEDS BY RX/DR IN RCRD: CPT | Mod: CPTII,S$GLB,, | Performed by: UROLOGY

## 2022-04-04 PROCEDURE — 99999 PR PBB SHADOW E&M-EST. PATIENT-LVL IV: ICD-10-PCS | Mod: PBBFAC,,, | Performed by: UROLOGY

## 2022-04-04 PROCEDURE — 3074F PR MOST RECENT SYSTOLIC BLOOD PRESSURE < 130 MM HG: ICD-10-PCS | Mod: CPTII,S$GLB,, | Performed by: UROLOGY

## 2022-04-04 PROCEDURE — 3008F PR BODY MASS INDEX (BMI) DOCUMENTED: ICD-10-PCS | Mod: CPTII,S$GLB,, | Performed by: UROLOGY

## 2022-04-04 PROCEDURE — 1159F PR MEDICATION LIST DOCUMENTED IN MEDICAL RECORD: ICD-10-PCS | Mod: CPTII,S$GLB,, | Performed by: UROLOGY

## 2022-04-04 PROCEDURE — 3078F DIAST BP <80 MM HG: CPT | Mod: CPTII,S$GLB,, | Performed by: UROLOGY

## 2022-04-04 PROCEDURE — 3044F PR MOST RECENT HEMOGLOBIN A1C LEVEL <7.0%: ICD-10-PCS | Mod: CPTII,S$GLB,, | Performed by: UROLOGY

## 2022-04-04 PROCEDURE — 3066F PR DOCUMENTATION OF TREATMENT FOR NEPHROPATHY: ICD-10-PCS | Mod: CPTII,S$GLB,, | Performed by: UROLOGY

## 2022-04-04 PROCEDURE — 1159F MED LIST DOCD IN RCRD: CPT | Mod: CPTII,S$GLB,, | Performed by: UROLOGY

## 2022-04-04 PROCEDURE — 3008F BODY MASS INDEX DOCD: CPT | Mod: CPTII,S$GLB,, | Performed by: UROLOGY

## 2022-04-04 PROCEDURE — 99214 OFFICE O/P EST MOD 30 MIN: CPT | Mod: S$GLB,,, | Performed by: UROLOGY

## 2022-04-04 PROCEDURE — 99999 PR PBB SHADOW E&M-EST. PATIENT-LVL IV: CPT | Mod: PBBFAC,,, | Performed by: UROLOGY

## 2022-04-04 PROCEDURE — 1160F PR REVIEW ALL MEDS BY PRESCRIBER/CLIN PHARMACIST DOCUMENTED: ICD-10-PCS | Mod: CPTII,S$GLB,, | Performed by: UROLOGY

## 2022-04-04 PROCEDURE — 3066F NEPHROPATHY DOC TX: CPT | Mod: CPTII,S$GLB,, | Performed by: UROLOGY

## 2022-04-04 PROCEDURE — 99214 PR OFFICE/OUTPT VISIT, EST, LEVL IV, 30-39 MIN: ICD-10-PCS | Mod: S$GLB,,, | Performed by: UROLOGY

## 2022-04-04 PROCEDURE — 3044F HG A1C LEVEL LT 7.0%: CPT | Mod: CPTII,S$GLB,, | Performed by: UROLOGY

## 2022-04-05 ENCOUNTER — PATIENT MESSAGE (OUTPATIENT)
Dept: FAMILY MEDICINE | Facility: CLINIC | Age: 62
End: 2022-04-05
Payer: COMMERCIAL

## 2022-04-05 ENCOUNTER — OFFICE VISIT (OUTPATIENT)
Dept: FAMILY MEDICINE | Facility: CLINIC | Age: 62
End: 2022-04-05
Payer: COMMERCIAL

## 2022-04-05 DIAGNOSIS — R73.9 HYPERGLYCEMIA: ICD-10-CM

## 2022-04-05 DIAGNOSIS — R97.20 BPH WITH ELEVATED PSA: ICD-10-CM

## 2022-04-05 DIAGNOSIS — R80.9 POSITIVE FOR MICROALBUMINURIA: ICD-10-CM

## 2022-04-05 DIAGNOSIS — E78.5 HYPERLIPIDEMIA, UNSPECIFIED HYPERLIPIDEMIA TYPE: Primary | ICD-10-CM

## 2022-04-05 DIAGNOSIS — I10 GOOD HYPERTENSION CONTROL: ICD-10-CM

## 2022-04-05 DIAGNOSIS — N40.0 BPH WITH ELEVATED PSA: ICD-10-CM

## 2022-04-05 PROCEDURE — 1160F RVW MEDS BY RX/DR IN RCRD: CPT | Mod: CPTII,95,, | Performed by: FAMILY MEDICINE

## 2022-04-05 PROCEDURE — 99214 PR OFFICE/OUTPT VISIT, EST, LEVL IV, 30-39 MIN: ICD-10-PCS | Mod: 95,,, | Performed by: FAMILY MEDICINE

## 2022-04-05 PROCEDURE — 99214 OFFICE O/P EST MOD 30 MIN: CPT | Mod: 95,,, | Performed by: FAMILY MEDICINE

## 2022-04-05 PROCEDURE — 1159F MED LIST DOCD IN RCRD: CPT | Mod: CPTII,95,, | Performed by: FAMILY MEDICINE

## 2022-04-05 PROCEDURE — 3044F HG A1C LEVEL LT 7.0%: CPT | Mod: CPTII,95,, | Performed by: FAMILY MEDICINE

## 2022-04-05 PROCEDURE — 3060F POS MICROALBUMINURIA REV: CPT | Mod: CPTII,95,, | Performed by: FAMILY MEDICINE

## 2022-04-05 PROCEDURE — 3044F PR MOST RECENT HEMOGLOBIN A1C LEVEL <7.0%: ICD-10-PCS | Mod: CPTII,95,, | Performed by: FAMILY MEDICINE

## 2022-04-05 PROCEDURE — 1160F PR REVIEW ALL MEDS BY PRESCRIBER/CLIN PHARMACIST DOCUMENTED: ICD-10-PCS | Mod: CPTII,95,, | Performed by: FAMILY MEDICINE

## 2022-04-05 PROCEDURE — 4010F PR ACE/ARB THEARPY RXD/TAKEN: ICD-10-PCS | Mod: CPTII,95,, | Performed by: FAMILY MEDICINE

## 2022-04-05 PROCEDURE — 3066F PR DOCUMENTATION OF TREATMENT FOR NEPHROPATHY: ICD-10-PCS | Mod: CPTII,95,, | Performed by: FAMILY MEDICINE

## 2022-04-05 PROCEDURE — 3066F NEPHROPATHY DOC TX: CPT | Mod: CPTII,95,, | Performed by: FAMILY MEDICINE

## 2022-04-05 PROCEDURE — 1159F PR MEDICATION LIST DOCUMENTED IN MEDICAL RECORD: ICD-10-PCS | Mod: CPTII,95,, | Performed by: FAMILY MEDICINE

## 2022-04-05 PROCEDURE — 3060F PR POS MICROALBUMINURIA RESULT DOCUMENTED/REVIEW: ICD-10-PCS | Mod: CPTII,95,, | Performed by: FAMILY MEDICINE

## 2022-04-05 PROCEDURE — 4010F ACE/ARB THERAPY RXD/TAKEN: CPT | Mod: CPTII,95,, | Performed by: FAMILY MEDICINE

## 2022-04-05 NOTE — PROGRESS NOTES
Subjective:       Patient ID: Parth Juarez is a 62 y.o. male.    Chief Complaint: No chief complaint on file.    HPI  Review of Systems   Constitutional: Negative for fatigue and unexpected weight change.   Respiratory: Negative for chest tightness and shortness of breath.    Cardiovascular: Negative for chest pain, palpitations and leg swelling.   Gastrointestinal: Negative for abdominal pain.   Musculoskeletal: Negative for arthralgias.   Neurological: Negative for dizziness, syncope, light-headedness and headaches.       Patient Active Problem List   Diagnosis    Hyperlipidemia    Good hypertension control    Exercise-induced asthma    Positive for microalbuminuria    Hyperglycemia    Anemia    NICCI (iron deficiency anemia)    ADHD (attention deficit hyperactivity disorder), combined type    Anxiety    BPH with elevated PSA     Patient is here for a chronic conditions follow up.    Reviewed labs 2/2022  A1c 5.6  Lipids  at goal   urine ma elevated -on ARB      Urology Dr. Gibbs/now Herbie  monitoring for mild LUTS due to BPH .  Has occ urgency and mild urge incontinence. Feels he empties well. Urinates once at night. Nl stream. Nl hesitancy. PSA 2/22 6.3.  Now s/p biopsy 3/22/22- 1 specimens - all benign  With some chronic inflammation        HTN- BP has been low 100s/60s. HCTZ now stopped. BP running < 130/80 consistently on digital htn  Objective:      Physical Exam  Vitals and nursing note reviewed.   Constitutional:       Appearance: He is well-developed.   Cardiovascular:      Rate and Rhythm: Normal rate and regular rhythm.      Heart sounds: Normal heart sounds.   Pulmonary:      Effort: Pulmonary effort is normal.      Breath sounds: Normal breath sounds.   Skin:     General: Skin is warm and dry.   Neurological:      Mental Status: He is alert and oriented to person, place, and time.         Assessment:       1. Hyperlipidemia, unspecified hyperlipidemia type    2. Good hypertension control     3. Positive for microalbuminuria    4. Hyperglycemia    5. BPH with elevated PSA        Plan:         1. Hyperlipidemia, unspecified hyperlipidemia type  Controlled on current medications.  Continue current medications.    - Lipid Panel; Future    2. Good hypertension control  Controlled on current medications.  Continue current medications.      3. Positive for microalbuminuria  Cont arb and monitoring    4. Hyperglycemia  Controlled with diet in non diabetic range.  Your blood sugar is borderline high.  This means you are at risk for developing type 2 diabetes mellitus.  To lessen your risk you should exercise regularly, avoid excess carbohydrates and work toward a body mass index of less than 25.      - CBC Auto Differential; Future  - Comprehensive Metabolic Panel; Future  - Hemoglobin A1C; Future    5. BPH with elevated PSA  Cont urology mgmt and monitoring. Cont flomax        Time spent with patient: 20 minutes    Patient with be reevaluated in 6 months or sooner prn    Greater than 50% of this visit was spent counseling as described in above documentation:Yes

## 2022-04-11 PROBLEM — R97.20 BPH WITH ELEVATED PSA: Status: ACTIVE | Noted: 2022-04-11

## 2022-04-11 PROBLEM — N40.0 BPH WITH ELEVATED PSA: Status: ACTIVE | Noted: 2022-04-11

## 2022-04-26 ENCOUNTER — OFFICE VISIT (OUTPATIENT)
Dept: FAMILY MEDICINE | Facility: CLINIC | Age: 62
End: 2022-04-26
Payer: COMMERCIAL

## 2022-04-26 VITALS
WEIGHT: 186.31 LBS | HEART RATE: 83 BPM | BODY MASS INDEX: 29.18 KG/M2 | OXYGEN SATURATION: 97 % | SYSTOLIC BLOOD PRESSURE: 118 MMHG | DIASTOLIC BLOOD PRESSURE: 70 MMHG

## 2022-04-26 DIAGNOSIS — I10 GOOD HYPERTENSION CONTROL: ICD-10-CM

## 2022-04-26 DIAGNOSIS — H61.22 IMPACTED CERUMEN, LEFT EAR: Primary | ICD-10-CM

## 2022-04-26 PROCEDURE — 3078F DIAST BP <80 MM HG: CPT | Mod: CPTII,S$GLB,, | Performed by: PHYSICIAN ASSISTANT

## 2022-04-26 PROCEDURE — 1160F PR REVIEW ALL MEDS BY PRESCRIBER/CLIN PHARMACIST DOCUMENTED: ICD-10-PCS | Mod: CPTII,S$GLB,, | Performed by: PHYSICIAN ASSISTANT

## 2022-04-26 PROCEDURE — 3078F PR MOST RECENT DIASTOLIC BLOOD PRESSURE < 80 MM HG: ICD-10-PCS | Mod: CPTII,S$GLB,, | Performed by: PHYSICIAN ASSISTANT

## 2022-04-26 PROCEDURE — 99213 PR OFFICE/OUTPT VISIT, EST, LEVL III, 20-29 MIN: ICD-10-PCS | Mod: S$GLB,,, | Performed by: PHYSICIAN ASSISTANT

## 2022-04-26 PROCEDURE — 3008F BODY MASS INDEX DOCD: CPT | Mod: CPTII,S$GLB,, | Performed by: PHYSICIAN ASSISTANT

## 2022-04-26 PROCEDURE — 1159F PR MEDICATION LIST DOCUMENTED IN MEDICAL RECORD: ICD-10-PCS | Mod: CPTII,S$GLB,, | Performed by: PHYSICIAN ASSISTANT

## 2022-04-26 PROCEDURE — 3066F PR DOCUMENTATION OF TREATMENT FOR NEPHROPATHY: ICD-10-PCS | Mod: CPTII,S$GLB,, | Performed by: PHYSICIAN ASSISTANT

## 2022-04-26 PROCEDURE — 3066F NEPHROPATHY DOC TX: CPT | Mod: CPTII,S$GLB,, | Performed by: PHYSICIAN ASSISTANT

## 2022-04-26 PROCEDURE — 3008F PR BODY MASS INDEX (BMI) DOCUMENTED: ICD-10-PCS | Mod: CPTII,S$GLB,, | Performed by: PHYSICIAN ASSISTANT

## 2022-04-26 PROCEDURE — 3074F PR MOST RECENT SYSTOLIC BLOOD PRESSURE < 130 MM HG: ICD-10-PCS | Mod: CPTII,S$GLB,, | Performed by: PHYSICIAN ASSISTANT

## 2022-04-26 PROCEDURE — 4010F ACE/ARB THERAPY RXD/TAKEN: CPT | Mod: CPTII,S$GLB,, | Performed by: PHYSICIAN ASSISTANT

## 2022-04-26 PROCEDURE — 3060F PR POS MICROALBUMINURIA RESULT DOCUMENTED/REVIEW: ICD-10-PCS | Mod: CPTII,S$GLB,, | Performed by: PHYSICIAN ASSISTANT

## 2022-04-26 PROCEDURE — 1160F RVW MEDS BY RX/DR IN RCRD: CPT | Mod: CPTII,S$GLB,, | Performed by: PHYSICIAN ASSISTANT

## 2022-04-26 PROCEDURE — 99213 OFFICE O/P EST LOW 20 MIN: CPT | Mod: S$GLB,,, | Performed by: PHYSICIAN ASSISTANT

## 2022-04-26 PROCEDURE — 3074F SYST BP LT 130 MM HG: CPT | Mod: CPTII,S$GLB,, | Performed by: PHYSICIAN ASSISTANT

## 2022-04-26 PROCEDURE — 99999 PR PBB SHADOW E&M-EST. PATIENT-LVL III: CPT | Mod: PBBFAC,,, | Performed by: PHYSICIAN ASSISTANT

## 2022-04-26 PROCEDURE — 99999 PR PBB SHADOW E&M-EST. PATIENT-LVL III: ICD-10-PCS | Mod: PBBFAC,,, | Performed by: PHYSICIAN ASSISTANT

## 2022-04-26 PROCEDURE — 1159F MED LIST DOCD IN RCRD: CPT | Mod: CPTII,S$GLB,, | Performed by: PHYSICIAN ASSISTANT

## 2022-04-26 PROCEDURE — 3060F POS MICROALBUMINURIA REV: CPT | Mod: CPTII,S$GLB,, | Performed by: PHYSICIAN ASSISTANT

## 2022-04-26 PROCEDURE — 4010F PR ACE/ARB THEARPY RXD/TAKEN: ICD-10-PCS | Mod: CPTII,S$GLB,, | Performed by: PHYSICIAN ASSISTANT

## 2022-04-26 PROCEDURE — 3044F PR MOST RECENT HEMOGLOBIN A1C LEVEL <7.0%: ICD-10-PCS | Mod: CPTII,S$GLB,, | Performed by: PHYSICIAN ASSISTANT

## 2022-04-26 PROCEDURE — 3044F HG A1C LEVEL LT 7.0%: CPT | Mod: CPTII,S$GLB,, | Performed by: PHYSICIAN ASSISTANT

## 2022-04-26 NOTE — PROGRESS NOTES
Subjective:       Patient ID: Parth Juarez is a 62 y.o. male.    Chief Complaint: No chief complaint on file.    Patient presents today with complains of left ear fullness and decreased hearing in the left ear. He denies pain, fever, chills, ear drainage, sinus pain, chest pain, shortness of breath.     Review of patient's allergies indicates:   Allergen Reactions    No known drug allergies          Current Outpatient Medications:     albuterol (PROVENTIL/VENTOLIN HFA) 90 mcg/actuation inhaler, Inhale 2 puffs into the lungs every 6 (six) hours as needed for Wheezing., Disp: 18 g, Rfl: 2    ascorbic acid, vitamin C, (VITAMIN C) 500 MG tablet, Take 500 mg by mouth once daily., Disp: , Rfl:     aspirin 81 MG Chew, Take 81 mg by mouth once daily., Disp: , Rfl:     atorvastatin (LIPITOR) 40 MG tablet, Take 1 tablet (40 mg total) by mouth once daily., Disp: 90 tablet, Rfl: 3    beclomethasone (QVAR) 40 mcg/actuation Aero, Inhale 1 puff into the lungs. Controller, Disp: , Rfl:     buPROPion (WELLBUTRIN XL) 150 MG TB24 tablet, Take 1 tablet (150 mg total) by mouth every morning., Disp: 90 tablet, Rfl: 3    citalopram (CELEXA) 20 MG tablet, Take 1 tablet (20 mg total) by mouth once daily., Disp: 90 tablet, Rfl: 3    fluticasone propionate (FLONASE) 50 mcg/actuation nasal spray, 1 spray by Each Nare route daily as needed for Rhinitis., Disp: , Rfl:     FOLIC ACID/MULTIVIT-MIN/LUTEIN (CENTRUM SILVER ORAL), Take 1 tablet by mouth once daily., Disp: , Rfl:     inhalation device (AEROCHAMBER PLUS FLOW-VU), Use as directed for inhalation., Disp: 1 Device, Rfl: 0    losartan (COZAAR) 100 MG tablet, Take 1 tablet (100 mg total) by mouth once daily., Disp: 90 tablet, Rfl: 3    OMEGA-3S/DHA/EPA/FISH OIL (OMEGA 3 ORAL), Take 4 capsules by mouth once daily. , Disp: , Rfl:     psyllium 0.52 gram capsule, Take 4 capsules by mouth once daily. , Disp: , Rfl:     tamsulosin (FLOMAX) 0.4 mg Cap, Take 1 capsule (0.4 mg total)  by mouth every evening., Disp: 30 capsule, Rfl: 11    Lab Results   Component Value Date    WBC 5.05 02/04/2022    HGB 14.4 02/04/2022    HCT 46.0 02/04/2022     02/04/2022    CHOL 164 02/04/2022    TRIG 81 02/04/2022    HDL 56 02/04/2022    ALT 31 02/04/2022    AST 26 02/04/2022     (L) 02/04/2022    K 4.1 02/04/2022    CL 97 02/04/2022    CREATININE 0.9 02/04/2022    BUN 18 02/04/2022    CO2 29 02/04/2022    TSH 1.491 02/04/2022    PSA 6.3 (H) 02/04/2022    GLUF 90 08/19/2004    HGBA1C 5.6 02/04/2022       Review of Systems   Constitutional: Negative for activity change, appetite change and fever.   HENT: Positive for hearing loss. Negative for postnasal drip, rhinorrhea and sinus pressure.         Decreased hearing in left ear   Eyes: Negative for visual disturbance.   Respiratory: Negative for cough and shortness of breath.    Cardiovascular: Negative for chest pain.   Neurological: Negative for headaches.   Hematological: Negative for adenopathy.   Psychiatric/Behavioral: The patient is not nervous/anxious.        Objective:      Physical Exam  Constitutional:       General: He is not in acute distress.     Appearance: Normal appearance.   HENT:      Head: Normocephalic and atraumatic.      Right Ear: Tympanic membrane, ear canal and external ear normal.      Left Ear: Ear canal and external ear normal.      Ears:      Comments: Left TM unable to be visualized due to presence of dark cerumen.  Left ear irrigated and all cerumen removed. TM visualized- no erythema, no bulging of TM, all landmarks visualized.      Mouth/Throat:      Pharynx: No posterior oropharyngeal erythema.   Eyes:      Conjunctiva/sclera: Conjunctivae normal.   Cardiovascular:      Rate and Rhythm: Normal rate and regular rhythm.   Pulmonary:      Effort: Pulmonary effort is normal. No respiratory distress.      Breath sounds: Normal breath sounds. No wheezing.   Neurological:      Mental Status: He is alert and oriented to  person, place, and time.   Psychiatric:         Behavior: Behavior normal.         Assessment:       1. Impacted cerumen, left ear        Plan:     Diagnoses and all orders for this visit:    Impacted cerumen, left ear  Cerumen successfully removed  Good hypertension control  Controlled  Low salt diet  Continue current medication

## 2022-07-12 ENCOUNTER — DOCUMENTATION ONLY (OUTPATIENT)
Dept: FAMILY MEDICINE | Facility: CLINIC | Age: 62
End: 2022-07-12
Payer: COMMERCIAL

## 2022-07-12 LAB
CTP QC/QA: YES
SARS-COV-2 AG RESP QL IA.RAPID: NEGATIVE

## 2022-08-06 ENCOUNTER — PATIENT MESSAGE (OUTPATIENT)
Dept: ADMINISTRATIVE | Facility: OTHER | Age: 62
End: 2022-08-06
Payer: COMMERCIAL

## 2022-08-22 DIAGNOSIS — I10 HYPERTENSION, UNSPECIFIED TYPE: ICD-10-CM

## 2022-08-22 RX ORDER — BUPROPION HYDROCHLORIDE 150 MG/1
150 TABLET ORAL EVERY MORNING
Qty: 90 TABLET | Refills: 3 | Status: SHIPPED | OUTPATIENT
Start: 2022-08-22 | End: 2023-07-30

## 2022-08-29 ENCOUNTER — IMMUNIZATION (OUTPATIENT)
Dept: PRIMARY CARE CLINIC | Facility: CLINIC | Age: 62
End: 2022-08-29
Payer: COMMERCIAL

## 2022-08-29 DIAGNOSIS — Z23 NEED FOR VACCINATION: Primary | ICD-10-CM

## 2022-08-29 PROCEDURE — 0054A COVID-19, MRNA, LNP-S, PF, 30 MCG/0.3 ML DOSE VACCINE (PFIZER): ICD-10-PCS | Mod: S$GLB,,, | Performed by: FAMILY MEDICINE

## 2022-08-29 PROCEDURE — 0054A COVID-19, MRNA, LNP-S, PF, 30 MCG/0.3 ML DOSE VACCINE (PFIZER): CPT | Mod: S$GLB,,, | Performed by: FAMILY MEDICINE

## 2022-08-29 PROCEDURE — 91305 COVID-19, MRNA, LNP-S, PF, 30 MCG/0.3 ML DOSE VACCINE (PFIZER): CPT | Mod: S$GLB,,, | Performed by: FAMILY MEDICINE

## 2022-08-29 PROCEDURE — 91305 COVID-19, MRNA, LNP-S, PF, 30 MCG/0.3 ML DOSE VACCINE (PFIZER): ICD-10-PCS | Mod: S$GLB,,, | Performed by: FAMILY MEDICINE

## 2022-10-03 ENCOUNTER — PATIENT MESSAGE (OUTPATIENT)
Dept: FAMILY MEDICINE | Facility: CLINIC | Age: 62
End: 2022-10-03
Payer: COMMERCIAL

## 2022-10-04 ENCOUNTER — LAB VISIT (OUTPATIENT)
Dept: LAB | Facility: HOSPITAL | Age: 62
End: 2022-10-04
Attending: UROLOGY
Payer: COMMERCIAL

## 2022-10-04 DIAGNOSIS — E78.5 HYPERLIPIDEMIA, UNSPECIFIED HYPERLIPIDEMIA TYPE: ICD-10-CM

## 2022-10-04 DIAGNOSIS — N40.0 BPH WITH ELEVATED PSA: ICD-10-CM

## 2022-10-04 DIAGNOSIS — R97.20 BPH WITH ELEVATED PSA: ICD-10-CM

## 2022-10-04 DIAGNOSIS — R73.9 HYPERGLYCEMIA: ICD-10-CM

## 2022-10-04 DIAGNOSIS — R97.20 ELEVATED PSA: ICD-10-CM

## 2022-10-04 LAB
ALBUMIN SERPL BCP-MCNC: 4.2 G/DL (ref 3.5–5.2)
ALP SERPL-CCNC: 84 U/L (ref 55–135)
ALT SERPL W/O P-5'-P-CCNC: 37 U/L (ref 10–44)
ANION GAP SERPL CALC-SCNC: 9 MMOL/L (ref 8–16)
AST SERPL-CCNC: 26 U/L (ref 10–40)
BASOPHILS # BLD AUTO: 0.02 K/UL (ref 0–0.2)
BASOPHILS NFR BLD: 0.4 % (ref 0–1.9)
BILIRUB SERPL-MCNC: 0.6 MG/DL (ref 0.1–1)
BUN SERPL-MCNC: 19 MG/DL (ref 8–23)
CALCIUM SERPL-MCNC: 9.6 MG/DL (ref 8.7–10.5)
CHLORIDE SERPL-SCNC: 100 MMOL/L (ref 95–110)
CHOLEST SERPL-MCNC: 156 MG/DL (ref 120–199)
CHOLEST/HDLC SERPL: 2.7 {RATIO} (ref 2–5)
CO2 SERPL-SCNC: 26 MMOL/L (ref 23–29)
CREAT SERPL-MCNC: 1.1 MG/DL (ref 0.5–1.4)
CREAT SERPL-MCNC: 1.1 MG/DL (ref 0.5–1.4)
DIFFERENTIAL METHOD: NORMAL
EOSINOPHIL # BLD AUTO: 0.2 K/UL (ref 0–0.5)
EOSINOPHIL NFR BLD: 3.5 % (ref 0–8)
ERYTHROCYTE [DISTWIDTH] IN BLOOD BY AUTOMATED COUNT: 12.8 % (ref 11.5–14.5)
EST. GFR  (NO RACE VARIABLE): >60 ML/MIN/1.73 M^2
EST. GFR  (NO RACE VARIABLE): >60 ML/MIN/1.73 M^2
ESTIMATED AVG GLUCOSE: 108 MG/DL (ref 68–131)
GLUCOSE SERPL-MCNC: 88 MG/DL (ref 70–110)
HBA1C MFR BLD: 5.4 % (ref 4–5.6)
HCT VFR BLD AUTO: 43.5 % (ref 40–54)
HDLC SERPL-MCNC: 58 MG/DL (ref 40–75)
HDLC SERPL: 37.2 % (ref 20–50)
HGB BLD-MCNC: 14.4 G/DL (ref 14–18)
IMM GRANULOCYTES # BLD AUTO: 0.02 K/UL (ref 0–0.04)
IMM GRANULOCYTES NFR BLD AUTO: 0.4 % (ref 0–0.5)
LDLC SERPL CALC-MCNC: 84.2 MG/DL (ref 63–159)
LYMPHOCYTES # BLD AUTO: 1.2 K/UL (ref 1–4.8)
LYMPHOCYTES NFR BLD: 24.2 % (ref 18–48)
MCH RBC QN AUTO: 30.8 PG (ref 27–31)
MCHC RBC AUTO-ENTMCNC: 33.1 G/DL (ref 32–36)
MCV RBC AUTO: 93 FL (ref 82–98)
MONOCYTES # BLD AUTO: 0.5 K/UL (ref 0.3–1)
MONOCYTES NFR BLD: 10.1 % (ref 4–15)
NEUTROPHILS # BLD AUTO: 3.2 K/UL (ref 1.8–7.7)
NEUTROPHILS NFR BLD: 61.4 % (ref 38–73)
NONHDLC SERPL-MCNC: 98 MG/DL
NRBC BLD-RTO: 0 /100 WBC
PLATELET # BLD AUTO: 204 K/UL (ref 150–450)
PMV BLD AUTO: 10.1 FL (ref 9.2–12.9)
POTASSIUM SERPL-SCNC: 4.8 MMOL/L (ref 3.5–5.1)
PROT SERPL-MCNC: 6.9 G/DL (ref 6–8.4)
RBC # BLD AUTO: 4.68 M/UL (ref 4.6–6.2)
SODIUM SERPL-SCNC: 135 MMOL/L (ref 136–145)
TRIGL SERPL-MCNC: 69 MG/DL (ref 30–150)
WBC # BLD AUTO: 5.13 K/UL (ref 3.9–12.7)

## 2022-10-04 PROCEDURE — 84153 ASSAY OF PSA TOTAL: CPT | Performed by: UROLOGY

## 2022-10-04 PROCEDURE — 36415 COLL VENOUS BLD VENIPUNCTURE: CPT | Mod: PO | Performed by: UROLOGY

## 2022-10-04 PROCEDURE — 84154 ASSAY OF PSA FREE: CPT | Performed by: UROLOGY

## 2022-10-04 PROCEDURE — 85025 COMPLETE CBC W/AUTO DIFF WBC: CPT | Performed by: FAMILY MEDICINE

## 2022-10-04 PROCEDURE — 80053 COMPREHEN METABOLIC PANEL: CPT | Performed by: FAMILY MEDICINE

## 2022-10-04 PROCEDURE — 80061 LIPID PANEL: CPT | Performed by: FAMILY MEDICINE

## 2022-10-04 PROCEDURE — 83036 HEMOGLOBIN GLYCOSYLATED A1C: CPT | Performed by: FAMILY MEDICINE

## 2022-10-05 LAB
PROSTATE SPECIFIC ANTIGEN, TOTAL: 3.1 NG/ML (ref 0–4)
PSA FREE MFR SERPL: 35.81 %
PSA FREE SERPL-MCNC: 1.11 NG/ML (ref 0–1.5)

## 2022-10-06 ENCOUNTER — OFFICE VISIT (OUTPATIENT)
Dept: FAMILY MEDICINE | Facility: CLINIC | Age: 62
End: 2022-10-06
Payer: COMMERCIAL

## 2022-10-06 ENCOUNTER — CLINICAL SUPPORT (OUTPATIENT)
Dept: UROLOGY | Facility: CLINIC | Age: 62
End: 2022-10-06
Payer: COMMERCIAL

## 2022-10-06 ENCOUNTER — PATIENT MESSAGE (OUTPATIENT)
Dept: ADMINISTRATIVE | Facility: OTHER | Age: 62
End: 2022-10-06
Payer: COMMERCIAL

## 2022-10-06 ENCOUNTER — PATIENT MESSAGE (OUTPATIENT)
Dept: FAMILY MEDICINE | Facility: CLINIC | Age: 62
End: 2022-10-06

## 2022-10-06 DIAGNOSIS — N40.1 BPH WITH OBSTRUCTION/LOWER URINARY TRACT SYMPTOMS: Primary | ICD-10-CM

## 2022-10-06 DIAGNOSIS — N13.8 BPH WITH OBSTRUCTION/LOWER URINARY TRACT SYMPTOMS: Primary | ICD-10-CM

## 2022-10-06 DIAGNOSIS — E78.5 HYPERLIPIDEMIA, UNSPECIFIED HYPERLIPIDEMIA TYPE: ICD-10-CM

## 2022-10-06 DIAGNOSIS — R40.0 DAYTIME SOMNOLENCE: Primary | ICD-10-CM

## 2022-10-06 DIAGNOSIS — I10 GOOD HYPERTENSION CONTROL: ICD-10-CM

## 2022-10-06 DIAGNOSIS — R73.9 HYPERGLYCEMIA: ICD-10-CM

## 2022-10-06 DIAGNOSIS — N40.0 BPH WITH ELEVATED PSA: ICD-10-CM

## 2022-10-06 DIAGNOSIS — R97.20 BPH WITH ELEVATED PSA: ICD-10-CM

## 2022-10-06 LAB — POC RESIDUAL URINE VOLUME: 0 ML (ref 0–100)

## 2022-10-06 PROCEDURE — 99499 NO LOS: ICD-10-PCS | Mod: S$GLB,,, | Performed by: UROLOGY

## 2022-10-06 PROCEDURE — 1159F MED LIST DOCD IN RCRD: CPT | Mod: CPTII,95,, | Performed by: FAMILY MEDICINE

## 2022-10-06 PROCEDURE — 99214 OFFICE O/P EST MOD 30 MIN: CPT | Mod: 95,,, | Performed by: FAMILY MEDICINE

## 2022-10-06 PROCEDURE — 3060F PR POS MICROALBUMINURIA RESULT DOCUMENTED/REVIEW: ICD-10-PCS | Mod: CPTII,95,, | Performed by: FAMILY MEDICINE

## 2022-10-06 PROCEDURE — 1159F PR MEDICATION LIST DOCUMENTED IN MEDICAL RECORD: ICD-10-PCS | Mod: CPTII,95,, | Performed by: FAMILY MEDICINE

## 2022-10-06 PROCEDURE — 3044F HG A1C LEVEL LT 7.0%: CPT | Mod: CPTII,95,, | Performed by: FAMILY MEDICINE

## 2022-10-06 PROCEDURE — 4010F ACE/ARB THERAPY RXD/TAKEN: CPT | Mod: CPTII,95,, | Performed by: FAMILY MEDICINE

## 2022-10-06 PROCEDURE — 1160F PR REVIEW ALL MEDS BY PRESCRIBER/CLIN PHARMACIST DOCUMENTED: ICD-10-PCS | Mod: CPTII,95,, | Performed by: FAMILY MEDICINE

## 2022-10-06 PROCEDURE — 3044F PR MOST RECENT HEMOGLOBIN A1C LEVEL <7.0%: ICD-10-PCS | Mod: CPTII,95,, | Performed by: FAMILY MEDICINE

## 2022-10-06 PROCEDURE — 99214 PR OFFICE/OUTPT VISIT, EST, LEVL IV, 30-39 MIN: ICD-10-PCS | Mod: 95,,, | Performed by: FAMILY MEDICINE

## 2022-10-06 PROCEDURE — 51798 POCT BLADDER SCAN: ICD-10-PCS | Mod: S$GLB,,, | Performed by: UROLOGY

## 2022-10-06 PROCEDURE — 51741 ELECTRO-UROFLOWMETRY FIRST: CPT | Mod: S$GLB,,, | Performed by: UROLOGY

## 2022-10-06 PROCEDURE — 51798 US URINE CAPACITY MEASURE: CPT | Mod: S$GLB,,, | Performed by: UROLOGY

## 2022-10-06 PROCEDURE — 99499 UNLISTED E&M SERVICE: CPT | Mod: S$GLB,,, | Performed by: UROLOGY

## 2022-10-06 PROCEDURE — 4010F PR ACE/ARB THEARPY RXD/TAKEN: ICD-10-PCS | Mod: CPTII,95,, | Performed by: FAMILY MEDICINE

## 2022-10-06 PROCEDURE — 51741 PR UROFLOWMETRY, COMPLEX: ICD-10-PCS | Mod: S$GLB,,, | Performed by: UROLOGY

## 2022-10-06 PROCEDURE — 1160F RVW MEDS BY RX/DR IN RCRD: CPT | Mod: CPTII,95,, | Performed by: FAMILY MEDICINE

## 2022-10-06 PROCEDURE — 3066F NEPHROPATHY DOC TX: CPT | Mod: CPTII,95,, | Performed by: FAMILY MEDICINE

## 2022-10-06 PROCEDURE — 3066F PR DOCUMENTATION OF TREATMENT FOR NEPHROPATHY: ICD-10-PCS | Mod: CPTII,95,, | Performed by: FAMILY MEDICINE

## 2022-10-06 PROCEDURE — 3060F POS MICROALBUMINURIA REV: CPT | Mod: CPTII,95,, | Performed by: FAMILY MEDICINE

## 2022-10-06 RX ORDER — LOSARTAN POTASSIUM 100 MG/1
100 TABLET ORAL DAILY
Qty: 90 TABLET | Refills: 3 | Status: SHIPPED | OUTPATIENT
Start: 2022-10-06 | End: 2022-11-19 | Stop reason: SDUPTHER

## 2022-10-06 RX ORDER — ATORVASTATIN CALCIUM 40 MG/1
40 TABLET, FILM COATED ORAL DAILY
Qty: 90 TABLET | Refills: 3 | Status: SHIPPED | OUTPATIENT
Start: 2022-10-06 | End: 2022-11-19 | Stop reason: SDUPTHER

## 2022-10-06 NOTE — PROGRESS NOTES
Subjective:       Patient ID: Parth Juarez is a 62 y.o. male.    Chief Complaint: No chief complaint on file.    HPI  Review of Systems   Constitutional:  Positive for fatigue. Negative for unexpected weight change.   Respiratory:  Negative for chest tightness and shortness of breath.    Cardiovascular:  Negative for chest pain, palpitations and leg swelling.   Gastrointestinal:  Negative for abdominal pain.   Musculoskeletal:  Negative for arthralgias.   Neurological:  Negative for dizziness, syncope, light-headedness and headaches.     Patient Active Problem List   Diagnosis    Hyperlipidemia    Good hypertension control    Exercise-induced asthma    Positive for microalbuminuria    Hyperglycemia    Anemia    NICCI (iron deficiency anemia)    ADHD (attention deficit hyperactivity disorder), combined type    Anxiety    BPH with elevated PSA     Patient is here for telemedicine visit  The patient location is: [place]  The chief complaint leading to consultation is: f/u  Visit type: Virtual visit with synchronous audio and video  Total time spent with patient: 10-20 min  Each patient to whom he or she provides medical services by telemedicine is:  (1) informed of the relationship between the physician and patient and the respective role of any other health care provider with respect to management of the patient; and (2) notified that he or she may decline to receive medical services by telemedicine and may withdraw from such care at any time.    Notes: see below   AUDIO VISIT ONLY? no  Concerned about Snoring, daytime somnolence and fatigue particularly in afternoons.  No witnessed apnea.      Reviewed labs 10/22  A1c 5.4  Lipids  at goal   urine ma elevated -on ARB        Urology Dr. Gibbs/now Herbie  monitoring for mild LUTS due to BPH .  Has occ urgency and mild urge incontinence. Feels he empties well. Urinates once at night. Nl stream. Nl hesitancy. PSA 2/22 6.3.  Now s/p biopsy 3/22/22- 1 specimens - all benign  With  some chronic inflammation        HTN- BP has been low 110s/60s. HCTZ now stopped. BP running < 130/80 consistently on digital htn 63%  Objective:      Physical Exam  Vitals and nursing note reviewed.   Constitutional:       Appearance: He is well-developed.   Cardiovascular:      Rate and Rhythm: Normal rate and regular rhythm.      Heart sounds: Normal heart sounds.   Pulmonary:      Effort: Pulmonary effort is normal.      Breath sounds: Normal breath sounds.   Skin:     General: Skin is warm and dry.   Neurological:      Mental Status: He is alert and oriented to person, place, and time.       Assessment:       1. Daytime somnolence    2. Hyperlipidemia, unspecified hyperlipidemia type    3. Hyperglycemia    4. BPH with elevated PSA    5. Good hypertension control        Plan:      1. Daytime somnolence  Screen and treat as indicated:  R/o ELLIOT  - Home Sleep Studies; Future    2. Hyperlipidemia, unspecified hyperlipidemia type  Controlled on current medications.  Continue current medications.    - atorvastatin (LIPITOR) 40 MG tablet; Take 1 tablet (40 mg total) by mouth once daily.  Dispense: 90 tablet; Refill: 3  - CBC Auto Differential; Future  - Comprehensive Metabolic Panel; Future  - Lipid Panel; Future    3. Hyperglycemia  Controlled with diet  - Hemoglobin A1C; Future    4. BPH with elevated PSA  Cont current mgmt and monitoring    5. Good hypertension control  Controlled on current medications.  Continue current medications.          Time spent with patient: 20 minutes    Patient with be reevaluated in 6 months or sooner prn    Greater than 50% of this visit was spent counseling as described in above documentation:Yes

## 2022-10-06 NOTE — PROGRESS NOTES
AUASS:  Incomplete emptying- 0  Frequency- 0  Intermittency- 0  Urgency- 2  Weak Stream- 1  Straining- 0  Sleeping- 1  Total-  4  QOL- 0  PVR- 0 ml     Uroflow results   Voiding time: 16.0s,   Flow time: 15.3s,   TTP flow: 31.s,   Peak flowrate: 16.7 mL/s,   Average flowrate: 8.4mL/s,   Intervals: 2,   Voided volume: 128 mL,   Pattern of curve: to be determined by physician.

## 2022-10-25 ENCOUNTER — PROCEDURE VISIT (OUTPATIENT)
Dept: SLEEP MEDICINE | Facility: HOSPITAL | Age: 62
End: 2022-10-25
Attending: FAMILY MEDICINE
Payer: COMMERCIAL

## 2022-10-25 DIAGNOSIS — R40.0 DAYTIME SOMNOLENCE: ICD-10-CM

## 2022-10-25 PROCEDURE — 95806 SLEEP STUDY UNATT&RESP EFFT: CPT

## 2022-11-01 ENCOUNTER — TELEPHONE (OUTPATIENT)
Dept: FAMILY MEDICINE | Facility: CLINIC | Age: 62
End: 2022-11-01
Payer: COMMERCIAL

## 2022-11-01 ENCOUNTER — PATIENT MESSAGE (OUTPATIENT)
Dept: FAMILY MEDICINE | Facility: CLINIC | Age: 62
End: 2022-11-01
Payer: COMMERCIAL

## 2022-11-01 DIAGNOSIS — G47.33 OSA ON CPAP: Primary | ICD-10-CM

## 2022-11-01 NOTE — TELEPHONE ENCOUNTER
Notify patient: sleep study showed significant sleep apnea. An in lab sleep study is recommended. Ordered.  Please schedule

## 2022-11-02 ENCOUNTER — PATIENT MESSAGE (OUTPATIENT)
Dept: UROLOGY | Facility: CLINIC | Age: 62
End: 2022-11-02
Payer: COMMERCIAL

## 2022-11-03 ENCOUNTER — PATIENT MESSAGE (OUTPATIENT)
Dept: FAMILY MEDICINE | Facility: CLINIC | Age: 62
End: 2022-11-03
Payer: COMMERCIAL

## 2022-11-03 ENCOUNTER — TELEPHONE (OUTPATIENT)
Dept: FAMILY MEDICINE | Facility: CLINIC | Age: 62
End: 2022-11-03
Payer: COMMERCIAL

## 2022-11-03 NOTE — PROGRESS NOTES
[facility] Urology Progress Note     Parth Juarez is a 62 y.o. male who presents for follow up of BPH and elevated psa, s/p cystoscopy and prostate biopsy     PMHx: HTN, HLD, NICCI, ADD. 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 No famHx CaP but father had bladder ca  When psa crispin on 3/20/16 to 4.3 he was placed on finasteride 5mg daily and repeat psa 10/11/16 was 2.8 (=5.6)  When he was seen again in 2018 by Dr Gibbs it was noted : Dr Styles put the patient on finasteride 5 mg daily with some improvement in LUTS and last PSA 3/21/2018 and was 4.4 and this was before he started taking finasteride. Repeat psa 9/19/18 was 3.1 (=6.2)  Last saw Dr Gibbs 2/2020 noting progressive LUTS with urgency, frequency, PV dribble though also on diuretic and NTF x0     On my recent evaluation, noted after diuretic more frequency/urgency. Better somewhat after decreasing dose from 25 to 12.5  Felt like wasn't emptying as well when starting wellbutrin. Has been on finasteride monotherapy only in past, but stopped it completely given contraindication to blood donation PSA in 2009 at biopsy was 3.7, age adjusted elevation. Early in AM does have some UUI. Even tried shield pad. Occ AMs if cant make it on time   Heavy coffee in AM, not much during day, no soft drink. Metamucil daily to prevent constipation bc was a problem  SANDY 35-40g benign. Started flomax with conservative recs for urgency frequency    Cysto/Prostate Biopsy 3/22/22 (of note took his flomax at 4am and BP was 90s/50s on triage)  PSA: 6.3, VOLUME: 83.9cc, U/S:  scattered punctate hypoechoities, no median lobe, normal SVs;  "CYSTO: significant kissing Lateral lobe obstruction centrally and distally, more anterolateral proximally though asymm R > L,  with minimal intravesical extension without median lobe. Mild scattered trabeculations   PATH: 14 cores BPH    He returns today noting  Did well after biopsy. Minimal residual small blood at initiation of voiding. Clearing  Now on flomax, having stopped his diuretic, his BP is controlled and his urgency frequency have largely resolved  Decreased caffeine some  AUA SS 1/0, delighted (1 for frequency.)       Focused Physical Exam:    Vitals:    04/04/22 1612   BP: 115/73   Pulse: (!) 57   Resp: 18     Body mass index is 29 kg/m². Weight: 84 kg (185 lb 3 oz) Height: 5' 7" (170.2 cm)     Abdomen: Soft, non-tender, nondistended, no CVA tenderness    ASSESSMENT   1. BPH with obstruction/lower urinary tract symptoms     2. BPH with elevated PSA  PSA, Total and Free    PSA, Total and Free       Plan    Prostate volume size nearly doubled from 43 to 83 grams from initial eval 2009 to now. Biopsy BPH only  PSA density very normal.   Previously on finasteride monotherapy, which he dc-ed.  Again reviewed little benefit of finasteride monotherapy, and is indicated to add to alpha-blocker for dual medical therapy symptoms for uncontrolled and prostate greater than 40 g.  Now on Flomax, obstructive and bothersome obstructive lower urinary tract symptoms have essentially resolved.  He was having some blood pressure effects from the Flomax, potentiated by his diuretics, but notes in the interim has discontinued his diuretics and on Flomax monotherapy is blood pressure is well controlled, noted to be 115 systolic today, and his urinary quality of life has significantly improved on discontinuing the diuretic and urgency and frequency have also resolved.  No need to restart finasteride at this time simply because his prostate is a large doses symptoms are well controlled on Flomax, and will continue to follow " his PSA closely with free and total PSA, discussing the significance of free PSA percentage.  At 6 month visit to lab for PSA free and total, will also have him do a nurse visit to repeat a symptom score and PVR to make sure that his symptoms are still well controlled he is emptying well on Flomax monotherapy major need to add finasteride at that time, and as long as he is stable, will see him at annual visit with an additional PSA free and total prior.    Total time spent in/on encounter today, including face to face time with patient, counseling, medical record review, interpretation of tests/results, , and treatment plan coordination: 30 minutes          Detail Level: Detailed General Sunscreen Counseling: Sun screen (SPF 30 or greater) should be applied when outdoors for more than 10-15 minutes, especially during peak hours (between 10am and 2pm). If swimming or exercising, sunscreen needs to be reapplied every hour. Physical blocking sunscreens block a wider range of UV radiation.

## 2022-11-03 NOTE — PROGRESS NOTES
Uroflow reviewed with nonobstructed pattern and efficient voiding curve.   Flowrates may appear lower on numbers given mild low voided volume of 128cc  Good symptomatic response to meds, noting they help 10/10  Psa normal and free psa >30%  As per result note on psa will set annual visit with repeat psa f/t prior

## 2022-11-08 ENCOUNTER — TELEPHONE (OUTPATIENT)
Dept: FAMILY MEDICINE | Facility: CLINIC | Age: 62
End: 2022-11-08
Payer: COMMERCIAL

## 2022-11-08 NOTE — TELEPHONE ENCOUNTER
----- Message from Hari Montenegro sent at 11/8/2022 11:44 AM CST -----  Regarding: CPAP Denied  Insurance has denied patient's CPAP Titration Sleep Study.  Please see denial scanned in  for denial reasons.    Thanks  Hari.

## 2023-01-03 ENCOUNTER — IMMUNIZATION (OUTPATIENT)
Dept: PRIMARY CARE CLINIC | Facility: CLINIC | Age: 63
End: 2023-01-03
Payer: COMMERCIAL

## 2023-01-03 DIAGNOSIS — Z23 NEED FOR VACCINATION: Primary | ICD-10-CM

## 2023-01-03 PROCEDURE — 91312 COVID-19, MRNA, LNP-S, BIVALENT BOOSTER, PF, 30 MCG/0.3 ML DOSE: ICD-10-PCS | Mod: S$GLB,,, | Performed by: FAMILY MEDICINE

## 2023-01-03 PROCEDURE — 0124A COVID-19, MRNA, LNP-S, BIVALENT BOOSTER, PF, 30 MCG/0.3 ML DOSE: CPT | Mod: S$GLB,,, | Performed by: FAMILY MEDICINE

## 2023-01-03 PROCEDURE — 91312 COVID-19, MRNA, LNP-S, BIVALENT BOOSTER, PF, 30 MCG/0.3 ML DOSE: CPT | Mod: S$GLB,,, | Performed by: FAMILY MEDICINE

## 2023-01-03 PROCEDURE — 0124A COVID-19, MRNA, LNP-S, BIVALENT BOOSTER, PF, 30 MCG/0.3 ML DOSE: ICD-10-PCS | Mod: S$GLB,,, | Performed by: FAMILY MEDICINE

## 2023-01-16 ENCOUNTER — TELEPHONE (OUTPATIENT)
Dept: FAMILY MEDICINE | Facility: CLINIC | Age: 63
End: 2023-01-16
Payer: COMMERCIAL

## 2023-01-16 ENCOUNTER — PATIENT MESSAGE (OUTPATIENT)
Dept: ADMINISTRATIVE | Facility: OTHER | Age: 63
End: 2023-01-16
Payer: COMMERCIAL

## 2023-01-16 DIAGNOSIS — G47.33 OSA ON CPAP: Primary | ICD-10-CM

## 2023-01-16 NOTE — TELEPHONE ENCOUNTER
Patient requests follow up with Dr. Mac to review sleep apnea treatment now on CPAP. Please schedule

## 2023-03-08 ENCOUNTER — OFFICE VISIT (OUTPATIENT)
Dept: OPTOMETRY | Facility: CLINIC | Age: 63
End: 2023-03-08
Payer: COMMERCIAL

## 2023-03-08 DIAGNOSIS — H25.13 NUCLEAR SCLEROSIS OF BOTH EYES: ICD-10-CM

## 2023-03-08 DIAGNOSIS — H18.413 ARCUS SENILIS OF BOTH CORNEAS: ICD-10-CM

## 2023-03-08 DIAGNOSIS — H11.001 PTERYGIUM OF RIGHT EYE: ICD-10-CM

## 2023-03-08 DIAGNOSIS — H52.7 REFRACTIVE ERROR: ICD-10-CM

## 2023-03-08 DIAGNOSIS — Z01.00 EXAMINATION OF EYES AND VISION: Primary | ICD-10-CM

## 2023-03-08 PROCEDURE — 99999 PR PBB SHADOW E&M-EST. PATIENT-LVL III: ICD-10-PCS | Mod: PBBFAC,,, | Performed by: OPTOMETRIST

## 2023-03-08 PROCEDURE — 92015 DETERMINE REFRACTIVE STATE: CPT | Mod: S$GLB,,, | Performed by: OPTOMETRIST

## 2023-03-08 PROCEDURE — 92015 PR REFRACTION: ICD-10-PCS | Mod: S$GLB,,, | Performed by: OPTOMETRIST

## 2023-03-08 PROCEDURE — 99999 PR PBB SHADOW E&M-EST. PATIENT-LVL III: CPT | Mod: PBBFAC,,, | Performed by: OPTOMETRIST

## 2023-03-08 PROCEDURE — 92014 PR EYE EXAM, EST PATIENT,COMPREHESV: ICD-10-PCS | Mod: S$GLB,,, | Performed by: OPTOMETRIST

## 2023-03-08 PROCEDURE — 92014 COMPRE OPH EXAM EST PT 1/>: CPT | Mod: S$GLB,,, | Performed by: OPTOMETRIST

## 2023-03-08 NOTE — PROGRESS NOTES
HPI    Pt here today for annual exam.   States no visual changes or complaints   but would still like updated rx today.    Denies any headaches or eye pain.    (-) allergies / dry eyes  (-) gtts  (-) floaters or light flashes  Last edited by Alia Jamil on 3/8/2023  7:08 AM.            Assessment /Plan     For exam results, see Encounter Report.    Examination of eyes and vision    Refractive error    Nuclear sclerosis of both eyes    Pterygium of right eye    Arcus senilis of both corneas      1. Examination of eyes and vision    2. Nuclear sclerosis of both eyes  Mild, not yet visually significant. Discussed possible ocular affects of cataracts. Acceptable BCVA OU. Discussed treatment options. Surgery not recommended at this time. Monitor yearly.     3. Refractive error  Dispensed updated spectacle Rx, mild change from previous. Discussed various spectacle lens options. Discussed adaptation period to new specs.    4. Pterygium of right eye  1 mm inferior nasal OD, not visually significant. Previous excision. Monitor yearly.     5. Arcus senilis of both corneas  Arcus OU, continue to monitor cholesterol levels.       RTC in 1 year for comprehensive eye exam, or sooner prn.

## 2023-03-17 RX ORDER — TAMSULOSIN HYDROCHLORIDE 0.4 MG/1
0.4 CAPSULE ORAL NIGHTLY
Qty: 30 CAPSULE | Refills: 11 | Status: SHIPPED | OUTPATIENT
Start: 2023-03-17 | End: 2023-07-17 | Stop reason: SDUPTHER

## 2023-04-07 ENCOUNTER — PATIENT MESSAGE (OUTPATIENT)
Dept: FAMILY MEDICINE | Facility: CLINIC | Age: 63
End: 2023-04-07
Payer: COMMERCIAL

## 2023-04-10 ENCOUNTER — TELEPHONE (OUTPATIENT)
Dept: FAMILY MEDICINE | Facility: CLINIC | Age: 63
End: 2023-04-10
Payer: COMMERCIAL

## 2023-04-11 ENCOUNTER — PATIENT MESSAGE (OUTPATIENT)
Dept: FAMILY MEDICINE | Facility: CLINIC | Age: 63
End: 2023-04-11
Payer: COMMERCIAL

## 2023-06-14 ENCOUNTER — LAB VISIT (OUTPATIENT)
Dept: LAB | Facility: HOSPITAL | Age: 63
End: 2023-06-14
Attending: FAMILY MEDICINE
Payer: COMMERCIAL

## 2023-06-14 DIAGNOSIS — R73.9 HYPERGLYCEMIA: ICD-10-CM

## 2023-06-14 DIAGNOSIS — E78.5 HYPERLIPIDEMIA, UNSPECIFIED HYPERLIPIDEMIA TYPE: ICD-10-CM

## 2023-06-14 LAB
ALBUMIN SERPL BCP-MCNC: 4.1 G/DL (ref 3.5–5.2)
ALP SERPL-CCNC: 87 U/L (ref 55–135)
ALT SERPL W/O P-5'-P-CCNC: 38 U/L (ref 10–44)
ANION GAP SERPL CALC-SCNC: 11 MMOL/L (ref 8–16)
AST SERPL-CCNC: 28 U/L (ref 10–40)
BASOPHILS # BLD AUTO: 0.03 K/UL (ref 0–0.2)
BASOPHILS NFR BLD: 0.5 % (ref 0–1.9)
BILIRUB SERPL-MCNC: 0.4 MG/DL (ref 0.1–1)
BUN SERPL-MCNC: 15 MG/DL (ref 8–23)
CALCIUM SERPL-MCNC: 9.9 MG/DL (ref 8.7–10.5)
CHLORIDE SERPL-SCNC: 101 MMOL/L (ref 95–110)
CHOLEST SERPL-MCNC: 156 MG/DL (ref 120–199)
CHOLEST/HDLC SERPL: 2.4 {RATIO} (ref 2–5)
CO2 SERPL-SCNC: 27 MMOL/L (ref 23–29)
CREAT SERPL-MCNC: 1.1 MG/DL (ref 0.5–1.4)
DIFFERENTIAL METHOD: NORMAL
EOSINOPHIL # BLD AUTO: 0.2 K/UL (ref 0–0.5)
EOSINOPHIL NFR BLD: 3.8 % (ref 0–8)
ERYTHROCYTE [DISTWIDTH] IN BLOOD BY AUTOMATED COUNT: 12.4 % (ref 11.5–14.5)
EST. GFR  (NO RACE VARIABLE): >60 ML/MIN/1.73 M^2
ESTIMATED AVG GLUCOSE: 105 MG/DL (ref 68–131)
GLUCOSE SERPL-MCNC: 79 MG/DL (ref 70–110)
HBA1C MFR BLD: 5.3 % (ref 4–5.6)
HCT VFR BLD AUTO: 45.9 % (ref 40–54)
HDLC SERPL-MCNC: 65 MG/DL (ref 40–75)
HDLC SERPL: 41.7 % (ref 20–50)
HGB BLD-MCNC: 14.7 G/DL (ref 14–18)
IMM GRANULOCYTES # BLD AUTO: 0.02 K/UL (ref 0–0.04)
IMM GRANULOCYTES NFR BLD AUTO: 0.3 % (ref 0–0.5)
LDLC SERPL CALC-MCNC: 77 MG/DL (ref 63–159)
LYMPHOCYTES # BLD AUTO: 2 K/UL (ref 1–4.8)
LYMPHOCYTES NFR BLD: 33.9 % (ref 18–48)
MCH RBC QN AUTO: 30.3 PG (ref 27–31)
MCHC RBC AUTO-ENTMCNC: 32 G/DL (ref 32–36)
MCV RBC AUTO: 95 FL (ref 82–98)
MONOCYTES # BLD AUTO: 0.8 K/UL (ref 0.3–1)
MONOCYTES NFR BLD: 13.1 % (ref 4–15)
NEUTROPHILS # BLD AUTO: 2.8 K/UL (ref 1.8–7.7)
NEUTROPHILS NFR BLD: 48.4 % (ref 38–73)
NONHDLC SERPL-MCNC: 91 MG/DL
NRBC BLD-RTO: 0 /100 WBC
PLATELET # BLD AUTO: 206 K/UL (ref 150–450)
PMV BLD AUTO: 10.4 FL (ref 9.2–12.9)
POTASSIUM SERPL-SCNC: 4.4 MMOL/L (ref 3.5–5.1)
PROT SERPL-MCNC: 7.1 G/DL (ref 6–8.4)
RBC # BLD AUTO: 4.85 M/UL (ref 4.6–6.2)
SODIUM SERPL-SCNC: 139 MMOL/L (ref 136–145)
TRIGL SERPL-MCNC: 70 MG/DL (ref 30–150)
WBC # BLD AUTO: 5.79 K/UL (ref 3.9–12.7)

## 2023-06-14 PROCEDURE — 83036 HEMOGLOBIN GLYCOSYLATED A1C: CPT | Performed by: FAMILY MEDICINE

## 2023-06-14 PROCEDURE — 80053 COMPREHEN METABOLIC PANEL: CPT | Performed by: FAMILY MEDICINE

## 2023-06-14 PROCEDURE — 80061 LIPID PANEL: CPT | Performed by: FAMILY MEDICINE

## 2023-06-14 PROCEDURE — 36415 COLL VENOUS BLD VENIPUNCTURE: CPT | Mod: PO | Performed by: FAMILY MEDICINE

## 2023-06-14 PROCEDURE — 85025 COMPLETE CBC W/AUTO DIFF WBC: CPT | Performed by: FAMILY MEDICINE

## 2023-06-19 DIAGNOSIS — Z12.5 PROSTATE CANCER SCREENING: Primary | ICD-10-CM

## 2023-06-21 ENCOUNTER — LAB VISIT (OUTPATIENT)
Dept: LAB | Facility: HOSPITAL | Age: 63
End: 2023-06-21
Attending: FAMILY MEDICINE
Payer: COMMERCIAL

## 2023-06-21 ENCOUNTER — OFFICE VISIT (OUTPATIENT)
Dept: FAMILY MEDICINE | Facility: CLINIC | Age: 63
End: 2023-06-21
Payer: COMMERCIAL

## 2023-06-21 VITALS
RESPIRATION RATE: 18 BRPM | HEIGHT: 67 IN | HEART RATE: 73 BPM | TEMPERATURE: 99 F | OXYGEN SATURATION: 96 % | BODY MASS INDEX: 29.76 KG/M2 | SYSTOLIC BLOOD PRESSURE: 117 MMHG | WEIGHT: 189.63 LBS | DIASTOLIC BLOOD PRESSURE: 60 MMHG

## 2023-06-21 DIAGNOSIS — N40.0 BPH WITH ELEVATED PSA: ICD-10-CM

## 2023-06-21 DIAGNOSIS — R97.20 BPH WITH ELEVATED PSA: ICD-10-CM

## 2023-06-21 DIAGNOSIS — G89.29 CHRONIC RIGHT-SIDED LOW BACK PAIN WITHOUT SCIATICA: ICD-10-CM

## 2023-06-21 DIAGNOSIS — I10 HYPERTENSION, UNSPECIFIED TYPE: ICD-10-CM

## 2023-06-21 DIAGNOSIS — M54.50 CHRONIC RIGHT-SIDED LOW BACK PAIN WITHOUT SCIATICA: ICD-10-CM

## 2023-06-21 DIAGNOSIS — Z00.00 ANNUAL PHYSICAL EXAM: Primary | ICD-10-CM

## 2023-06-21 DIAGNOSIS — E78.5 HYPERLIPIDEMIA, UNSPECIFIED HYPERLIPIDEMIA TYPE: ICD-10-CM

## 2023-06-21 DIAGNOSIS — R73.9 HYPERGLYCEMIA: ICD-10-CM

## 2023-06-21 DIAGNOSIS — D50.9 IRON DEFICIENCY ANEMIA, UNSPECIFIED IRON DEFICIENCY ANEMIA TYPE: ICD-10-CM

## 2023-06-21 DIAGNOSIS — R80.9 POSITIVE FOR MICROALBUMINURIA: ICD-10-CM

## 2023-06-21 DIAGNOSIS — Z12.5 PROSTATE CANCER SCREENING: ICD-10-CM

## 2023-06-21 DIAGNOSIS — G47.33 OSA ON CPAP: ICD-10-CM

## 2023-06-21 DIAGNOSIS — Z13.6 SCREENING FOR AAA (ABDOMINAL AORTIC ANEURYSM): ICD-10-CM

## 2023-06-21 DIAGNOSIS — Z23 NEED FOR PNEUMOCOCCAL VACCINATION: ICD-10-CM

## 2023-06-21 PROCEDURE — 1101F PR PT FALLS ASSESS DOC 0-1 FALLS W/OUT INJ PAST YR: ICD-10-PCS | Mod: CPTII,S$GLB,, | Performed by: FAMILY MEDICINE

## 2023-06-21 PROCEDURE — 3008F PR BODY MASS INDEX (BMI) DOCUMENTED: ICD-10-PCS | Mod: CPTII,S$GLB,, | Performed by: FAMILY MEDICINE

## 2023-06-21 PROCEDURE — 4010F ACE/ARB THERAPY RXD/TAKEN: CPT | Mod: CPTII,S$GLB,, | Performed by: FAMILY MEDICINE

## 2023-06-21 PROCEDURE — 99999 PR PBB SHADOW E&M-EST. PATIENT-LVL IV: CPT | Mod: PBBFAC,,, | Performed by: FAMILY MEDICINE

## 2023-06-21 PROCEDURE — 4010F PR ACE/ARB THEARPY RXD/TAKEN: ICD-10-PCS | Mod: CPTII,S$GLB,, | Performed by: FAMILY MEDICINE

## 2023-06-21 PROCEDURE — 84153 ASSAY OF PSA TOTAL: CPT | Performed by: FAMILY MEDICINE

## 2023-06-21 PROCEDURE — 3074F SYST BP LT 130 MM HG: CPT | Mod: CPTII,S$GLB,, | Performed by: FAMILY MEDICINE

## 2023-06-21 PROCEDURE — 3044F PR MOST RECENT HEMOGLOBIN A1C LEVEL <7.0%: ICD-10-PCS | Mod: CPTII,S$GLB,, | Performed by: FAMILY MEDICINE

## 2023-06-21 PROCEDURE — 1159F PR MEDICATION LIST DOCUMENTED IN MEDICAL RECORD: ICD-10-PCS | Mod: CPTII,S$GLB,, | Performed by: FAMILY MEDICINE

## 2023-06-21 PROCEDURE — 90677 PCV20 VACCINE IM: CPT | Mod: S$GLB,,, | Performed by: FAMILY MEDICINE

## 2023-06-21 PROCEDURE — 99999 PR PBB SHADOW E&M-EST. PATIENT-LVL IV: ICD-10-PCS | Mod: PBBFAC,,, | Performed by: FAMILY MEDICINE

## 2023-06-21 PROCEDURE — 1160F PR REVIEW ALL MEDS BY PRESCRIBER/CLIN PHARMACIST DOCUMENTED: ICD-10-PCS | Mod: CPTII,S$GLB,, | Performed by: FAMILY MEDICINE

## 2023-06-21 PROCEDURE — 3288F PR FALLS RISK ASSESSMENT DOCUMENTED: ICD-10-PCS | Mod: CPTII,S$GLB,, | Performed by: FAMILY MEDICINE

## 2023-06-21 PROCEDURE — 1101F PT FALLS ASSESS-DOCD LE1/YR: CPT | Mod: CPTII,S$GLB,, | Performed by: FAMILY MEDICINE

## 2023-06-21 PROCEDURE — 3078F PR MOST RECENT DIASTOLIC BLOOD PRESSURE < 80 MM HG: ICD-10-PCS | Mod: CPTII,S$GLB,, | Performed by: FAMILY MEDICINE

## 2023-06-21 PROCEDURE — 99397 PER PM REEVAL EST PAT 65+ YR: CPT | Mod: 25,S$GLB,, | Performed by: FAMILY MEDICINE

## 2023-06-21 PROCEDURE — 90471 PNEUMOCOCCAL CONJUGATE VACCINE 20-VALENT: ICD-10-PCS | Mod: S$GLB,,, | Performed by: FAMILY MEDICINE

## 2023-06-21 PROCEDURE — 3008F BODY MASS INDEX DOCD: CPT | Mod: CPTII,S$GLB,, | Performed by: FAMILY MEDICINE

## 2023-06-21 PROCEDURE — 1160F RVW MEDS BY RX/DR IN RCRD: CPT | Mod: CPTII,S$GLB,, | Performed by: FAMILY MEDICINE

## 2023-06-21 PROCEDURE — 3288F FALL RISK ASSESSMENT DOCD: CPT | Mod: CPTII,S$GLB,, | Performed by: FAMILY MEDICINE

## 2023-06-21 PROCEDURE — 1159F MED LIST DOCD IN RCRD: CPT | Mod: CPTII,S$GLB,, | Performed by: FAMILY MEDICINE

## 2023-06-21 PROCEDURE — 90471 IMMUNIZATION ADMIN: CPT | Mod: S$GLB,,, | Performed by: FAMILY MEDICINE

## 2023-06-21 PROCEDURE — 3078F DIAST BP <80 MM HG: CPT | Mod: CPTII,S$GLB,, | Performed by: FAMILY MEDICINE

## 2023-06-21 PROCEDURE — 36415 COLL VENOUS BLD VENIPUNCTURE: CPT | Mod: PO | Performed by: FAMILY MEDICINE

## 2023-06-21 PROCEDURE — 90677 PNEUMOCOCCAL CONJUGATE VACCINE 20-VALENT: ICD-10-PCS | Mod: S$GLB,,, | Performed by: FAMILY MEDICINE

## 2023-06-21 PROCEDURE — 3044F HG A1C LEVEL LT 7.0%: CPT | Mod: CPTII,S$GLB,, | Performed by: FAMILY MEDICINE

## 2023-06-21 PROCEDURE — 99397 PR PREVENTIVE VISIT,EST,65 & OVER: ICD-10-PCS | Mod: 25,S$GLB,, | Performed by: FAMILY MEDICINE

## 2023-06-21 PROCEDURE — 3074F PR MOST RECENT SYSTOLIC BLOOD PRESSURE < 130 MM HG: ICD-10-PCS | Mod: CPTII,S$GLB,, | Performed by: FAMILY MEDICINE

## 2023-06-21 NOTE — PROGRESS NOTES
Subjective:       Patient ID: Parth Juarez is a 63 y.o. male.    Chief Complaint: Follow-up (6mth f/u)    HPI  Review of Systems   Constitutional:  Negative for fatigue and unexpected weight change.   Respiratory:  Negative for chest tightness and shortness of breath.    Cardiovascular:  Negative for chest pain, palpitations and leg swelling.   Gastrointestinal:  Negative for abdominal pain.   Musculoskeletal:  Negative for arthralgias.   Neurological:  Negative for dizziness, syncope, light-headedness and headaches.     Patient Active Problem List   Diagnosis    Hyperlipidemia    Good hypertension control    Exercise-induced asthma    Positive for microalbuminuria    Hyperglycemia    Anemia    NICCI (iron deficiency anemia)    ADHD (attention deficit hyperactivity disorder), combined type    Anxiety    BPH with elevated PSA     Patient is here for a chronic conditions follow up.    C/o right lower back pain-ache worse with standing for prolonged periods.  Not painful when walking. Bothering him at night as well.  Has some mild pain medial foot but no paresthesias. No known trauma      Pulm Dr. Mac ELLIOT  on CPAP now.       Reviewed labs 6/2023  A1c 5.3  Lipids  at goal   urine ma elevated -on ARB   Eye dr. Dawn 3/8/23      Urology Dr. Gibbs/now Herbie  monitoring for mild LUTS due to BPH .  Has occ urgency and mild urge incontinence. Feels he empties well. Urinates once at night. Nl stream. Nl hesitancy. PSA 2/22 6.3.  Now s/p biopsy 3/22/22- 1 specimens - all benign  With some chronic inflammation        HTN- BP has been low 110s/60s. HCTZ now stopped. BP running < 130/80 consistently on digital htn 86%  Objective:      Physical Exam  Vitals and nursing note reviewed.   Constitutional:       Appearance: He is well-developed.   Cardiovascular:      Rate and Rhythm: Normal rate and regular rhythm.      Heart sounds: Normal heart sounds.   Pulmonary:      Effort: Pulmonary effort is normal.      Breath sounds:  Normal breath sounds.   Musculoskeletal:      Lumbar back: No swelling, edema or tenderness. Normal range of motion.        Back:    Skin:     General: Skin is warm and dry.   Neurological:      Mental Status: He is alert and oriented to person, place, and time.       Assessment:       1. Annual physical exam    2. Hyperlipidemia, unspecified hyperlipidemia type    3. Hyperglycemia    4. ELLIOT on CPAP    5. Hypertension, unspecified type    6. BPH with elevated PSA    7. Positive for microalbuminuria    8. Iron deficiency anemia, unspecified iron deficiency anemia type    9. Need for pneumococcal vaccination    10. Screening for AAA (abdominal aortic aneurysm)    11. Chronic right-sided low back pain without sciatica        Plan:         1. Annual physical exam  Discussed health maintenance guidelines appropriate for age.        2. Hyperlipidemia, unspecified hyperlipidemia type  Controlled on current medications.  Continue current medications.    - Comprehensive Metabolic Panel; Future  - Lipid Panel; Future    3. Hyperglycemia  Controlled with diet .  Your blood sugar is borderline high.  This means you are at risk for developing type 2 diabetes mellitus.  To lessen your risk you should exercise regularly, avoid excess carbohydrates and work toward a body mass index of less than 25.      - CBC Auto Differential; Future  - Hemoglobin A1C; Future  - Microalbumin/Creatinine Ratio, Urine; Future    4. ELLIOT on CPAP  Cont cpap use consistently    5. Hypertension, unspecified type  Controlled on current medications.  Continue current medications.      6. BPH with elevated PSA  Cont current mgmt and monitor PSA    7. Positive for microalbuminuria  Screen and treat as indicated:    - Microalbumin/Creatinine Ratio, Urine; Future    8. Iron deficiency anemia, unspecified iron deficiency anemia type  Cont monitoring    9. Need for pneumococcal vaccination  Immunize today.  Counseled patient on risks, benefits and side effects.   Patient elected to proceed with vaccination.    - (In Office Administered) Pneumococcal Conjugate Vaccine (20 Valent) (IM)    10. Screening for AAA (abdominal aortic aneurysm)  Screen and treat as indicated:    - US AAA Screening; Future    11. Chronic right-sided low back pain without sciatica  SI joint pain versus lumbar facet arthropathy  Refer for eval and treat and dry needling modality  - Ambulatory referral/consult to Physical/Occupational Therapy; Future      Time spent with patient: 20 minutes    Patient with be reevaluated in 6 months or sooner prn    Greater than 50% of this visit was spent counseling as described in above documentation:Yes

## 2023-06-21 NOTE — PROGRESS NOTES
Identified pts name and , prevnar 20 vaccine administered IM, pt tolerated well. Aseptic technique maintained. Pain scale 0 out of 10 on pain scale. Pt instructed to wait in clinic for 15 minutes after injection was given.

## 2023-06-22 ENCOUNTER — PATIENT MESSAGE (OUTPATIENT)
Dept: UROLOGY | Facility: CLINIC | Age: 63
End: 2023-06-22
Payer: COMMERCIAL

## 2023-06-22 DIAGNOSIS — R97.20 ELEVATED PSA: Primary | ICD-10-CM

## 2023-06-22 LAB
PROSTATE SPECIFIC ANTIGEN, TOTAL: 5.6 NG/ML (ref 0–4)
PSA FREE MFR SERPL: 36.43 %
PSA FREE SERPL-MCNC: 2.04 NG/ML (ref 0–1.5)

## 2023-06-23 NOTE — TELEPHONE ENCOUNTER
See xiomarat encounter to pt re his psa elevation  Needs mri prostate set prior to 7/13 and then will need annual visit after (prev cx 5/12). Inquired about best availability for clinic to work in, and will likely need to inquire about mri timing. Assume early morning vs late afternoon but check with doc

## 2023-06-24 ENCOUNTER — PATIENT MESSAGE (OUTPATIENT)
Dept: UROLOGY | Facility: CLINIC | Age: 63
End: 2023-06-24
Payer: COMMERCIAL

## 2023-06-28 ENCOUNTER — PATIENT MESSAGE (OUTPATIENT)
Dept: ADMINISTRATIVE | Facility: OTHER | Age: 63
End: 2023-06-28
Payer: COMMERCIAL

## 2023-06-28 ENCOUNTER — HOSPITAL ENCOUNTER (OUTPATIENT)
Dept: RADIOLOGY | Facility: HOSPITAL | Age: 63
Discharge: HOME | End: 2023-06-28
Attending: FAMILY MEDICINE
Payer: COMMERCIAL

## 2023-06-28 DIAGNOSIS — Z13.6 SCREENING FOR AAA (ABDOMINAL AORTIC ANEURYSM): ICD-10-CM

## 2023-06-28 PROCEDURE — 76706 US ABDL AORTA SCREEN AAA: CPT | Mod: 26,,, | Performed by: RADIOLOGY

## 2023-06-28 PROCEDURE — 76706 US AAA SCREENING: ICD-10-PCS | Mod: 26,,, | Performed by: RADIOLOGY

## 2023-06-28 PROCEDURE — 76706 US ABDL AORTA SCREEN AAA: CPT | Mod: TC

## 2023-07-12 ENCOUNTER — CLINICAL SUPPORT (OUTPATIENT)
Dept: REHABILITATION | Facility: HOSPITAL | Age: 63
End: 2023-07-12
Attending: FAMILY MEDICINE
Payer: COMMERCIAL

## 2023-07-12 ENCOUNTER — HOSPITAL ENCOUNTER (OUTPATIENT)
Dept: RADIOLOGY | Facility: HOSPITAL | Age: 63
Discharge: HOME | End: 2023-07-12
Attending: UROLOGY
Payer: COMMERCIAL

## 2023-07-12 DIAGNOSIS — R97.20 ELEVATED PSA: ICD-10-CM

## 2023-07-12 DIAGNOSIS — G89.29 CHRONIC RIGHT-SIDED LOW BACK PAIN WITHOUT SCIATICA: ICD-10-CM

## 2023-07-12 DIAGNOSIS — M54.50 CHRONIC RIGHT-SIDED LOW BACK PAIN WITHOUT SCIATICA: ICD-10-CM

## 2023-07-12 PROCEDURE — 72197 MRI PELVIS W/O & W/DYE: CPT | Mod: 26,,, | Performed by: RADIOLOGY

## 2023-07-12 PROCEDURE — 97110 THERAPEUTIC EXERCISES: CPT | Mod: PN

## 2023-07-12 PROCEDURE — 72197 MRI PELVIS W/O & W/DYE: CPT | Mod: TC

## 2023-07-12 PROCEDURE — A9585 GADOBUTROL INJECTION: HCPCS

## 2023-07-12 PROCEDURE — 25500020 PHARM REV CODE 255

## 2023-07-12 PROCEDURE — 97161 PT EVAL LOW COMPLEX 20 MIN: CPT | Mod: PN

## 2023-07-12 PROCEDURE — 72197 MRI PROSTATE W W/O CONTRAST: ICD-10-PCS | Mod: 26,,, | Performed by: RADIOLOGY

## 2023-07-12 RX ORDER — GADOBUTROL 604.72 MG/ML
INJECTION INTRAVENOUS
Status: COMPLETED
Start: 2023-07-12 | End: 2023-07-12

## 2023-07-12 RX ADMIN — GADOBUTROL 8 ML: 604.72 INJECTION INTRAVENOUS at 03:07

## 2023-07-12 NOTE — PLAN OF CARE
[facility]   Physical Therapy Initial Evaluation      Name: Parth Juarez  Clinic Number: [id number]    Therapy Diagnosis:   Encounter Diagnosis   Name Primary?    Chronic right-sided low back pain without sciatica         Physician: Betty Styles MD    Physician Orders: PT Eval and Treat   Medical Diagnosis from Referral: Chronic right sided LBP without sciatica.  Evaluation Date: 7/12/2023  Authorization Period Expiration: 6/20/24  Plan of Care Expiration: 9/15/23  Progress Note Due: 8/12/23  Visit # / Visits authorized: 1/ 1   FOTO: 83/100    Precautions: Standard Ca,no US.    Time In: 0700  Time Out: 0745  Total Billable Time: 45 minutes    Subjective     Date of onset: Insidious.    History of current condition - Parth reports: LBP started ~ 6 months ago without trauma,gradually getting worse,on and off.Pt reports difficulties with ADLs,functional activities,homemaking.  He rports pain radiating sometimes down to RLE.  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  Prior Level of Function: Independent.  Current Level of Function: Modified independent.    Pain:  Current 0/10, worst 7/10, best 0/10   Location: right back    Description: Aching, Dull, Burning, Throbbing, Numb, and Variable  Aggravating Factors: Standing, Walking, and Lifting  Easing Factors: relaxation and rest    Patients goals: reduce pain,return to previous LOF.     Medical History:   Past Medical History:   Diagnosis Date    ADD (attention deficit disorder)     Asthma     exercise induced    BPH (benign prostatic hyperplasia)     Elevated PSA     Hyperlipidemia     Hypertension        Surgical History:   Parth Juarez  has a past surgical history that includes Pterygium excision w/ graft; removal of colon polyps; Colonoscopy ([id number]); Breast biopsy; Colonoscopy (N/A, 4/11/2018); Transrectal biopsy of prostate with  ultrasound guidance (N/A, 3/22/2022); and Cystoscopy (N/A, 3/22/2022).    Medications:   Parth has a current medication list which includes the following prescription(s): ascorbic acid (vitamin c), atorvastatin, bupropion, citalopram, fluticasone propionate, folic acid/multivit-min/lutein, inhalation spacing device, losartan, omega-3s/dha/epa/fish oil, psyllium, and tamsulosin.    Allergies:   Review of patient's allergies indicates:   Allergen Reactions    No known drug allergies         Objective        Cervical/Thoracic/Lumbar AROM: Pain/Dysfunction with Movement:   Flexion  WNL    Extension   15    Right side bending  10    Left side bending  15    Right rotation  15    Left rotation  15      Strength of l/spine is grossly 3-/5 in all planes  Posture;mild scoliosis.  Special tests;  Axial loading;neg  Slump test;neg  SLR LT;70, RT;70, neg,both HS tight.  Heriberto's;neg.  FADIR;neg  Palpation;L2-L5 paraspinals tender bilaterally,RT>LT.    Intake Outcome Measure for FOTO lumbar Survey    Therapist reviewed FOTO scores for Parth Juarez on 7/12/2023.   FOTO documents entered into [employer] - see Media section.    Intake Score: 17%         Treatment     Total Treatment time (time-based codes) separate from Evaluation: 9 minutes     Parth received the treatments listed below:      therapeutic exercises to develop strength for 9 minutes including:  Bike x 9'.    Patient Education and Home Exercises     Education provided:   - Role of PT.POC.    Assessment     Parth is a 63 y.o. male referred to outpatient Physical Therapy with a medical diagnosis of chronic LBP. Patient presents with ROM and strength deficits,poor posture,impaired function due to asia and above listed deficits.    Patient prognosis is Good.   Patient will benefit from skilled outpatient Physical Therapy to address the deficits stated above and in the chart below, provide patient /family education, and to maximize patientt's level of independence.     Plan  of care discussed with patient: Yes  Patient's spiritual, cultural and educational needs considered and patient is agreeable to the plan of care and goals as stated below:     Anticipated Barriers for therapy: no    Medical Necessity is demonstrated by the following  History  Co-morbidities and personal factors that may impact the plan of care [x] LOW: no personal factors / co-morbidities  [] MODERATE: 1-2 personal factors / co-morbidities  [] HIGH: 3+ personal factors / co-morbidities    Moderate / High Support Documentation:   Co-morbidities affecting plan of care:     Personal Factors:   no deficits     Examination  Body Structures and Functions, activity limitations and participation restrictions that may impact the plan of care [x] LOW: addressing 1-2 elements  [] MODERATE: 3+ elements  [] HIGH: 4+ elements (please support below)    Moderate / High Support Documentation:      Clinical Presentation [x] LOW: stable  [] MODERATE: Evolving  [] HIGH: Unstable     Decision Making/ Complexity Score: low       Goals  SHORT TERM GOALS:  3 weeks  Progress Date met   Recent signs and systems trend is improving in order to progress towards Long term goals.  [] Met  [] Not Met  [] Progressing    Patient will be independent with Home Exercise Program  in order to further progress and return to maximal function. [] Met  [] Not Met  [] Progressing    Pain rating at Worst: 5 /10 in order to progress towards increased independence with activity. [] Met  [] Not Met  [] Progressing    Patient will be able to correct postural deviations in sitting and standing, to decrease pain and promote postural awareness for injury prevention.  [] Met  [] Not Met  [] Progressing    Patient will improve functional outcome (FOTO) score: by 5% to increase self-worth & perceived functional ability towards long term goals [] Met  [] Not Met  [] Progressing      LONG TERM GOALS: 6 weeks  Progress Date met   Patient will return to normal activites of  daily living, recreational, and work related activities with less pain and limitation.  [] Met  [] Not Met  [] Progressing    Patient will improve range of motion  to stated goals in order to return to maximal functional potential. ROM WNL/WFL in all planes. [] Met  [] Not Met  [] Progressing    Patient will improve Strength to stated goals of appropriate musculature in order to improve functional independence. Strength of l/spine 5/5 in all planes. [] Met  [] Not Met  [] Progressing    Pain Rating at Best: 1/10 to improve Quality of Life. Pt will normalize posture. [] Met  [] Not Met  [] Progressing    Patient will meet predicted functional outcome (FOTO) score: 15% to increase self-worth & perceived functional ability. [] Met  [] Not Met  [] Progressing    Patient will have met/partially met personal goal of: reduce pain and return to previous LOF. [] Met  [] Not Met  [] Progressing         Plan     Plan of care Certification: 7/12/2023 to 9/15/23.    Outpatient Physical Therapy 2 times weekly for 6 weeks to include the following interventions: Electrical Stimulation PRN, Manual Therapy, Moist Heat/ Ice, Neuromuscular Re-ed, Patient Education, Therapeutic Activities, Therapeutic Exercise, and dry needling PRN.IMS PRN. .     Braxton Alvarez, PT

## 2023-07-16 NOTE — PROGRESS NOTES
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 No famHx CaP but father had bladder ca  When psa crispin on 3/20/16 to 4.3 he was placed on finasteride 5mg daily and repeat psa 10/11/16 was 2.8 (=5.6)  When he was seen again in 2018 by Dr Gibbs it was noted : Dr Styles put the patient on finasteride 5 mg daily with some improvement in LUTS and last PSA 3/21/2018 and was 4.4 and this was before he started taking finasteride. Repeat psa 9/19/18 was 3.1 (=6.2)  Last saw Dr Gibbs 2/2020 noting progressive LUTS with urgency, frequency, PV dribble though also on diuretic and NTF x0     On my evaluation last year, noted after diuretic more frequency/urgency. Better somewhat after decreasing dose from 25 to 12.5  Felt like wasn't emptying as well when starting wellbutrin. Has been on finasteride monotherapy only in past, but stopped it completely given contraindication to blood donation PSA in 2009 at biopsy was 3.7, age adjusted elevation. Early in AM does have some UUI. Even tried shield pad. Occ AMs if cant make it on time   Heavy coffee in AM, not much during day, no soft drink. Metamucil daily to prevent constipation bc was a problem  SANDY 35-40g benign. Started flomax with conservative recs for urgency frequency     Cysto/Prostate Biopsy 3/22/22 (of note took his flomax at 4am and BP was 90s/50s on triage). PSA: 6.3, VOLUME: 83.9cc  U/S:  scattered punctate hypoechoities, no median lobe, normal SVs; CYSTO: significant kissing  "Lateral lobe obstruction centrally and distally, more anterolateral proximally though asymm R > L,  with minimal intravesical extension without median lobe. Mild scattered trabeculations   PATH: 14 cores BPH  On bx follow up noted that now on flomax, having stopped his diuretic, his BP is controlled and his urgency frequency have largely resolved. Decd caffeine some.   AUA SS 1/0, delighted (1 for frequency.)     11/2/22: AUASS: 4/0 (Urgency- 2; Weak Stream- 1; Sleeping- 1). PVR 0cc; Uroflow: voided 128cc in 16s with Qm 16.7cc/s, Qa 8.4cc/s, 2 intervals  Uroflow reviewed with nonobstructed pattern and efficient voiding curve    He returns today noting   Reference Range  01/26/21 08:08 02/04/22 12:22 10/04/22 07:13 06/21/23 16:23   PSA, Screen 0.00 - 4.00  3.5 6.3 (H)     PSA Total 0.00 - 4.00    3.1 5.6 (H)   PSA, Free 0.00 - 1.50    1.11 2.04 (H)   PSA, Free % Not est %   35.81 36.43   Had recent follow up with Dr Styles 6/21/23 noting he is now on CPAP (Bubba) for his ELLIOT, BP had been low so HCTZ stopped and BP remained at goal, and noted monitoring for mild LUTS due to BPH .  Has occ urgency and mild urge incontinence. Feels he empties well. Urinates once at night. Nl stream. Nl hesitancy.   Noted last psa elevation and biopsy with bph/inflammation and PSA free/total repeated and forwarded as above  On review of PSA, got follow up MRI prostate prior to reeval  MRI prostate 7/12/23: 87g pirad2  6/14/23 HbA1c 5.3, Cr 1.1, eGFR >60  He reports AUA SS: 5/0, delighted.  Urgency has improved, increases with caffeine, which he has decreased.  Nocturia down to 0, and did at CPAP recently as above.  He has been exercising, and feels well.  PVR 14 cc by bladder scan.    Focused Physical Exam:    Vitals:    07/17/23 1540   BP: 131/83   Pulse: 62     Body mass index is 29.35 kg/m². Weight: 85 kg (187 lb 6.3 oz) Height: 5' 7" (170.2 cm)     Abdomen: Soft, non-tender, nondistended, no CVA tenderness    Recent Results (from " the past 336 hour(s))   POCT Bladder Scan    Collection Time: 07/17/23  4:00 PM   Result Value Ref Range    POC Residual Urine Volume 14 0 - 100 mL   POCT Urinalysis(Instrument)    Collection Time: 07/17/23  4:00 PM   Result Value Ref Range    Color, POC UA Yellow Yellow, Straw, Colorless    Clarity, POC UA Clear Clear    Glucose, POC UA Negative Negative    Bilirubin, POC UA Negative Negative    Ketones, POC UA Negative Negative    Spec Grav POC UA <=1.005 1.005 - 1.030    Blood, POC UA Negative Negative    pH, POC UA 5.5 5.0 - 8.0    Protein, POC UA Negative Negative    Urobilinogen, POC UA 0.2 <=1.0    Nitrite, POC UA Negative Negative    WBC, POC UA Negative Negative       ASSESSMENT   1. BPH with elevated PSA  POCT Bladder Scan    POCT Urinalysis(Instrument)    PSA, Total and Free          Plan    Extensively reviewed interim follow-up evaluations since last office visit including unobstructed uroflow 6 months ago, and interim evaluation and labs with primary care with persistent PSA elevation however normal free PSA greater than 30%, and with MRI of the prostate in the interim again noting significant prostatomegaly without any concerning index lesions.  Prostate volume greater than 80 g is consistent with his last prostate biopsy which was benign, and again reviewed that his PSA density is very normal.   Again reviewed that he was previously on finasteride monotherapy, with near doubling of prostate volume from 2009 to present, and despite PSA elevation, given negative prostate biopsy, very normal free PSA greater than 30% which is most consistent with BPH, and negative/PI-rad 2 MRI, no concern for underlying malignancy, and attribute all to BPH at this time.  His symptoms are very well controlled on alpha-blocker, Flomax, and he is voiding and emptying well at this time and pleased with his urinary quality of life.  The combination of alpha-blocker and conservative recommendations such as decreasing  caffeine, and the interim change in his blood pressure regimen decreasing diuretic such as hydrochlorothiazide, the significantly decreased his urgency frequency and he is no longer experiencing urge incontinence.  Perhaps the Flomax is contributing to some of his blood pressure lowering however this was positive in that his BP management regimen can be changed in decrease his diuretic which may have been contributing to LUTS, and his blood pressure has remained at goal.    We did again review the significant lateral lobe prostate obstruction seen at time of his prostate biopsy on cystoscopy, and again reviewed the natural history of BPH with obstruction and progressive symptomatology as well as progressive obstructive changes at the level of the bladder.  He is well managed on medical therapy, and discuss that even though his symptoms are well managed on alpha-blocker, the medication acts at the level of the prostate, and even with symptoms well managed, the bladder is still going down the path of obstructive changes from this anatomic obstruction, which can lead to future bladder damage and worsening urgency frequency and OAB symptoms.  He is aware of this, but feels well at this time and would like to remain on alpha-blocker after extensive risk benefit discussion about minimally invasive BPH interventions versus continued medical management.  As well, he had an unobstructed uroflow 6 months ago and is emptying well and has had no complications of obstruction, and his irritative symptoms have resolved.  Will continue to follow his LUTS closely, and may consider BPH intervention in the future.  At this time, his PSA monitoring can return to annual follow-up and screening visit, however rather than a screening PSA, would continue to follow with PSA free and total for these 2 data points as I do expect his PSA to remain an elevation, however free PSA remains normal and his PSA density remains quite normal, no  distinct further evaluation for underlying malignancy would be needed unless indicated, and continue to follow his BPH/LUTS    RTC 1 year with PSA f/t prior     Total time spent in/on encounter today, including face to face time with patient, counseling, medical record review, interpretation of tests/results, , and treatment plan coordination: 40 minutes

## 2023-07-17 ENCOUNTER — OFFICE VISIT (OUTPATIENT)
Dept: UROLOGY | Facility: CLINIC | Age: 63
End: 2023-07-17
Payer: COMMERCIAL

## 2023-07-17 VITALS
HEIGHT: 67 IN | DIASTOLIC BLOOD PRESSURE: 83 MMHG | SYSTOLIC BLOOD PRESSURE: 131 MMHG | WEIGHT: 187.38 LBS | HEART RATE: 62 BPM | BODY MASS INDEX: 29.41 KG/M2

## 2023-07-17 DIAGNOSIS — R97.20 BPH WITH ELEVATED PSA: Primary | ICD-10-CM

## 2023-07-17 DIAGNOSIS — N40.0 BPH WITH ELEVATED PSA: Primary | ICD-10-CM

## 2023-07-17 LAB
BILIRUBIN, UA POC OHS: NEGATIVE
BLOOD, UA POC OHS: NEGATIVE
CLARITY, UA POC OHS: CLEAR
COLOR, UA POC OHS: YELLOW
GLUCOSE, UA POC OHS: NEGATIVE
KETONES, UA POC OHS: NEGATIVE
LEUKOCYTES, UA POC OHS: NEGATIVE
NITRITE, UA POC OHS: NEGATIVE
PH, UA POC OHS: 5.5
POC RESIDUAL URINE VOLUME: 14 ML (ref 0–100)
PROTEIN, UA POC OHS: NEGATIVE
SPECIFIC GRAVITY, UA POC OHS: <=1.005
UROBILINOGEN, UA POC OHS: 0.2

## 2023-07-17 PROCEDURE — 3288F FALL RISK ASSESSMENT DOCD: CPT | Mod: CPTII,S$GLB,, | Performed by: UROLOGY

## 2023-07-17 PROCEDURE — 1101F PT FALLS ASSESS-DOCD LE1/YR: CPT | Mod: CPTII,S$GLB,, | Performed by: UROLOGY

## 2023-07-17 PROCEDURE — 51798 US URINE CAPACITY MEASURE: CPT | Mod: S$GLB,,, | Performed by: UROLOGY

## 2023-07-17 PROCEDURE — 51798 POCT BLADDER SCAN: ICD-10-PCS | Mod: S$GLB,,, | Performed by: UROLOGY

## 2023-07-17 PROCEDURE — 1101F PR PT FALLS ASSESS DOC 0-1 FALLS W/OUT INJ PAST YR: ICD-10-PCS | Mod: CPTII,S$GLB,, | Performed by: UROLOGY

## 2023-07-17 PROCEDURE — 3079F PR MOST RECENT DIASTOLIC BLOOD PRESSURE 80-89 MM HG: ICD-10-PCS | Mod: CPTII,S$GLB,, | Performed by: UROLOGY

## 2023-07-17 PROCEDURE — 99999 PR PBB SHADOW E&M-EST. PATIENT-LVL III: ICD-10-PCS | Mod: PBBFAC,,, | Performed by: UROLOGY

## 2023-07-17 PROCEDURE — 4010F ACE/ARB THERAPY RXD/TAKEN: CPT | Mod: CPTII,S$GLB,, | Performed by: UROLOGY

## 2023-07-17 PROCEDURE — 1160F RVW MEDS BY RX/DR IN RCRD: CPT | Mod: CPTII,S$GLB,, | Performed by: UROLOGY

## 2023-07-17 PROCEDURE — 81003 POCT URINALYSIS(INSTRUMENT): ICD-10-PCS | Mod: QW,S$GLB,, | Performed by: UROLOGY

## 2023-07-17 PROCEDURE — 3008F PR BODY MASS INDEX (BMI) DOCUMENTED: ICD-10-PCS | Mod: CPTII,S$GLB,, | Performed by: UROLOGY

## 2023-07-17 PROCEDURE — 99215 OFFICE O/P EST HI 40 MIN: CPT | Mod: S$GLB,,, | Performed by: UROLOGY

## 2023-07-17 PROCEDURE — 3008F BODY MASS INDEX DOCD: CPT | Mod: CPTII,S$GLB,, | Performed by: UROLOGY

## 2023-07-17 PROCEDURE — 99999 PR PBB SHADOW E&M-EST. PATIENT-LVL III: CPT | Mod: PBBFAC,,, | Performed by: UROLOGY

## 2023-07-17 PROCEDURE — 3079F DIAST BP 80-89 MM HG: CPT | Mod: CPTII,S$GLB,, | Performed by: UROLOGY

## 2023-07-17 PROCEDURE — 3075F PR MOST RECENT SYSTOLIC BLOOD PRESS GE 130-139MM HG: ICD-10-PCS | Mod: CPTII,S$GLB,, | Performed by: UROLOGY

## 2023-07-17 PROCEDURE — 1159F MED LIST DOCD IN RCRD: CPT | Mod: CPTII,S$GLB,, | Performed by: UROLOGY

## 2023-07-17 PROCEDURE — 81003 URINALYSIS AUTO W/O SCOPE: CPT | Mod: QW,S$GLB,, | Performed by: UROLOGY

## 2023-07-17 PROCEDURE — 3044F PR MOST RECENT HEMOGLOBIN A1C LEVEL <7.0%: ICD-10-PCS | Mod: CPTII,S$GLB,, | Performed by: UROLOGY

## 2023-07-17 PROCEDURE — 3044F HG A1C LEVEL LT 7.0%: CPT | Mod: CPTII,S$GLB,, | Performed by: UROLOGY

## 2023-07-17 PROCEDURE — 4010F PR ACE/ARB THEARPY RXD/TAKEN: ICD-10-PCS | Mod: CPTII,S$GLB,, | Performed by: UROLOGY

## 2023-07-17 PROCEDURE — 3075F SYST BP GE 130 - 139MM HG: CPT | Mod: CPTII,S$GLB,, | Performed by: UROLOGY

## 2023-07-17 PROCEDURE — 1160F PR REVIEW ALL MEDS BY PRESCRIBER/CLIN PHARMACIST DOCUMENTED: ICD-10-PCS | Mod: CPTII,S$GLB,, | Performed by: UROLOGY

## 2023-07-17 PROCEDURE — 99215 PR OFFICE/OUTPT VISIT, EST, LEVL V, 40-54 MIN: ICD-10-PCS | Mod: S$GLB,,, | Performed by: UROLOGY

## 2023-07-17 PROCEDURE — 1159F PR MEDICATION LIST DOCUMENTED IN MEDICAL RECORD: ICD-10-PCS | Mod: CPTII,S$GLB,, | Performed by: UROLOGY

## 2023-07-17 PROCEDURE — 3288F PR FALLS RISK ASSESSMENT DOCUMENTED: ICD-10-PCS | Mod: CPTII,S$GLB,, | Performed by: UROLOGY

## 2023-07-17 RX ORDER — TAMSULOSIN HYDROCHLORIDE 0.4 MG/1
0.4 CAPSULE ORAL NIGHTLY
Qty: 30 CAPSULE | Refills: 11 | Status: SHIPPED | OUTPATIENT
Start: 2023-07-17 | End: 2023-12-26 | Stop reason: SDUPTHER

## 2023-07-18 NOTE — PROGRESS NOTES
"  [facility]   Physical Therapy Treatment Note      Name: Parth Juarez  Clinic Number: [id number]    Therapy Diagnosis: No diagnosis found.  Physician: Betty Styles MD    Visit Date: 7/19/2023    Physician Orders: PT Eval and Treat   Medical Diagnosis from Referral: Chronic right sided LBP without sciatica.  Evaluation Date: 7/12/2023  Authorization Period Expiration: 6/20/24  Plan of Care Expiration: 9/15/23  Progress Note Due: 8/12/23  Visit # / Visits authorized: 1/ 1   FOTO: 83/100    PTA Visit #: 1/5     Time in:  Time out:  Billable minutes:        Precautions: Standard Ca,no US.      Subjective     Pt reports: ***.  He {Actions; was/was not:67831} compliant with home exercise program.  Response to previous treatment: ***  Functional change: ***    Pain: {0-10:68595::"0"}/10  Location: {RIGHT/LEFT/BILATERAL:60122} {LOCATION ON BODY:18276}     Objective      Objective Measures updated at progress report unless specified.     Treatment     Parth received the treatments listed below:      therapeutic exercises to develop strength, flexibility, posture, and core stabilization for 30 minutes including:    Bike   Seated hamstring stretch   Seated piriformis stretch   Physioball rollouts       neuromuscular re-education activities to improve: Posture for 23 minutes. The following activities were included:    Physioball Lower trunk rotation   Physioball Double Knee To Chest   Bridges   Supine clams   Hip adduction       therapeutic activities to improve functional performance for 0  minutes, including:    Hip 3 way Bilateral         Patient Education and Home Exercises       Education provided:   - home exercises     Written Home Exercises Provided: Patient instructed to cont prior HEP. Exercises were reviewed and Parth was able to demonstrate them prior to the end of the session.  Parth demonstrated good  understanding of the education provided. See EMR under Patient Instructions " for exercises provided during therapy sessions    Assessment     ***    Parth Is progressing well towards his goals.   Pt prognosis is Good.     Pt will continue to benefit from skilled outpatient physical therapy to address the deficits listed in the problem list box on initial evaluation, provide pt/family education and to maximize pt's level of independence in the home and community environment.     Pt's spiritual, cultural and educational needs considered and pt agreeable to plan of care and goals.     Anticipated barriers to physical therapy: none     Goals:   SHORT TERM GOALS:  3 weeks  Progress Date met   Recent signs and systems trend is improving in order to progress towards Long term goals.  [] Met  [] Not Met  [x] Progressing     Patient will be independent with Home Exercise Program  in order to further progress and return to maximal function. [] Met  [] Not Met  [x] Progressing     Pain rating at Worst: 5 /10 in order to progress towards increased independence with activity. [] Met  [] Not Met  [x] Progressing     Patient will be able to correct postural deviations in sitting and standing, to decrease pain and promote postural awareness for injury prevention.  [] Met  [] Not Met  [x] Progressing     Patient will improve functional outcome (FOTO) score: by 5% to increase self-worth & perceived functional ability towards long term goals [] Met  [] Not Met  [x] Progressing        LONG TERM GOALS: 6 weeks  Progress Date met   Patient will return to normal activites of daily living, recreational, and work related activities with less pain and limitation.  [] Met  [] Not Met  [x] Progressing     Patient will improve range of motion  to stated goals in order to return to maximal functional potential. ROM WNL/WFL in all planes. [] Met  [] Not Met  [x] Progressing     Patient will improve Strength to stated goals of appropriate musculature in order to improve functional independence. Strength of l/spine 5/5 in  all planes. [] Met  [] Not Met  [x] Progressing     Pain Rating at Best: 1/10 to improve Quality of Life. Pt will normalize posture. [] Met  [] Not Met  [x] Progressing     Patient will meet predicted functional outcome (FOTO) score: 15% to increase self-worth & perceived functional ability. [] Met  [] Not Met  [x] Progressing     Patient will have met/partially met personal goal of: reduce pain and return to previous LOF. [] Met  [] Not Met  [x] Progressing          Plan     Continue per Plan of Care     Jacklyn Waldron, PTA

## 2023-07-19 ENCOUNTER — CLINICAL SUPPORT (OUTPATIENT)
Dept: REHABILITATION | Facility: HOSPITAL | Age: 63
End: 2023-07-19
Payer: COMMERCIAL

## 2023-07-19 DIAGNOSIS — M54.50 CHRONIC LUMBOSACRAL PAIN: Primary | ICD-10-CM

## 2023-07-19 DIAGNOSIS — G89.29 CHRONIC LUMBOSACRAL PAIN: Primary | ICD-10-CM

## 2023-07-19 PROCEDURE — 97110 THERAPEUTIC EXERCISES: CPT | Mod: PN

## 2023-07-19 PROCEDURE — 97112 NEUROMUSCULAR REEDUCATION: CPT | Mod: PN

## 2023-07-19 NOTE — PROGRESS NOTES
[facility]   Physical Therapy Treatment Note      Name: Parth Juarez  Clinic Number: [id number]    Therapy Diagnosis: Chronic LBP  Physician: Betty Styles MD    Visit Date: 7/19/2023    Physician Orders: PT Eval and Treat   Medical Diagnosis from Referral: Chronic right sided LBP without sciatica.  Evaluation Date: 7/12/2023  Authorization Period Expiration: 6/20/24  Plan of Care Expiration: 9/15/23  Progress Note Due: 8/12/23  Visit # / Visits authorized: 1/ 12   FOTO: 83/100     Precautions: Standard Ca,no US.     Time In: 1200  Time Out: 1255  Insurance: Payor: [employer]OSS [employer] / Plan: [employer] [place] / Product Type: Self Funded /     Subjective     Pt reports: he has pain on right side that he feels mostly with washing dishes. It is painful to stand on both legs at that time. He can stand on one and then the other without pain.    He was not compliant with home exercise program - not given HEP  Response to previous treatment: positive  Functional change: none reported    Pain: 0/10  Location: Right side back    Objective      7/19/2023 tested positive for Right Leg length Discrepancy - Right leg longer  Recommend testing again in 4 visits - may need heel lift in left shoe    Treatment     Parth received the treatments listed below:      therapeutic exercises to develop Range of Motion, strength, flexibility for 27 minutes including:  NMRE utilized to activate appropriate mm to improve posture, core and lumbar stabilization and lumbopelvic stability: 28 minutes     Bike x 9' - NOT PERFORMED due to pt performing cardio in gym same morning    TABLE:  L/s rotations, 10x Bilateral   Open Books, 10x Bilateral   Ab bracing, 20x5s  Supine Green Theraband hip abduction, 30x  Green Theraband Bridge, 20-30x  Sciatic nerve glide supine,  2x10  Hip ball adduction squeeze, 20-30x  Piriformis stretch, pull across, [id number] Bilateral   Prone forearm stretch,  [id number]  Prone quad stretch with strap, [id number] Bilateral     STAND:  RIGHT only Quadratus Lumborum stretch, 5x10 sec  Red Theraband Bilateral External Rotation, 20x   Red Theraband Shoulder extension, 20-30x    SEATED:  Thoracic extension stretch, hands behind head, 10x  Piriformis stretch, [id number] Bilateral (for work)        Add NEXT/FUTURE visit:  Cable Column: Retro walk / Paloff Press  Hip hinge with Squats, cue bottom back  Dish washing mechanics  Upright posture exercises    Patient Education and Home Exercises       Education provided:   - daily HEP given today    Written Home Exercises Provided: yes. Exercises were reviewed and Parth was able to demonstrate them prior to the end of the session.  Parth demonstrated good  understanding of the education provided. See EMR under Patient Instructions for exercises provided during therapy sessions    Assessment     Pt demonstrates no pain upon arrival; reports of pain when Weight Bearing on both legs. Tested pt innominates and he tested positive for LLD; Right leg is longer. Recommend retesting in a few more visits to determine if pt needs heel lift in left shoe. Pt able to perform all therex without pn provocation. Continue posture education, core strength, spine mobility and hip flexibility exercises to optimize quality of life.     Parth Is progressing well towards his goals.   Pt prognosis is Excellent.     Pt will continue to benefit from skilled outpatient physical therapy to address the deficits listed in the problem list box on initial evaluation, provide pt/family education and to maximize pt's level of independence in the home and community environment.     Pt's spiritual, cultural and educational needs considered and pt agreeable to plan of care and goals.     Anticipated barriers to physical therapy: none    Goals:   Goals  SHORT TERM GOALS:  3 weeks  Progress Date met   Recent signs and systems trend is improving in order to progress towards  Long term goals.  [] Met  [] Not Met  [] Progressing     Patient will be independent with Home Exercise Program  in order to further progress and return to maximal function. [] Met  [] Not Met  [] Progressing     Pain rating at Worst: 5 /10 in order to progress towards increased independence with activity. [] Met  [] Not Met  [] Progressing     Patient will be able to correct postural deviations in sitting and standing, to decrease pain and promote postural awareness for injury prevention.  [] Met  [] Not Met  [] Progressing     Patient will improve functional outcome (FOTO) score: by 5% to increase self-worth & perceived functional ability towards long term goals [] Met  [] Not Met  [] Progressing        LONG TERM GOALS: 6 weeks  Progress Date met   Patient will return to normal activites of daily living, recreational, and work related activities with less pain and limitation.  [] Met  [] Not Met  [] Progressing     Patient will improve range of motion  to stated goals in order to return to maximal functional potential. ROM WNL/WFL in all planes. [] Met  [] Not Met  [] Progressing     Patient will improve Strength to stated goals of appropriate musculature in order to improve functional independence. Strength of l/spine 5/5 in all planes. [] Met  [] Not Met  [] Progressing     Pain Rating at Best: 1/10 to improve Quality of Life. Pt will normalize posture. [] Met  [] Not Met  [] Progressing     Patient will meet predicted functional outcome (FOTO) score: 15% to increase self-worth & perceived functional ability. [] Met  [] Not Met  [] Progressing     Patient will have met/partially met personal goal of: reduce pain and return to previous LOF. [] Met  [] Not Met  [] Progressing          Plan     Continue PT as deemed per POC: core strengthening, hip flexibility, possible heel lift in left shoe (TBD)    Coni Alvarez, PT

## 2023-07-21 ENCOUNTER — CLINICAL SUPPORT (OUTPATIENT)
Dept: REHABILITATION | Facility: HOSPITAL | Age: 63
End: 2023-07-21
Payer: COMMERCIAL

## 2023-07-21 DIAGNOSIS — G89.29 CHRONIC LOW BACK PAIN WITHOUT SCIATICA, UNSPECIFIED BACK PAIN LATERALITY: Primary | ICD-10-CM

## 2023-07-21 DIAGNOSIS — M54.50 CHRONIC LOW BACK PAIN WITHOUT SCIATICA, UNSPECIFIED BACK PAIN LATERALITY: Primary | ICD-10-CM

## 2023-07-21 PROCEDURE — 97110 THERAPEUTIC EXERCISES: CPT | Mod: PN

## 2023-07-21 PROCEDURE — 97140 MANUAL THERAPY 1/> REGIONS: CPT | Mod: PN

## 2023-07-21 PROCEDURE — 97014 ELECTRIC STIMULATION THERAPY: CPT | Mod: PN

## 2023-07-21 NOTE — PROGRESS NOTES
[facility]   Physical Therapy Treatment Note      Name: Parth Juarez  Clinic Number: [id number]    Therapy Diagnosis:   Encounter Diagnosis   Name Primary?    Chronic low back pain without sciatica, unspecified back pain laterality Yes     Physician: Betty Styles MD    Visit Date;7/21/23     Physician Orders: PT Eval and Treat   Medical Diagnosis from Referral: Chronic right sided LBP without sciatica.  Evaluation Date: 7/12/2023  Authorization Period Expiration: 6/20/24  Plan of Care Expiration: 9/15/23  Progress Note Due: 8/12/23  Visit # / Visits authorized: 1/ 1   FOTO: 83/100     Precautions: Standard Ca,no US.     Time In: 0655  Time Out: 0750  Total Billable Time: 53 minutes     PTA Visit #: 0/5       Subjective     Pt reports: Pt wants to try dry needling.  He was compliant with home exercise program.  Response to previous treatment: sore  Functional change: not reported    Pain: 6/10  Location: bilateral back      Objective      Objective Measures updated at progress report unless specified.     Treatment     Parth received the treatments listed below:      therapeutic exercises to develop strength for 8 minutes including:  Bike x 8'.    manual therapy techniques: dry needling were applied to the: lumbar multifidus for 30 minutes, including:  HAs 10,14,16,22,24.    neuromuscular re-education activities to improve: Balance, Coordination, Kinesthetic, Sense, Proprioception, and Posture for 0 minutes. The following activities were included:      therapeutic activities to improve functional performance for 0  minutes, including:      supervised modalities after being cleared for contradictions: TENS:  Parth received TENS/IMS electrical stimulation for pain to the l/multifidus. Pt received continuous mode at a rate of 2 pps for 15 minutes. Parth tolerated treatment well without any adverse effects.     Patient Education and Home Exercises       Education provided:   - use heat  if sore.    Assessment     Tolerated dry needling very well.    Parth Is progressing well towards his goals.   Pt prognosis is Good.     Pt will continue to benefit from skilled outpatient physical therapy to address the deficits listed in the problem list box on initial evaluation, provide pt/family education and to maximize pt's level of independence in the home and community environment.     Pt's spiritual, cultural and educational needs considered and pt agreeable to plan of care and goals.     Anticipated barriers to physical therapy: no    Goals:   SHORT TERM GOALS:  3 weeks  Progress Date met   Recent signs and systems trend is improving in order to progress towards Long term goals.  [] Met  [] Not Met  [x] Progressing     Patient will be independent with Home Exercise Program  in order to further progress and return to maximal function. [] Met  [] Not Met  [x] Progressing     Pain rating at Worst: 5 /10 in order to progress towards increased independence with activity. [] Met  [] Not Met  [x] Progressing     Patient will be able to correct postural deviations in sitting and standing, to decrease pain and promote postural awareness for injury prevention.  [] Met  [] Not Met  [x] Progressing     Patient will improve functional outcome (FOTO) score: by 5% to increase self-worth & perceived functional ability towards long term goals [] Met  [] Not Met  [x] Progressing        LONG TERM GOALS: 6 weeks  Progress Date met   Patient will return to normal activites of daily living, recreational, and work related activities with less pain and limitation.  [] Met  [] Not Met  [x] Progressing     Patient will improve range of motion  to stated goals in order to return to maximal functional potential. ROM WNL/WFL in all planes. [] Met  [] Not Met  [x] Progressing     Patient will improve Strength to stated goals of appropriate musculature in order to improve functional independence. Strength of l/spine 5/5 in all  planes. [] Met  [] Not Met  [x] Progressing     Pain Rating at Best: 1/10 to improve Quality of Life. Pt will normalize posture. [] Met  [] Not Met  [x] Progressing     Patient will meet predicted functional outcome (FOTO) score: 15% to increase self-worth & perceived functional ability. [] Met  [] Not Met  [x] Progressing     Patient will have met/partially met personal goal of: reduce pain and return to previous LOF. [] Met  [] Not Met  [x] Progressing      Plan     Progress as able.    Braxton Alvarez, PT

## 2023-07-28 ENCOUNTER — CLINICAL SUPPORT (OUTPATIENT)
Dept: REHABILITATION | Facility: HOSPITAL | Age: 63
End: 2023-07-28
Payer: COMMERCIAL

## 2023-07-28 DIAGNOSIS — G89.29 CHRONIC LOW BACK PAIN WITHOUT SCIATICA, UNSPECIFIED BACK PAIN LATERALITY: Primary | ICD-10-CM

## 2023-07-28 DIAGNOSIS — M54.50 CHRONIC LOW BACK PAIN WITHOUT SCIATICA, UNSPECIFIED BACK PAIN LATERALITY: Primary | ICD-10-CM

## 2023-07-28 PROCEDURE — 97112 NEUROMUSCULAR REEDUCATION: CPT | Mod: PN,CQ

## 2023-07-28 PROCEDURE — 97530 THERAPEUTIC ACTIVITIES: CPT | Mod: PN,CQ

## 2023-07-28 NOTE — PROGRESS NOTES
[facility]   Physical Therapy Treatment Note      Name: Parth Juarez  Clinic Number: [id number]    Therapy Diagnosis: Chronic LBP  Physician: Betty Styles MD    Visit Date: 7/28/2023    Physician Orders: PT Eval and Treat   Medical Diagnosis from Referral: Chronic right sided LBP without sciatica.  Evaluation Date: 7/12/2023  Authorization Period Expiration: 6/20/24  Plan of Care Expiration: 9/15/23  Progress Note Due: 8/12/23  Visit # / Visits authorized: 3/ 12 ([id number])  FOTO: 83/100     Precautions: Standard Ca,no US.     Time In: 0705  Time Out: 0758  Total Billable Time: 25 minutes  Insurance: Payor: BLUE CR[employer] / Plan: [employer] [place] / Product Type: Self Funded /     Subjective     Pt reports: pain has decreased in R hip/buttock area    He was compliant with home exercise program   Response to previous treatment: positive  Functional change: none reported    Pain: 0/10  Location: Right side back    Objective      7/19/2023 tested positive for Right Leg length Discrepancy - Right leg longer  Recommend testing again in 4 visits - may need heel lift in left shoe    Treatment     Parth received the treatments listed below:      therapeutic exercises to develop Range of Motion, strength, flexibility for 20 minutes including:  NMRE utilized to activate appropriate mm to improve posture, core and lumbar stabilization and lumbopelvic stability: 18 minutes   THERAPEUTIC ACTIVITY x 15 minutes to improve functional activities:    Bike x 9' - NOT PERFORMED due to pt performing cardio in gym same morning    TABLE:  L/s rotations, 10x Bilateral   THERAPEUTIC ACTIVITY   Open Books, 10x Bilateral  THERAPEUTIC ACTIVITY   Ab bracing, 20x5s   NOT PERFORMED   Supine Green Theraband hip abduction, 20x NMRE  Green Theraband Bridge, 20x   NMRE   Sciatic nerve glide supine,  3x10 Bilateral   Hip ball adduction squeeze, 20x  NMRE  Piriformis stretch, pull across,  [id number] Bilateral    Prone forearm stretch, [id number]  Prone quad stretch with strap, 5l95ill Bilateral  NOT PERFORMED     STAND:  RIGHT only Quadratus Lumborum stretch, 5x10 sec NOT PERFORMED   Red Theraband Bilateral External Rotation, 20x   Red Theraband Shoulder extension, 20-30x  NOT PERFORMED   Hip 3 way 2 x 10 Bilateral, green disc    THERAPEUTIC ACTIVITY     SEATED:  Thoracic extension stretch, hands behind head, 10x  standing   Piriformis stretch, [id number] Bilateral (for work)   REVIEWED   THERAPEUTIC ACTIVITY        Add NEXT/FUTURE visit:  Cable Column: Retro walk / Paloff Press  Hip hinge with Squats, cue bottom back  Dish washing mechanics  Upright posture exercises    Patient Education and Home Exercises       Education provided:   - daily HEP given today    Written Home Exercises Provided: yes. Exercises were reviewed and Parth was able to demonstrate them prior to the end of the session.  Parth demonstrated good  understanding of the education provided. See EMR under Patient Instructions for exercises provided during therapy sessions    Assessment     Parth reports pain with washing dishes.  Reviewed to place foot on edge of open cabinet or small stool to take pressure off of his back.  Reviewed HOME EXERCISE PROGRAM and performing exercises while sitting at desk.  Decreased lumbar lordosis, pt with difficulty with prone press ups.  Decreased Bilateral hamstring flexibility.  Pt reported decreased pain at conclusion of therpay.     Parth Is progressing well towards his goals.   Pt prognosis is Excellent.     Pt will continue to benefit from skilled outpatient physical therapy to address the deficits listed in the problem list box on initial evaluation, provide pt/family education and to maximize pt's level of independence in the home and community environment.     Pt's spiritual, cultural and educational needs considered and pt agreeable to plan of care and goals.     Anticipated barriers to  physical therapy: none    Goals:   Goals  SHORT TERM GOALS:  3 weeks  Progress Date met   Recent signs and systems trend is improving in order to progress towards Long term goals.  [] Met  [] Not Met  [] Progressing     Patient will be independent with Home Exercise Program  in order to further progress and return to maximal function. [] Met  [] Not Met  [] Progressing     Pain rating at Worst: 5 /10 in order to progress towards increased independence with activity. [] Met  [] Not Met  [] Progressing     Patient will be able to correct postural deviations in sitting and standing, to decrease pain and promote postural awareness for injury prevention.  [] Met  [] Not Met  [] Progressing     Patient will improve functional outcome (FOTO) score: by 5% to increase self-worth & perceived functional ability towards long term goals [] Met  [] Not Met  [] Progressing        LONG TERM GOALS: 6 weeks  Progress Date met   Patient will return to normal activites of daily living, recreational, and work related activities with less pain and limitation.  [] Met  [] Not Met  [] Progressing     Patient will improve range of motion  to stated goals in order to return to maximal functional potential. ROM WNL/WFL in all planes. [] Met  [] Not Met  [] Progressing     Patient will improve Strength to stated goals of appropriate musculature in order to improve functional independence. Strength of l/spine 5/5 in all planes. [] Met  [] Not Met  [] Progressing     Pain Rating at Best: 1/10 to improve Quality of Life. Pt will normalize posture. [] Met  [] Not Met  [] Progressing     Patient will meet predicted functional outcome (FOTO) score: 15% to increase self-worth & perceived functional ability. [] Met  [] Not Met  [] Progressing     Patient will have met/partially met personal goal of: reduce pain and return to previous LOF. [] Met  [] Not Met  [] Progressing          Plan     Continue PT as deemed per POC: core strengthening, hip  flexibility, possible heel lift in left shoe (TBD)    Idania Owens, PTA

## 2023-07-29 DIAGNOSIS — I10 HYPERTENSION, UNSPECIFIED TYPE: ICD-10-CM

## 2023-07-29 NOTE — TELEPHONE ENCOUNTER
No care due was identified.  [facility] Embedded Care Due Messages. Reference number: [id number].   7/29/2023 2:35:51 PM CDT

## 2023-07-30 RX ORDER — BUPROPION HYDROCHLORIDE 150 MG/1
150 TABLET ORAL EVERY MORNING
Qty: 90 TABLET | Refills: 3 | Status: SHIPPED | OUTPATIENT
Start: 2023-07-30

## 2023-07-30 NOTE — TELEPHONE ENCOUNTER
Refill Decision Note   Parth Juarez  is requesting a refill authorization.  Brief Assessment and Rationale for Refill:        Medication Therapy Plan:       Medication Reconciliation Completed: No   Comments:     No Care Gaps recommended.     Note composed:11:25 AM 07/30/2023

## 2023-08-02 ENCOUNTER — CLINICAL SUPPORT (OUTPATIENT)
Dept: REHABILITATION | Facility: HOSPITAL | Age: 63
End: 2023-08-02
Payer: COMMERCIAL

## 2023-08-02 DIAGNOSIS — G89.29 CHRONIC LOW BACK PAIN WITHOUT SCIATICA, UNSPECIFIED BACK PAIN LATERALITY: Primary | ICD-10-CM

## 2023-08-02 DIAGNOSIS — M54.50 CHRONIC LOW BACK PAIN WITHOUT SCIATICA, UNSPECIFIED BACK PAIN LATERALITY: Primary | ICD-10-CM

## 2023-08-02 PROCEDURE — 97014 ELECTRIC STIMULATION THERAPY: CPT | Mod: PN

## 2023-08-02 PROCEDURE — 97140 MANUAL THERAPY 1/> REGIONS: CPT | Mod: PN

## 2023-08-02 PROCEDURE — 97110 THERAPEUTIC EXERCISES: CPT | Mod: PN

## 2023-08-02 NOTE — PROGRESS NOTES
[facility]   Physical Therapy Treatment Note      Name: Parth Juarez  Clinic Number: [id number]    Therapy Diagnosis:   Encounter Diagnosis   Name Primary?    Chronic low back pain without sciatica, unspecified back pain laterality Yes     Physician: Betty Styles MD    Visit Date;7/21/23     Physician Orders: PT Eval and Treat   Medical Diagnosis from Referral: Chronic right sided LBP without sciatica.  Evaluation Date: 7/12/2023  Authorization Period Expiration: 6/20/24  Plan of Care Expiration: 9/15/23  Progress Note Due: 8/12/23  Visit # / Visits authorized: 3/ 12   FOTO: 83/100     Precautions: Standard Ca,no US.     Time In: 1257  Time Out: 1350  Total Billable Time: 53 minutes     PTA Visit #: 0/5       Subjective     Pt reports: dry needling helped, wants to do it again.  He was compliant with home exercise program.  Response to previous treatment: sore  Functional change: improving    Pain: 0/10  Location: bilateral back      Objective      Objective Measures updated at progress report unless specified.     Treatment     Parth received the treatments listed below:      therapeutic exercises to develop strength for 8 minutes including:  Bike x 8'.    manual therapy techniques: dry needling were applied to the: lumbar multifidus for 30 minutes, including:  HAs 10,14,16,22,24.    neuromuscular re-education activities to improve: Balance, Coordination, Kinesthetic, Sense, Proprioception, and Posture for 0 minutes. The following activities were included:      therapeutic activities to improve functional performance for 0  minutes, including:      supervised modalities after being cleared for contradictions: TENS:  Parth received TENS/IMS electrical stimulation for pain to the l/multifidus. Pt received continuous mode at a rate of 2 pps for 15 minutes. Parth tolerated treatment well without any adverse effects.     Patient Education and Home Exercises       Education provided:    - use heat if sore.    Assessment     Tolerated dry needling very well.    Parth Is progressing well towards his goals.   Pt prognosis is Good.     Pt will continue to benefit from skilled outpatient physical therapy to address the deficits listed in the problem list box on initial evaluation, provide pt/family education and to maximize pt's level of independence in the home and community environment.     Pt's spiritual, cultural and educational needs considered and pt agreeable to plan of care and goals.     Anticipated barriers to physical therapy: no    Goals:   SHORT TERM GOALS:  3 weeks  Progress Date met   Recent signs and systems trend is improving in order to progress towards Long term goals.  [] Met  [] Not Met  [x] Progressing     Patient will be independent with Home Exercise Program  in order to further progress and return to maximal function. [] Met  [] Not Met  [x] Progressing     Pain rating at Worst: 5 /10 in order to progress towards increased independence with activity. [] Met  [] Not Met  [x] Progressing     Patient will be able to correct postural deviations in sitting and standing, to decrease pain and promote postural awareness for injury prevention.  [] Met  [] Not Met  [x] Progressing     Patient will improve functional outcome (FOTO) score: by 5% to increase self-worth & perceived functional ability towards long term goals [] Met  [] Not Met  [x] Progressing        LONG TERM GOALS: 6 weeks  Progress Date met   Patient will return to normal activites of daily living, recreational, and work related activities with less pain and limitation.  [] Met  [] Not Met  [x] Progressing     Patient will improve range of motion  to stated goals in order to return to maximal functional potential. ROM WNL/WFL in all planes. [] Met  [] Not Met  [x] Progressing     Patient will improve Strength to stated goals of appropriate musculature in order to improve functional independence. Strength of l/spine 5/5  in all planes. [] Met  [] Not Met  [x] Progressing     Pain Rating at Best: 1/10 to improve Quality of Life. Pt will normalize posture. [] Met  [] Not Met  [x] Progressing     Patient will meet predicted functional outcome (FOTO) score: 15% to increase self-worth & perceived functional ability. [] Met  [] Not Met  [x] Progressing     Patient will have met/partially met personal goal of: reduce pain and return to previous LOF. [] Met  [] Not Met  [x] Progressing      Plan     Progress as able.    Braxton Alvarez, PT

## 2023-08-04 ENCOUNTER — CLINICAL SUPPORT (OUTPATIENT)
Dept: REHABILITATION | Facility: HOSPITAL | Age: 63
End: 2023-08-04
Payer: COMMERCIAL

## 2023-08-04 DIAGNOSIS — G89.29 CHRONIC LOW BACK PAIN WITHOUT SCIATICA, UNSPECIFIED BACK PAIN LATERALITY: Primary | ICD-10-CM

## 2023-08-04 DIAGNOSIS — M54.50 CHRONIC LOW BACK PAIN WITHOUT SCIATICA, UNSPECIFIED BACK PAIN LATERALITY: Primary | ICD-10-CM

## 2023-08-04 PROCEDURE — 97110 THERAPEUTIC EXERCISES: CPT | Mod: PN

## 2023-08-04 PROCEDURE — 97112 NEUROMUSCULAR REEDUCATION: CPT | Mod: PN

## 2023-08-04 PROCEDURE — 97530 THERAPEUTIC ACTIVITIES: CPT | Mod: PN

## 2023-08-04 NOTE — PROGRESS NOTES
[facility]   Physical Therapy Treatment Note      Name: Parth Juarez  Clinic Number: [id number]    Therapy Diagnosis: Chronic LBP  Physician: Betty Styles MD    Visit Date: 8/4/2023    Physician Orders: PT Eval and Treat   Medical Diagnosis from Referral: Chronic right sided LBP without sciatica.  Evaluation Date: 7/12/2023  Authorization Period Expiration: 6/20/24  Plan of Care Expiration: 9/15/23  Progress Note Due: 8/12/23  Visit # / Visits authorized: 5/ 12 ([id number])  FOTO: 83/100     Precautions: Standard Ca,no US.     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 / Plan: [employer] [place] / Product Type: Self Funded /     Subjective     Pt reports: pain has decreased in R hip/buttock area    He was compliant with home exercise program   Response to previous treatment: positive  Functional change: none reported    Pain: 0/10  Location: Right side back    Objective        Treatment     Parth received the treatments listed below:      therapeutic exercises to develop Range of Motion, strength, flexibility for 20 minutes including:  NMRE utilized to activate appropriate mm to improve posture, core and lumbar stabilization and lumbopelvic stability: 23 minutes   THERAPEUTIC ACTIVITY x 12 minutes to improve functional activities:    Bike x 10    TABLE:  L/s rotations, 10x Bilateral   THERAPEUTIC ACTIVITY   Open Books, 10x Bilateral  THERAPEUTIC ACTIVITY   Ab bracing, 20x5s   NOT PERFORMED   Supine Green Theraband hip abduction, 20x NMRE  Green Theraband Bridge, 20x   NMRE   Sciatic nerve glide supine,  3x10 Bilateral   Hip ball adduction squeeze, 20x  NMRE  Piriformis stretch, pull across, [id number] Bilateral    Prone forearm stretch, [id number]  Prone quad stretch with strap, 6u37kpn Bilateral  NOT PERFORMED     STAND:  RIGHT only Quadratus Lumborum stretch, 5x10 sec NOT PERFORMED   Red Theraband  Bilateral External Rotation, 20x   Red Theraband Shoulder extension, 20-30x  NOT PERFORMED   Hip 3 way 2 x 10 Bilateral, green disc    THERAPEUTIC ACTIVITY     SEATED:  Thoracic extension stretch, hands behind head, 10x  standing   Piriformis stretch, 5f87woy Bilateral (for work)   REVIEWED   THERAPEUTIC ACTIVITY   Marching sitting on ball 30    Cable Column: Retro walk 17# 10  CC;Shoulder ext rowing 7# 30  Upright posture exercises sitting on ball    Patient Education and Home Exercises       Education provided:   - daily HEP given today    Written Home Exercises Provided: yes. Exercises were reviewed and Parth was able to demonstrate them prior to the end of the session.  Parth demonstrated good  understanding of the education provided. See EMR under Patient Instructions for exercises provided during therapy sessions    Assessment     Parth reports pain with washing dishes.  Reviewed to place foot on edge of open cabinet or small stool to take pressure off of his back.  Reviewed HOME EXERCISE PROGRAM and performing exercises while sitting at desk.  Decreased lumbar lordosis, pt with difficulty with prone press ups.  Decreased Bilateral hamstring flexibility.  Pt reported decreased pain at conclusion of therpay.     Parth Is progressing well towards his goals.   Pt prognosis is Excellent.     Pt will continue to benefit from skilled outpatient physical therapy to address the deficits listed in the problem list box on initial evaluation, provide pt/family education and to maximize pt's level of independence in the home and community environment.     Pt's spiritual, cultural and educational needs considered and pt agreeable to plan of care and goals.     Anticipated barriers to physical therapy: none    Goals:   Goals  SHORT TERM GOALS:  3 weeks  Progress Date met   Recent signs and systems trend is improving in order to progress towards Long term goals.  [] Met  [] Not Met  [] Progressing     Patient will be  independent with Home Exercise Program  in order to further progress and return to maximal function. [] Met  [] Not Met  [] Progressing     Pain rating at Worst: 5 /10 in order to progress towards increased independence with activity. [] Met  [] Not Met  [] Progressing     Patient will be able to correct postural deviations in sitting and standing, to decrease pain and promote postural awareness for injury prevention.  [] Met  [] Not Met  [] Progressing     Patient will improve functional outcome (FOTO) score: by 5% to increase self-worth & perceived functional ability towards long term goals [] Met  [] Not Met  [] Progressing        LONG TERM GOALS: 6 weeks  Progress Date met   Patient will return to normal activites of daily living, recreational, and work related activities with less pain and limitation.  [] Met  [] Not Met  [] Progressing     Patient will improve range of motion  to stated goals in order to return to maximal functional potential. ROM WNL/WFL in all planes. [] Met  [] Not Met  [] Progressing     Patient will improve Strength to stated goals of appropriate musculature in order to improve functional independence. Strength of l/spine 5/5 in all planes. [] Met  [] Not Met  [] Progressing     Pain Rating at Best: 1/10 to improve Quality of Life. Pt will normalize posture. [] Met  [] Not Met  [] Progressing     Patient will meet predicted functional outcome (FOTO) score: 15% to increase self-worth & perceived functional ability. [] Met  [] Not Met  [] Progressing     Patient will have met/partially met personal goal of: reduce pain and return to previous LOF. [] Met  [] Not Met  [] Progressing          Plan     Continue PT as deemed per POC: core strengthening, hip flexibility, possible heel lift in left shoe (TBD)    Braxton Alvarez, PT

## 2023-08-09 ENCOUNTER — CLINICAL SUPPORT (OUTPATIENT)
Dept: REHABILITATION | Facility: HOSPITAL | Age: 63
End: 2023-08-09
Payer: COMMERCIAL

## 2023-08-09 DIAGNOSIS — G89.29 CHRONIC LOW BACK PAIN WITHOUT SCIATICA, UNSPECIFIED BACK PAIN LATERALITY: Primary | ICD-10-CM

## 2023-08-09 DIAGNOSIS — M54.50 CHRONIC LOW BACK PAIN WITHOUT SCIATICA, UNSPECIFIED BACK PAIN LATERALITY: Primary | ICD-10-CM

## 2023-08-09 PROCEDURE — 97014 ELECTRIC STIMULATION THERAPY: CPT | Mod: PN

## 2023-08-09 PROCEDURE — 97140 MANUAL THERAPY 1/> REGIONS: CPT | Mod: PN

## 2023-08-09 PROCEDURE — 97110 THERAPEUTIC EXERCISES: CPT | Mod: PN

## 2023-08-09 NOTE — PROGRESS NOTES
[facility]   Physical Therapy Treatment Note      Name: Parth Juarez  Clinic Number: [id number]    Therapy Diagnosis:   Encounter Diagnosis   Name Primary?    Chronic low back pain without sciatica, unspecified back pain laterality Yes     Physician: Betty Styles MD    Visit Date;7/21/23     Physician Orders: PT Eval and Treat   Medical Diagnosis from Referral: Chronic right sided LBP without sciatica.  Evaluation Date: 7/12/2023  Authorization Period Expiration: 6/20/24  Plan of Care Expiration: 9/15/23  Progress Note Due: 8/12/23  Visit # / Visits authorized: 4/ 12   FOTO: 83/100     Precautions: Standard Ca,no US.     Time In: 1055  Time Out: 1150  Total Billable Time: 53 minutes     PTA Visit #: 0/5       Subjective     Pt reports: dry needling helped, wants to do it again.  He was compliant with home exercise program.  Response to previous treatment: sore  Functional change: improving    Pain: 0-1/10  Location: bilateral back      Objective      Objective Measures updated at progress report unless specified.     Treatment     Parth received the treatments listed below:      therapeutic exercises to develop strength for 8 minutes including:  Bike x 8'.    manual therapy techniques: dry needling were applied to the: lumbar multifidus for 30 minutes, including:  HAs 10,14,16,22,24.    neuromuscular re-education activities to improve: Balance, Coordination, Kinesthetic, Sense, Proprioception, and Posture for 0 minutes. The following activities were included:      therapeutic activities to improve functional performance for 0  minutes, including:      supervised modalities after being cleared for contradictions: TENS:  Parth received TENS/IMS electrical stimulation for pain to the l/multifidus. Pt received continuous mode at a rate of 2 pps for 15 minutes. Parth tolerated treatment well without any adverse effects.     Patient Education and Home Exercises       Education provided:    - use heat if sore.    Assessment     Tolerated dry needling very well.  No pain following DN.  Parth Is progressing well towards his goals.   Pt prognosis is Good.     Pt will continue to benefit from skilled outpatient physical therapy to address the deficits listed in the problem list box on initial evaluation, provide pt/family education and to maximize pt's level of independence in the home and community environment.     Pt's spiritual, cultural and educational needs considered and pt agreeable to plan of care and goals.     Anticipated barriers to physical therapy: no    Goals:   SHORT TERM GOALS:  3 weeks  Progress Date met   Recent signs and systems trend is improving in order to progress towards Long term goals.  [] Met  [] Not Met  [x] Progressing     Patient will be independent with Home Exercise Program  in order to further progress and return to maximal function. [] Met  [] Not Met  [x] Progressing     Pain rating at Worst: 5 /10 in order to progress towards increased independence with activity. [] Met  [] Not Met  [x] Progressing     Patient will be able to correct postural deviations in sitting and standing, to decrease pain and promote postural awareness for injury prevention.  [] Met  [] Not Met  [x] Progressing     Patient will improve functional outcome (FOTO) score: by 5% to increase self-worth & perceived functional ability towards long term goals [] Met  [] Not Met  [x] Progressing        LONG TERM GOALS: 6 weeks  Progress Date met   Patient will return to normal activites of daily living, recreational, and work related activities with less pain and limitation.  [] Met  [] Not Met  [x] Progressing     Patient will improve range of motion  to stated goals in order to return to maximal functional potential. ROM WNL/WFL in all planes. [] Met  [] Not Met  [x] Progressing     Patient will improve Strength to stated goals of appropriate musculature in order to improve functional independence.  Strength of l/spine 5/5 in all planes. [] Met  [] Not Met  [x] Progressing     Pain Rating at Best: 1/10 to improve Quality of Life. Pt will normalize posture. [] Met  [] Not Met  [x] Progressing     Patient will meet predicted functional outcome (FOTO) score: 15% to increase self-worth & perceived functional ability. [] Met  [] Not Met  [x] Progressing     Patient will have met/partially met personal goal of: reduce pain and return to previous LOF. [] Met  [] Not Met  [x] Progressing      Plan     Progress as able.    Braxton Alvarez, PT

## 2023-08-11 ENCOUNTER — CLINICAL SUPPORT (OUTPATIENT)
Dept: REHABILITATION | Facility: HOSPITAL | Age: 63
End: 2023-08-11
Payer: COMMERCIAL

## 2023-08-11 DIAGNOSIS — G89.29 CHRONIC LOW BACK PAIN WITHOUT SCIATICA, UNSPECIFIED BACK PAIN LATERALITY: Primary | ICD-10-CM

## 2023-08-11 DIAGNOSIS — M54.50 CHRONIC LOW BACK PAIN WITHOUT SCIATICA, UNSPECIFIED BACK PAIN LATERALITY: Primary | ICD-10-CM

## 2023-08-11 PROCEDURE — 97530 THERAPEUTIC ACTIVITIES: CPT | Mod: PN

## 2023-08-11 PROCEDURE — 97110 THERAPEUTIC EXERCISES: CPT | Mod: PN

## 2023-08-11 PROCEDURE — 97112 NEUROMUSCULAR REEDUCATION: CPT | Mod: PN

## 2023-08-11 NOTE — PROGRESS NOTES
[facility]   Physical Therapy Treatment Note      Name: Parth Juarez  Clinic Number: [id number]    Therapy Diagnosis: Chronic LBP  Physician: Betty Styles MD    Visit Date: 8/11/2023    Physician Orders: PT Eval and Treat   Medical Diagnosis from Referral: Chronic right sided LBP without sciatica.  Evaluation Date: 7/12/2023  Authorization Period Expiration: 6/20/24  Plan of Care Expiration: 9/15/23  Progress Note Due: 8/12/23  Visit # / Visits authorized: 6/ 12 ([id number])  FOTO: 83/100     Precautions: Standard Ca,no US.     Time In: 0700  Time Out: 0755  Total Billable Time: 55 minutes  Insurance: Payor: BL[employer] / Plan: [employer] [place] / Product Type: Self Funded /     Subjective     Pt reports: pain has decreased in R hip/buttock area    He was compliant with home exercise program   Response to previous treatment: positive  Functional change: none reported    Pain: 0/10  Location: Right side back    Objective        Treatment     Parth received the treatments listed below:      therapeutic exercises to develop Range of Motion, strength, flexibility for 20 minutes including:  NMRE utilized to activate appropriate mm to improve posture, core and lumbar stabilization and lumbopelvic stability: 23 minutes   THERAPEUTIC ACTIVITY x 12 minutes to improve functional activities:    Bike x 10    TABLE:  L/s rotations, 10x Bilateral   THERAPEUTIC ACTIVITY   Open Books, 10x Bilateral  THERAPEUTIC ACTIVITY   Ab bracing, 20x5s   NOT PERFORMED   Supine Green Theraband hip abduction, 20x NMRE  Green Theraband Bridge, 20x   NMRE   Sciatic nerve glide supine,  3x10 Bilateral   Hip ball adduction squeeze, 30x  NMRE  Piriformis stretch, pull across, 1d93fta Bilateral    Prone forearm stretch, [id number]  Prone quad stretch with strap, 4r61jsv Bilateral  NOT PERFORMED     STAND:  RIGHT only Quadratus Lumborum stretch, 5x10 sec NOT PERFORMED   Red Theraband  Bilateral External Rotation, 20x   Red Theraband Shoulder extension, 20-30x  NOT PERFORMED   Hip 3 way 2 x 10 Bilateral, green disc    THERAPEUTIC ACTIVITY     SEATED:  Thoracic extension stretch, hands behind head, 10x  standing   Piriformis stretch, 3x49ipo Bilateral (for work)   REVIEWED   THERAPEUTIC ACTIVITY   Marching sitting on ball 30    Cable Column: Retro walk 17# 10  CC;Shoulder ext/ rowing 7# 30  Upright posture exercises sitting on ball    Patient Education and Home Exercises       Education provided:   - daily HEP given today    Written Home Exercises Provided: yes. Exercises were reviewed and Parth was able to demonstrate them prior to the end of the session.  Parth demonstrated good  understanding of the education provided. See EMR under Patient Instructions for exercises provided during therapy sessions    Assessment     Parth reports pain with washing dishes.  Reviewed to place foot on edge of open cabinet or small stool to take pressure off of his back.  Reviewed HOME EXERCISE PROGRAM and performing exercises while sitting at desk.  Decreased lumbar lordosis, pt with difficulty with prone press ups.  Decreased Bilateral hamstring flexibility.  Pt reported decreased pain at conclusion of therpay.     Parth Is progressing well towards his goals.   Pt prognosis is Excellent.     Pt will continue to benefit from skilled outpatient physical therapy to address the deficits listed in the problem list box on initial evaluation, provide pt/family education and to maximize pt's level of independence in the home and community environment.     Pt's spiritual, cultural and educational needs considered and pt agreeable to plan of care and goals.     Anticipated barriers to physical therapy: none    Goals:   Goals  SHORT TERM GOALS:  3 weeks  Progress Date met   Recent signs and systems trend is improving in order to progress towards Long term goals.  [x] Met  [] Not Met  [] Progressing     Patient will  be independent with Home Exercise Program  in order to further progress and return to maximal function. [x] Met  [] Not Met  [] Progressing     Pain rating at Worst: 5 /10 in order to progress towards increased independence with activity. [x] Met  [] Not Met  [] Progressing     Patient will be able to correct postural deviations in sitting and standing, to decrease pain and promote postural awareness for injury prevention.  [x] Met  [] Not Met  [] Progressing     Patient will improve functional outcome (FOTO) score: by 5% to increase self-worth & perceived functional ability towards long term goals [x] Met  [] Not Met  [] Progressing        LONG TERM GOALS: 6 weeks  Progress Date met   Patient will return to normal activites of daily living, recreational, and work related activities with less pain and limitation.  [x] Met  [] Not Met  [] Progressing     Patient will improve range of motion  to stated goals in order to return to maximal functional potential. ROM WNL/WFL in all planes. [x] Met  [] Not Met  [] Progressing     Patient will improve Strength to stated goals of appropriate musculature in order to improve functional independence. Strength of l/spine 5/5 in all planes. [x] Met  [] Not Met  [] Progressing     Pain Rating at Best: 1/10 to improve Quality of Life. Pt will normalize posture. [x] Met  [] Not Met  [] Progressing     Patient will meet predicted functional outcome (FOTO) score: 15% to increase self-worth & perceived functional ability. [x] Met  [] Not Met  [] Progressing     Patient will have met/partially met personal goal of: reduce pain and return to previous LOF. [x] Met  [] Not Met  [] Progressing          Plan     Continue PT as deemed per POC: core strengthening, hip flexibility, possible heel lift in left shoe (TBD)    Braxton Alvarez, PT

## 2023-08-16 ENCOUNTER — CLINICAL SUPPORT (OUTPATIENT)
Dept: REHABILITATION | Facility: HOSPITAL | Age: 63
End: 2023-08-16
Payer: COMMERCIAL

## 2023-08-16 DIAGNOSIS — M54.50 CHRONIC LOW BACK PAIN WITHOUT SCIATICA, UNSPECIFIED BACK PAIN LATERALITY: Primary | ICD-10-CM

## 2023-08-16 DIAGNOSIS — G89.29 CHRONIC LOW BACK PAIN WITHOUT SCIATICA, UNSPECIFIED BACK PAIN LATERALITY: Primary | ICD-10-CM

## 2023-08-16 PROCEDURE — 97110 THERAPEUTIC EXERCISES: CPT | Mod: PN

## 2023-08-16 PROCEDURE — 97014 ELECTRIC STIMULATION THERAPY: CPT | Mod: PN

## 2023-08-16 PROCEDURE — 97140 MANUAL THERAPY 1/> REGIONS: CPT | Mod: PN

## 2023-08-16 NOTE — PROGRESS NOTES
[facility]   Physical Therapy Treatment Note      Name: Parth Juarez  Clinic Number: [id number]    Therapy Diagnosis:   Encounter Diagnosis   Name Primary?    Chronic low back pain without sciatica, unspecified back pain laterality Yes     Physician: Betty Styles MD    Visit Date;8/16/23     Physician Orders: PT Eval and Treat   Medical Diagnosis from Referral: Chronic right sided LBP without sciatica.  Evaluation Date: 7/12/2023  Authorization Period Expiration: 6/20/24  Plan of Care Expiration: 9/15/23  Progress Note Due: 8/12/23  Visit # / Visits authorized: 4/ 12   FOTO: 83/100     Precautions: Standard Ca,no US.     Time In: 1055  Time Out: 1150  Total Billable Time: 53 minutes     PTA Visit #: 0/5       Subjective     Pt reports: dry needling helped, wants to do it again.Pt reports increased pain due to some jumping activities.  He was compliant with home exercise program.  Response to previous treatment: sore  Functional change: improving    Pain: 4/10  Location: bilateral back      Objective      Objective Measures updated at progress report unless specified.     Treatment     Parth received the treatments listed below:      therapeutic exercises to develop strength for 8 minutes including:  Bike x 8'.    manual therapy techniques: dry needling were applied to the: lumbar multifidus for 30 minutes, including:  HAs 10,14,16,22,24.    neuromuscular re-education activities to improve: Balance, Coordination, Kinesthetic, Sense, Proprioception, and Posture for 0 minutes. The following activities were included:      therapeutic activities to improve functional performance for 0  minutes, including:      supervised modalities after being cleared for contradictions: TENS:  Parth received TENS/IMS electrical stimulation for pain to the l/multifidus. Pt received continuous mode at a rate of 2 pps for 15 minutes. Parth tolerated treatment well without any adverse effects.     Patient  Education and Home Exercises       Education provided:   - use heat if sore.    Assessment     Tolerated dry needling very well.  No pain following DN.  Parth Is progressing well towards his goals.   Pt prognosis is Good.     Pt will continue to benefit from skilled outpatient physical therapy to address the deficits listed in the problem list box on initial evaluation, provide pt/family education and to maximize pt's level of independence in the home and community environment.     Pt's spiritual, cultural and educational needs considered and pt agreeable to plan of care and goals.     Anticipated barriers to physical therapy: no    Goals:   SHORT TERM GOALS:  3 weeks  Progress Date met   Recent signs and systems trend is improving in order to progress towards Long term goals.  [] Met  [] Not Met  [x] Progressing     Patient will be independent with Home Exercise Program  in order to further progress and return to maximal function. [] Met  [] Not Met  [x] Progressing     Pain rating at Worst: 5 /10 in order to progress towards increased independence with activity. [] Met  [] Not Met  [x] Progressing     Patient will be able to correct postural deviations in sitting and standing, to decrease pain and promote postural awareness for injury prevention.  [] Met  [] Not Met  [x] Progressing     Patient will improve functional outcome (FOTO) score: by 5% to increase self-worth & perceived functional ability towards long term goals [] Met  [] Not Met  [x] Progressing        LONG TERM GOALS: 6 weeks  Progress Date met   Patient will return to normal activites of daily living, recreational, and work related activities with less pain and limitation.  [] Met  [] Not Met  [x] Progressing     Patient will improve range of motion  to stated goals in order to return to maximal functional potential. ROM WNL/WFL in all planes. [] Met  [] Not Met  [x] Progressing     Patient will improve Strength to stated goals of appropriate  musculature in order to improve functional independence. Strength of l/spine 5/5 in all planes. [] Met  [] Not Met  [x] Progressing     Pain Rating at Best: 1/10 to improve Quality of Life. Pt will normalize posture. [] Met  [] Not Met  [x] Progressing     Patient will meet predicted functional outcome (FOTO) score: 15% to increase self-worth & perceived functional ability. [] Met  [] Not Met  [x] Progressing     Patient will have met/partially met personal goal of: reduce pain and return to previous LOF. [] Met  [] Not Met  [x] Progressing      Plan     Progress as able.    Braxton Alvarez, PT

## 2023-08-18 ENCOUNTER — CLINICAL SUPPORT (OUTPATIENT)
Dept: REHABILITATION | Facility: HOSPITAL | Age: 63
End: 2023-08-18
Payer: COMMERCIAL

## 2023-08-18 DIAGNOSIS — M54.50 CHRONIC LOW BACK PAIN WITHOUT SCIATICA, UNSPECIFIED BACK PAIN LATERALITY: Primary | ICD-10-CM

## 2023-08-18 DIAGNOSIS — G89.29 CHRONIC LOW BACK PAIN WITHOUT SCIATICA, UNSPECIFIED BACK PAIN LATERALITY: Primary | ICD-10-CM

## 2023-08-18 PROCEDURE — 97110 THERAPEUTIC EXERCISES: CPT | Mod: PN

## 2023-08-18 PROCEDURE — 97530 THERAPEUTIC ACTIVITIES: CPT | Mod: PN

## 2023-08-18 PROCEDURE — 97112 NEUROMUSCULAR REEDUCATION: CPT | Mod: PN

## 2023-08-18 NOTE — PROGRESS NOTES
[facility]   Physical Therapy Treatment Note      Name: Parth Juarez  Clinic Number: [id number]    Therapy Diagnosis: Chronic LBP  Physician: Betty Styles MD    Visit Date: 8/18/2023    Physician Orders: PT Eval and Treat   Medical Diagnosis from Referral: Chronic right sided LBP without sciatica.  Evaluation Date: 7/12/2023  Authorization Period Expiration: 6/20/24  Plan of Care Expiration: 9/15/23  Progress Note Due: 8/12/23  Visit # / Visits authorized: 6/ 12 ([id number])  FOTO: 83/100     Precautions: Standard Ca,no US.     Time In: 0700  Time Out: 0755  Total Billable Time: 55 minutes  Insurance: Payor: [employer] / Plan: [employer] [place] / Product Type: Self Funded /     Subjective     Pt reports: pain has decreased in R hip/buttock area    He was compliant with home exercise program   Response to previous treatment: positive  Functional change: none reported    Pain: 0/10  Location: Right side back    Objective        Treatment     Parth received the treatments listed below:      therapeutic exercises to develop Range of Motion, strength, flexibility for 20 minutes including:  NMRE utilized to activate appropriate mm to improve posture, core and lumbar stabilization and lumbopelvic stability: 23 minutes   THERAPEUTIC ACTIVITY x 12 minutes to improve functional activities:    Bike x 10    TABLE:  L/s rotations, 10x Bilateral   THERAPEUTIC ACTIVITY   Open Books, 10x Bilateral  THERAPEUTIC ACTIVITY   Ab bracing, 20x5s   NOT PERFORMED   Supine Green Theraband hip abduction, 20x NMRE  Green Theraband Bridge, 20x   NMRE   Sciatic nerve glide supine,  3x10 Bilateral   Hip ball adduction squeeze, 30x  NMRE  Piriformis stretch, pull across, [id number] Bilateral    Prone forearm stretch, 3h55xyt  Prone quad stretch with strap, 1x24ayy Bilateral  NOT PERFORMED     STAND:  RIGHT only Quadratus Lumborum stretch, 5x10 sec NOT PERFORMED   Red Theraband  Bilateral External Rotation, 20x   Red Theraband Shoulder extension, 20-30x  NOT PERFORMED   Hip 3 way 2 x 10 Bilateral, green disc    THERAPEUTIC ACTIVITY     SEATED:  Thoracic extension stretch, hands behind head, 10x  standing   Piriformis stretch, [id number] Bilateral (for work)   REVIEWED   THERAPEUTIC ACTIVITY   Marching sitting on ball 30    Cable Column: Retro walk 17# 10  CC;Shoulder ext/ rowing 7# 30  Upright posture exercises sitting on ball    Patient Education and Home Exercises       Education provided:   - daily HEP given today    Written Home Exercises Provided: yes. Exercises were reviewed and Parth was able to demonstrate them prior to the end of the session.  Parth demonstrated good  understanding of the education provided. See EMR under Patient Instructions for exercises provided during therapy sessions    Assessment     Parth reports pain with washing dishes.  Reviewed to place foot on edge of open cabinet or small stool to take pressure off of his back.  Reviewed HOME EXERCISE PROGRAM and performing exercises while sitting at desk.  Decreased lumbar lordosis, pt with difficulty with prone press ups.  Decreased Bilateral hamstring flexibility.  Pt reported decreased pain at conclusion of therpay.     Parth Is progressing well towards his goals.   Pt prognosis is Excellent.     Pt will continue to benefit from skilled outpatient physical therapy to address the deficits listed in the problem list box on initial evaluation, provide pt/family education and to maximize pt's level of independence in the home and community environment.     Pt's spiritual, cultural and educational needs considered and pt agreeable to plan of care and goals.     Anticipated barriers to physical therapy: none    Goals:   Goals  SHORT TERM GOALS:  3 weeks  Progress Date met   Recent signs and systems trend is improving in order to progress towards Long term goals.  [x] Met  [] Not Met  [] Progressing     Patient will  be independent with Home Exercise Program  in order to further progress and return to maximal function. [x] Met  [] Not Met  [] Progressing     Pain rating at Worst: 5 /10 in order to progress towards increased independence with activity. [x] Met  [] Not Met  [] Progressing     Patient will be able to correct postural deviations in sitting and standing, to decrease pain and promote postural awareness for injury prevention.  [x] Met  [] Not Met  [] Progressing     Patient will improve functional outcome (FOTO) score: by 5% to increase self-worth & perceived functional ability towards long term goals [x] Met  [] Not Met  [] Progressing        LONG TERM GOALS: 6 weeks  Progress Date met   Patient will return to normal activites of daily living, recreational, and work related activities with less pain and limitation.  [x] Met  [] Not Met  [] Progressing     Patient will improve range of motion  to stated goals in order to return to maximal functional potential. ROM WNL/WFL in all planes. [x] Met  [] Not Met  [] Progressing     Patient will improve Strength to stated goals of appropriate musculature in order to improve functional independence. Strength of l/spine 5/5 in all planes. [x] Met  [] Not Met  [] Progressing     Pain Rating at Best: 1/10 to improve Quality of Life. Pt will normalize posture. [x] Met  [] Not Met  [] Progressing     Patient will meet predicted functional outcome (FOTO) score: 15% to increase self-worth & perceived functional ability. [x] Met  [] Not Met  [] Progressing     Patient will have met/partially met personal goal of: reduce pain and return to previous LOF. [x] Met  [] Not Met  [] Progressing          Plan     Continue PT as deemed per POC: core strengthening, hip flexibility, possible heel lift in left shoe (TBD)    Braxton Alvarez, PT

## 2023-08-23 ENCOUNTER — CLINICAL SUPPORT (OUTPATIENT)
Dept: REHABILITATION | Facility: HOSPITAL | Age: 63
End: 2023-08-23
Payer: COMMERCIAL

## 2023-08-23 DIAGNOSIS — M54.50 CHRONIC LOW BACK PAIN WITHOUT SCIATICA, UNSPECIFIED BACK PAIN LATERALITY: Primary | ICD-10-CM

## 2023-08-23 DIAGNOSIS — G89.29 CHRONIC LOW BACK PAIN WITHOUT SCIATICA, UNSPECIFIED BACK PAIN LATERALITY: Primary | ICD-10-CM

## 2023-08-23 PROCEDURE — 97530 THERAPEUTIC ACTIVITIES: CPT | Mod: PN

## 2023-08-23 PROCEDURE — 97112 NEUROMUSCULAR REEDUCATION: CPT | Mod: PN

## 2023-08-23 PROCEDURE — 97110 THERAPEUTIC EXERCISES: CPT | Mod: PN

## 2023-08-23 NOTE — PROGRESS NOTES
[facility]   Physical Therapy Treatment Note      Name: Parth Juarez  Clinic Number: [id number]    Therapy Diagnosis: Chronic LBP  Physician: Betty Styles MD    Visit Date: 8/23/2023    Physician Orders: PT Eval and Treat   Medical Diagnosis from Referral: Chronic right sided LBP without sciatica.  Evaluation Date: 7/12/2023  Authorization Period Expiration: 6/20/24  Plan of Care Expiration: 9/15/23  Progress Note Due: 8/12/23  Visit # / Visits authorized: 6/ 12 ([id number])  FOTO: 83/100     Precautions: Standard Ca,no US.     Time In: 1050  Time Out: 1145  Total Billable Time: 55 minutes  Insurance: Payor: [employer]EFIT / Plan: [employer] [place] / Product Type: Self Funded /     Subjective     Pt reports: pain has decreased in R hip/buttock area    He was compliant with home exercise program   Response to previous treatment: positive  Functional change: none reported    Pain: 1/10  Location: Right side back    Objective        Treatment     Parth received the treatments listed below:      therapeutic exercises to develop Range of Motion, strength, flexibility for 20 minutes including:  NMRE utilized to activate appropriate mm to improve posture, core and lumbar stabilization and lumbopelvic stability: 23 minutes   THERAPEUTIC ACTIVITY x 12 minutes to improve functional activities:    Bike x 10    TABLE:  L/s rotations, 10x Bilateral   THERAPEUTIC ACTIVITY   Open Books, 10x Bilateral  THERAPEUTIC ACTIVITY   Ab bracing, 20x5s   NOT PERFORMED   Supine Green Theraband hip abduction, 20x NMRE  Green Theraband Bridge, 20x   NMRE   Sciatic nerve glide supine,  3x10 Bilateral   Hip ball adduction squeeze, 30x  NMRE  Piriformis stretch, pull across, [id number] Bilateral    Prone forearm stretch, 5u35upy  Prone quad stretch with strap, 7v97unh Bilateral  NOT PERFORMED     STAND:  RIGHT only Quadratus Lumborum stretch, 5x10 sec NOT PERFORMED   Red Theraband  Bilateral External Rotation, 20x   Red Theraband Shoulder extension, 20-30x  NOT PERFORMED   Hip 3 way 2 x 10 Bilateral, green disc    THERAPEUTIC ACTIVITY     SEATED:  Thoracic extension stretch, hands behind head, 10x  standing   Piriformis stretch, 5d43oto Bilateral (for work)   REVIEWED   THERAPEUTIC ACTIVITY   Marching sitting on ball 30    Cable Column: Retro walk 17# 10  CC;Shoulder ext/ rowing 7# 30  Upright posture exercises sitting on ball    Patient Education and Home Exercises       Education provided:   - daily HEP given today    Written Home Exercises Provided: yes. Exercises were reviewed and Parth was able to demonstrate them prior to the end of the session.  Parth demonstrated good  understanding of the education provided. See EMR under Patient Instructions for exercises provided during therapy sessions    Assessment     Parth reports pain with washing dishes.  Reviewed to place foot on edge of open cabinet or small stool to take pressure off of his back.  Reviewed HOME EXERCISE PROGRAM and performing exercises while sitting at desk.  Decreased lumbar lordosis, pt with difficulty with prone press ups.  Decreased Bilateral hamstring flexibility.  Pt reported decreased pain at conclusion of therpay.     Parth Is progressing well towards his goals.   Pt prognosis is Excellent.     Pt will continue to benefit from skilled outpatient physical therapy to address the deficits listed in the problem list box on initial evaluation, provide pt/family education and to maximize pt's level of independence in the home and community environment.     Pt's spiritual, cultural and educational needs considered and pt agreeable to plan of care and goals.     Anticipated barriers to physical therapy: none    Goals:   Goals  SHORT TERM GOALS:  3 weeks  Progress Date met   Recent signs and systems trend is improving in order to progress towards Long term goals.  [x] Met  [] Not Met  [] Progressing     Patient will  be independent with Home Exercise Program  in order to further progress and return to maximal function. [x] Met  [] Not Met  [] Progressing     Pain rating at Worst: 5 /10 in order to progress towards increased independence with activity. [x] Met  [] Not Met  [] Progressing     Patient will be able to correct postural deviations in sitting and standing, to decrease pain and promote postural awareness for injury prevention.  [x] Met  [] Not Met  [] Progressing     Patient will improve functional outcome (FOTO) score: by 5% to increase self-worth & perceived functional ability towards long term goals [x] Met  [] Not Met  [] Progressing        LONG TERM GOALS: 6 weeks  Progress Date met   Patient will return to normal activites of daily living, recreational, and work related activities with less pain and limitation.  [x] Met  [] Not Met  [] Progressing     Patient will improve range of motion  to stated goals in order to return to maximal functional potential. ROM WNL/WFL in all planes. [x] Met  [] Not Met  [] Progressing     Patient will improve Strength to stated goals of appropriate musculature in order to improve functional independence. Strength of l/spine 5/5 in all planes. [x] Met  [] Not Met  [] Progressing     Pain Rating at Best: 1/10 to improve Quality of Life. Pt will normalize posture. [x] Met  [] Not Met  [] Progressing     Patient will meet predicted functional outcome (FOTO) score: 15% to increase self-worth & perceived functional ability. [x] Met  [] Not Met  [] Progressing     Patient will have met/partially met personal goal of: reduce pain and return to previous LOF. [x] Met  [] Not Met  [] Progressing          Plan     Continue PT as deemed per POC: core strengthening, hip flexibility, possible heel lift in left shoe (TBD)    Braxton Alvarez, PT

## 2023-08-25 ENCOUNTER — CLINICAL SUPPORT (OUTPATIENT)
Dept: REHABILITATION | Facility: HOSPITAL | Age: 63
End: 2023-08-25
Payer: COMMERCIAL

## 2023-08-25 DIAGNOSIS — G89.29 CHRONIC LOW BACK PAIN WITHOUT SCIATICA, UNSPECIFIED BACK PAIN LATERALITY: Primary | ICD-10-CM

## 2023-08-25 DIAGNOSIS — M54.50 CHRONIC LOW BACK PAIN WITHOUT SCIATICA, UNSPECIFIED BACK PAIN LATERALITY: Primary | ICD-10-CM

## 2023-08-25 PROCEDURE — 97112 NEUROMUSCULAR REEDUCATION: CPT | Mod: PN

## 2023-08-25 PROCEDURE — 97110 THERAPEUTIC EXERCISES: CPT | Mod: PN

## 2023-08-25 PROCEDURE — 97530 THERAPEUTIC ACTIVITIES: CPT | Mod: PN

## 2023-08-25 NOTE — PROGRESS NOTES
[facility]  PT Discharge Note    Name: Parth Juarez  [place] [facility] Number: [id number]    Therapy Diagnosis:   Encounter Diagnosis   Name Primary?    Chronic low back pain without sciatica, unspecified back pain laterality Yes     Physician: Betty Styles MD    Physician Orders: Eval and treat.  Medical Diagnosis: LBP  Evaluation Date: 7/12/23.      Date of Last visit: 8/25/23.  Total Visits Received: 12    ASSESSMENT      Pt responded to PT very well.    Discharge reason: Patient has met all of his/her goals    Discharge FOTO Score: 69/100    Goals: Met  Pain 0/10.  ROM WNL  Strength 5/5.  Pt is independent with HEP.    PLAN   This patient is discharged from Physical Therapy with HEP PRN.      Braxton Alvarez, PT

## 2023-08-25 NOTE — PROGRESS NOTES
[facility]   Physical Therapy Treatment Note      Name: Parth Juarez  Clinic Number: [id number]    Therapy Diagnosis: Chronic LBP  Physician: Betty Styles MD    Visit Date: 8/25/2023    Physician Orders: PT Eval and Treat   Medical Diagnosis from Referral: Chronic right sided LBP without sciatica.  Evaluation Date: 7/12/2023  Authorization Period Expiration: 6/20/24  Plan of Care Expiration: 9/15/23  Progress Note Due: 8/12/23  Visit # / Visits authorized: 6/ 12 ([id number])  FOTO: 83/100     Precautions: Standard Ca,no US.     Time In: 0700  Time Out: 0755  Total Billable Time: 55 minutes  Insurance: Payor: BLUE CROSS [employer]EFIT / Plan: [employer]UE CROSS [place] / Product Type: Self Funded /     Subjective     Pt reports: pain has decreased in R hip/buttock area    He was compliant with home exercise program   Response to previous treatment: positive  Functional change: none reported    Pain: 0/10  Location: Right side back    Objective        Treatment     Parth received the treatments listed below:      therapeutic exercises to develop Range of Motion, strength, flexibility for 20 minutes including:  NMRE utilized to activate appropriate mm to improve posture, core and lumbar stabilization and lumbopelvic stability: 23 minutes   THERAPEUTIC ACTIVITY x 12 minutes to improve functional activities:    Bike x 10    TABLE:  L/s rotations, 10x Bilateral   THERAPEUTIC ACTIVITY   Open Books, 10x Bilateral  THERAPEUTIC ACTIVITY   Ab bracing, 20x5s   NOT PERFORMED   Supine Green Theraband hip abduction, 20x NMRE  Green Theraband Bridge, 20x   NMRE   Sciatic nerve glide supine,  3x10 Bilateral   Hip ball adduction squeeze, 30x  NMRE  Piriformis stretch, pull across, [id number] Bilateral    Prone forearm stretch, [id number]  Prone quad stretch with strap, [id number] Bilateral  NOT PERFORMED     STAND:  RIGHT only Quadratus Lumborum stretch, 5x10 sec NOT PERFORMED   Red Theraband  Bilateral External Rotation, 20x   Red Theraband Shoulder extension, 20-30x  NOT PERFORMED   Hip 3 way 2 x 10 Bilateral, green disc    THERAPEUTIC ACTIVITY     SEATED:  Thoracic extension stretch, hands behind head, 10x  standing   Piriformis stretch, 6h61mhe Bilateral (for work)   REVIEWED   THERAPEUTIC ACTIVITY   Marching sitting on ball 30    Cable Column: Retro walk 17# 10  CC;Shoulder ext/ rowing 7# 30  Upright posture exercises sitting on ball    Patient Education and Home Exercises       Education provided:   - daily HEP given today    Written Home Exercises Provided: yes. Exercises were reviewed and Parth was able to demonstrate them prior to the end of the session.  aPrth demonstrated good  understanding of the education provided. See EMR under Patient Instructions for exercises provided during therapy sessions    Assessment     Goals met,DC per POC.  Parth Is progressing well towards his goals.   Pt prognosis is Excellent.     Pt's spiritual, cultural and educational needs considered and pt agreeable to plan of care and goals.     Anticipated barriers to physical therapy: none    Goals:   Goals  SHORT TERM GOALS:  3 weeks  Progress Date met   Recent signs and systems trend is improving in order to progress towards Long term goals.  [x] Met  [] Not Met  [] Progressing     Patient will be independent with Home Exercise Program  in order to further progress and return to maximal function. [x] Met  [] Not Met  [] Progressing     Pain rating at Worst: 5 /10 in order to progress towards increased independence with activity. [x] Met  [] Not Met  [] Progressing     Patient will be able to correct postural deviations in sitting and standing, to decrease pain and promote postural awareness for injury prevention.  [x] Met  [] Not Met  [] Progressing     Patient will improve functional outcome (FOTO) score: by 5% to increase self-worth & perceived functional ability towards long term goals [x] Met  [] Not  Met  [] Progressing        LONG TERM GOALS: 6 weeks  Progress Date met   Patient will return to normal activites of daily living, recreational, and work related activities with less pain and limitation.  [x] Met  [] Not Met  [] Progressing     Patient will improve range of motion  to stated goals in order to return to maximal functional potential. ROM WNL/WFL in all planes. [x] Met  [] Not Met  [] Progressing     Patient will improve Strength to stated goals of appropriate musculature in order to improve functional independence. Strength of l/spine 5/5 in all planes. [x] Met  [] Not Met  [] Progressing     Pain Rating at Best: 1/10 to improve Quality of Life. Pt will normalize posture. [x] Met  [] Not Met  [] Progressing     Patient will meet predicted functional outcome (FOTO) score: 15% to increase self-worth & perceived functional ability. [x] Met  [] Not Met  [] Progressing     Patient will have met/partially met personal goal of: reduce pain and return to previous LOF. [x] Met  [] Not Met  [] Progressing          Plan     DC per POC with HEP PRN.    Braxton Alvarez, PT

## 2023-09-09 DIAGNOSIS — F41.9 ANXIETY: ICD-10-CM

## 2023-09-09 NOTE — TELEPHONE ENCOUNTER
No care due was identified.  [facility] Embedded Care Due Messages. Reference number: [id number].   9/09/2023 12:29:39 PM CDT

## 2023-09-11 RX ORDER — CITALOPRAM 20 MG/1
20 TABLET, FILM COATED ORAL DAILY
Qty: 90 TABLET | Refills: 3 | Status: SHIPPED | OUTPATIENT
Start: 2023-09-11

## 2023-12-19 ENCOUNTER — LAB VISIT (OUTPATIENT)
Dept: LAB | Facility: HOSPITAL | Age: 63
End: 2023-12-19
Attending: FAMILY MEDICINE
Payer: COMMERCIAL

## 2023-12-19 DIAGNOSIS — E78.5 HYPERLIPIDEMIA, UNSPECIFIED HYPERLIPIDEMIA TYPE: ICD-10-CM

## 2023-12-19 DIAGNOSIS — R73.9 HYPERGLYCEMIA: ICD-10-CM

## 2023-12-19 LAB
ALBUMIN SERPL BCP-MCNC: 4 G/DL (ref 3.5–5.2)
ALP SERPL-CCNC: 86 U/L (ref 55–135)
ALT SERPL W/O P-5'-P-CCNC: 33 U/L (ref 10–44)
ANION GAP SERPL CALC-SCNC: 7 MMOL/L (ref 8–16)
AST SERPL-CCNC: 25 U/L (ref 10–40)
BASOPHILS # BLD AUTO: 0.03 K/UL (ref 0–0.2)
BASOPHILS NFR BLD: 0.8 % (ref 0–1.9)
BILIRUB SERPL-MCNC: 0.6 MG/DL (ref 0.1–1)
BUN SERPL-MCNC: 19 MG/DL (ref 8–23)
CALCIUM SERPL-MCNC: 9.5 MG/DL (ref 8.7–10.5)
CHLORIDE SERPL-SCNC: 106 MMOL/L (ref 95–110)
CHOLEST SERPL-MCNC: 148 MG/DL (ref 120–199)
CHOLEST/HDLC SERPL: 2.3 {RATIO} (ref 2–5)
CO2 SERPL-SCNC: 28 MMOL/L (ref 23–29)
CREAT SERPL-MCNC: 1.1 MG/DL (ref 0.5–1.4)
DIFFERENTIAL METHOD: ABNORMAL
EOSINOPHIL # BLD AUTO: 0.2 K/UL (ref 0–0.5)
EOSINOPHIL NFR BLD: 4.1 % (ref 0–8)
ERYTHROCYTE [DISTWIDTH] IN BLOOD BY AUTOMATED COUNT: 13.1 % (ref 11.5–14.5)
EST. GFR  (NO RACE VARIABLE): >60 ML/MIN/1.73 M^2
ESTIMATED AVG GLUCOSE: 108 MG/DL (ref 68–131)
GLUCOSE SERPL-MCNC: 87 MG/DL (ref 70–110)
HBA1C MFR BLD: 5.4 % (ref 4–5.6)
HCT VFR BLD AUTO: 42.6 % (ref 40–54)
HDLC SERPL-MCNC: 64 MG/DL (ref 40–75)
HDLC SERPL: 43.2 % (ref 20–50)
HGB BLD-MCNC: 14.2 G/DL (ref 14–18)
IMM GRANULOCYTES # BLD AUTO: 0 K/UL (ref 0–0.04)
IMM GRANULOCYTES NFR BLD AUTO: 0 % (ref 0–0.5)
LDLC SERPL CALC-MCNC: 74.2 MG/DL (ref 63–159)
LYMPHOCYTES # BLD AUTO: 1.4 K/UL (ref 1–4.8)
LYMPHOCYTES NFR BLD: 35.8 % (ref 18–48)
MCH RBC QN AUTO: 30.8 PG (ref 27–31)
MCHC RBC AUTO-ENTMCNC: 33.3 G/DL (ref 32–36)
MCV RBC AUTO: 92 FL (ref 82–98)
MONOCYTES # BLD AUTO: 0.5 K/UL (ref 0.3–1)
MONOCYTES NFR BLD: 12.3 % (ref 4–15)
NEUTROPHILS # BLD AUTO: 1.8 K/UL (ref 1.8–7.7)
NEUTROPHILS NFR BLD: 47 % (ref 38–73)
NONHDLC SERPL-MCNC: 84 MG/DL
NRBC BLD-RTO: 1 /100 WBC
PLATELET # BLD AUTO: 207 K/UL (ref 150–450)
PMV BLD AUTO: 10.8 FL (ref 9.2–12.9)
POTASSIUM SERPL-SCNC: 4.6 MMOL/L (ref 3.5–5.1)
PROT SERPL-MCNC: 7.2 G/DL (ref 6–8.4)
RBC # BLD AUTO: 4.61 M/UL (ref 4.6–6.2)
SODIUM SERPL-SCNC: 141 MMOL/L (ref 136–145)
TRIGL SERPL-MCNC: 49 MG/DL (ref 30–150)
WBC # BLD AUTO: 3.91 K/UL (ref 3.9–12.7)

## 2023-12-19 PROCEDURE — 80061 LIPID PANEL: CPT | Performed by: FAMILY MEDICINE

## 2023-12-19 PROCEDURE — 83036 HEMOGLOBIN GLYCOSYLATED A1C: CPT | Performed by: FAMILY MEDICINE

## 2023-12-19 PROCEDURE — 80053 COMPREHEN METABOLIC PANEL: CPT | Performed by: FAMILY MEDICINE

## 2023-12-19 PROCEDURE — 36415 COLL VENOUS BLD VENIPUNCTURE: CPT | Mod: PO | Performed by: FAMILY MEDICINE

## 2023-12-19 PROCEDURE — 85025 COMPLETE CBC W/AUTO DIFF WBC: CPT | Performed by: FAMILY MEDICINE

## 2023-12-25 ENCOUNTER — PATIENT MESSAGE (OUTPATIENT)
Dept: ADMINISTRATIVE | Facility: OTHER | Age: 63
End: 2023-12-25
Payer: COMMERCIAL

## 2023-12-26 ENCOUNTER — OFFICE VISIT (OUTPATIENT)
Dept: FAMILY MEDICINE | Facility: CLINIC | Age: 63
End: 2023-12-26
Payer: COMMERCIAL

## 2023-12-26 VITALS
BODY MASS INDEX: 29.27 KG/M2 | WEIGHT: 186.5 LBS | DIASTOLIC BLOOD PRESSURE: 70 MMHG | OXYGEN SATURATION: 97 % | SYSTOLIC BLOOD PRESSURE: 128 MMHG | HEIGHT: 67 IN | HEART RATE: 71 BPM | TEMPERATURE: 99 F

## 2023-12-26 DIAGNOSIS — H91.90 HEARING LOSS, UNSPECIFIED HEARING LOSS TYPE, UNSPECIFIED LATERALITY: ICD-10-CM

## 2023-12-26 DIAGNOSIS — R97.20 BPH WITH ELEVATED PSA: ICD-10-CM

## 2023-12-26 DIAGNOSIS — I10 HYPERTENSION, UNSPECIFIED TYPE: ICD-10-CM

## 2023-12-26 DIAGNOSIS — Z12.5 PROSTATE CANCER SCREENING: ICD-10-CM

## 2023-12-26 DIAGNOSIS — R73.9 HYPERGLYCEMIA: ICD-10-CM

## 2023-12-26 DIAGNOSIS — R80.9 POSITIVE FOR MICROALBUMINURIA: ICD-10-CM

## 2023-12-26 DIAGNOSIS — N40.0 BPH WITH ELEVATED PSA: ICD-10-CM

## 2023-12-26 DIAGNOSIS — E78.5 HYPERLIPIDEMIA, UNSPECIFIED HYPERLIPIDEMIA TYPE: Primary | ICD-10-CM

## 2023-12-26 DIAGNOSIS — D50.9 IRON DEFICIENCY ANEMIA, UNSPECIFIED IRON DEFICIENCY ANEMIA TYPE: ICD-10-CM

## 2023-12-26 DIAGNOSIS — G47.33 OSA ON CPAP: ICD-10-CM

## 2023-12-26 PROCEDURE — 1101F PR PT FALLS ASSESS DOC 0-1 FALLS W/OUT INJ PAST YR: ICD-10-PCS | Mod: CPTII,S$GLB,, | Performed by: FAMILY MEDICINE

## 2023-12-26 PROCEDURE — 99214 OFFICE O/P EST MOD 30 MIN: CPT | Mod: S$GLB,,, | Performed by: FAMILY MEDICINE

## 2023-12-26 PROCEDURE — 4010F ACE/ARB THERAPY RXD/TAKEN: CPT | Mod: CPTII,S$GLB,, | Performed by: FAMILY MEDICINE

## 2023-12-26 PROCEDURE — 3078F PR MOST RECENT DIASTOLIC BLOOD PRESSURE < 80 MM HG: ICD-10-PCS | Mod: CPTII,S$GLB,, | Performed by: FAMILY MEDICINE

## 2023-12-26 PROCEDURE — 3044F HG A1C LEVEL LT 7.0%: CPT | Mod: CPTII,S$GLB,, | Performed by: FAMILY MEDICINE

## 2023-12-26 PROCEDURE — 3288F PR FALLS RISK ASSESSMENT DOCUMENTED: ICD-10-PCS | Mod: CPTII,S$GLB,, | Performed by: FAMILY MEDICINE

## 2023-12-26 PROCEDURE — 3008F BODY MASS INDEX DOCD: CPT | Mod: CPTII,S$GLB,, | Performed by: FAMILY MEDICINE

## 2023-12-26 PROCEDURE — 1159F PR MEDICATION LIST DOCUMENTED IN MEDICAL RECORD: ICD-10-PCS | Mod: CPTII,S$GLB,, | Performed by: FAMILY MEDICINE

## 2023-12-26 PROCEDURE — 3078F DIAST BP <80 MM HG: CPT | Mod: CPTII,S$GLB,, | Performed by: FAMILY MEDICINE

## 2023-12-26 PROCEDURE — 3074F SYST BP LT 130 MM HG: CPT | Mod: CPTII,S$GLB,, | Performed by: FAMILY MEDICINE

## 2023-12-26 PROCEDURE — 1160F RVW MEDS BY RX/DR IN RCRD: CPT | Mod: CPTII,S$GLB,, | Performed by: FAMILY MEDICINE

## 2023-12-26 PROCEDURE — 4010F PR ACE/ARB THEARPY RXD/TAKEN: ICD-10-PCS | Mod: CPTII,S$GLB,, | Performed by: FAMILY MEDICINE

## 2023-12-26 PROCEDURE — 99999 PR PBB SHADOW E&M-EST. PATIENT-LVL V: ICD-10-PCS | Mod: PBBFAC,,, | Performed by: FAMILY MEDICINE

## 2023-12-26 PROCEDURE — 3074F PR MOST RECENT SYSTOLIC BLOOD PRESSURE < 130 MM HG: ICD-10-PCS | Mod: CPTII,S$GLB,, | Performed by: FAMILY MEDICINE

## 2023-12-26 PROCEDURE — 1160F PR REVIEW ALL MEDS BY PRESCRIBER/CLIN PHARMACIST DOCUMENTED: ICD-10-PCS | Mod: CPTII,S$GLB,, | Performed by: FAMILY MEDICINE

## 2023-12-26 PROCEDURE — 1101F PT FALLS ASSESS-DOCD LE1/YR: CPT | Mod: CPTII,S$GLB,, | Performed by: FAMILY MEDICINE

## 2023-12-26 PROCEDURE — 3008F PR BODY MASS INDEX (BMI) DOCUMENTED: ICD-10-PCS | Mod: CPTII,S$GLB,, | Performed by: FAMILY MEDICINE

## 2023-12-26 PROCEDURE — 99214 PR OFFICE/OUTPT VISIT, EST, LEVL IV, 30-39 MIN: ICD-10-PCS | Mod: S$GLB,,, | Performed by: FAMILY MEDICINE

## 2023-12-26 PROCEDURE — 1159F MED LIST DOCD IN RCRD: CPT | Mod: CPTII,S$GLB,, | Performed by: FAMILY MEDICINE

## 2023-12-26 PROCEDURE — 3288F FALL RISK ASSESSMENT DOCD: CPT | Mod: CPTII,S$GLB,, | Performed by: FAMILY MEDICINE

## 2023-12-26 PROCEDURE — 3044F PR MOST RECENT HEMOGLOBIN A1C LEVEL <7.0%: ICD-10-PCS | Mod: CPTII,S$GLB,, | Performed by: FAMILY MEDICINE

## 2023-12-26 PROCEDURE — 99999 PR PBB SHADOW E&M-EST. PATIENT-LVL V: CPT | Mod: PBBFAC,,, | Performed by: FAMILY MEDICINE

## 2023-12-26 RX ORDER — TAMSULOSIN HYDROCHLORIDE 0.4 MG/1
0.4 CAPSULE ORAL NIGHTLY
Qty: 90 CAPSULE | Refills: 3 | Status: SHIPPED | OUTPATIENT
Start: 2023-12-26 | End: 2024-12-25

## 2023-12-26 NOTE — PROGRESS NOTES
Subjective:       Patient ID: Parth Juarez is a 63 y.o. male.    Chief Complaint: Follow-up (6 month follow up)    HPI  Review of Systems   Constitutional:  Negative for fatigue and unexpected weight change.   Respiratory:  Negative for chest tightness and shortness of breath.    Cardiovascular:  Negative for chest pain, palpitations and leg swelling.   Gastrointestinal:  Negative for abdominal pain.   Musculoskeletal:  Negative for arthralgias.   Neurological:  Negative for dizziness, syncope, light-headedness and headaches.       Patient Active Problem List   Diagnosis    Hyperlipidemia    Good hypertension control    Exercise-induced asthma    Positive for microalbuminuria    Hyperglycemia    Anemia    NICCI (iron deficiency anemia)    ADHD (attention deficit hyperactivity disorder), combined type    Anxiety    BPH with elevated PSA    Chronic low back pain without sciatica     Patient is here for a chronic conditions follow up.   Reviewed labs 12/2023   C/o mild jeremias hearing loss     Pulm Dr. Mac ELLIOT  on CPAP now.  Using consistently. Feels better rested and less likely to fall asleep during day     A1c 5.4-diet controlled mild glucose intolerance  Lipids  at goal   urine ma elevated -on ARB   Eye dr. Dawn 3/8/23      Urology Dr. Gibbs/now Herbie  monitoring for mild LUTS due to BPH .  Has occ urgency and mild urge incontinence. Feels he empties well. Urinates once at night. Nl stream. Nl hesitancy. PSA mildly elevated, 6/23 5.6  .  Now s/p biopsy 3/22/22- 1 specimens - all benign  With some chronic inflammation. Mri prostate 7/23 Severely enlarged prostate gland with features of benign prostatic hyperplasia.  No suspicious focal lesion.         HTN- BP has been low 110s/60s. HCTZ now stopped. BP running < 130/80 consistently on digital htn 20% but < 140/90 100% of time  Objective:      Physical Exam  Vitals and nursing note reviewed.   Constitutional:       Appearance: He is well-developed.   Cardiovascular:       Rate and Rhythm: Normal rate and regular rhythm.      Heart sounds: Normal heart sounds.   Pulmonary:      Effort: Pulmonary effort is normal.      Breath sounds: Normal breath sounds.   Skin:     General: Skin is warm and dry.   Neurological:      Mental Status: He is alert and oriented to person, place, and time.         Assessment:       1. Hyperlipidemia, unspecified hyperlipidemia type    2. Hyperglycemia    3. ELLIOT on CPAP    4. BPH with elevated PSA    5. Hypertension, unspecified type    6. Iron deficiency anemia, unspecified iron deficiency anemia type    7. Positive for microalbuminuria    8. Prostate cancer screening    9. Hearing loss, unspecified hearing loss type, unspecified laterality        Plan:     1. Hyperlipidemia, unspecified hyperlipidemia type  Controlled on current medications.  Continue current medications.    - CBC Auto Differential; Future  - Comprehensive Metabolic Panel; Future  - Lipid Panel; Future    2. Hyperglycemia   Your blood sugar is borderline high.  This means you are at risk for developing type 2 diabetes mellitus.  To lessen your risk you should exercise regularly, avoid excess carbohydrates and work toward a body mass index of less than 25.      - CBC Auto Differential; Future  - Hemoglobin A1C; Future    3. ELLIOT on CPAP  Cont cpap consistently    4. BPH with elevated PSA  Cont current mgmt and monitoring  - tamsulosin (FLOMAX) 0.4 mg Cap; Take 1 capsule (0.4 mg total) by mouth every evening.  Dispense: 90 capsule; Refill: 3    5. Hypertension, unspecified type  Controlled on current medications.  Continue current medications.      6. Iron deficiency anemia, unspecified iron deficiency anemia type  Screen and treat as indicated:    - CBC Auto Differential; Future  - Ferritin; Future  - Iron and TIBC; Future    7. Positive for microalbuminuria  Cont monitoring and cont ARB  - Microalbumin/Creatinine Ratio, Urine; Future    8. Prostate cancer screening  Discussed benefits,  risks and limitations of PSA testing and current USPSTF guidelines.  Patient expressed verbal understanding and wished to pursue screening.    - PSA, TOTAL AND FREE; Future    9. Hearing loss, unspecified hearing loss type, unspecified laterality  Refer for eval and treat  - Ambulatory referral/consult to Audiology; Future  - Ambulatory referral/consult to ENT; Future      Time spent with patient: 20 minutes    Patient with be reevaluated in 6 months or sooner prn    Greater than 50% of this visit was spent counseling as described in above documentation:Yes

## 2023-12-27 ENCOUNTER — OFFICE VISIT (OUTPATIENT)
Dept: FAMILY MEDICINE | Facility: CLINIC | Age: 63
End: 2023-12-27
Payer: COMMERCIAL

## 2023-12-27 ENCOUNTER — PATIENT MESSAGE (OUTPATIENT)
Dept: FAMILY MEDICINE | Facility: CLINIC | Age: 63
End: 2023-12-27

## 2023-12-27 VITALS
OXYGEN SATURATION: 98 % | HEART RATE: 72 BPM | DIASTOLIC BLOOD PRESSURE: 78 MMHG | TEMPERATURE: 98 F | SYSTOLIC BLOOD PRESSURE: 129 MMHG

## 2023-12-27 DIAGNOSIS — J34.89 RHINORRHEA: ICD-10-CM

## 2023-12-27 DIAGNOSIS — R68.83 CHILLS: Primary | ICD-10-CM

## 2023-12-27 LAB
CTP QC/QA: YES
CTP QC/QA: YES
POC MOLECULAR INFLUENZA A AGN: NEGATIVE
POC MOLECULAR INFLUENZA B AGN: NEGATIVE
SARS-COV-2 RDRP RESP QL NAA+PROBE: NEGATIVE

## 2023-12-27 PROCEDURE — 4010F ACE/ARB THERAPY RXD/TAKEN: CPT | Mod: CPTII,S$GLB,, | Performed by: NURSE PRACTITIONER

## 2023-12-27 PROCEDURE — 4010F PR ACE/ARB THEARPY RXD/TAKEN: ICD-10-PCS | Mod: CPTII,S$GLB,, | Performed by: NURSE PRACTITIONER

## 2023-12-27 PROCEDURE — 3078F DIAST BP <80 MM HG: CPT | Mod: CPTII,S$GLB,, | Performed by: NURSE PRACTITIONER

## 2023-12-27 PROCEDURE — 99213 OFFICE O/P EST LOW 20 MIN: CPT | Mod: S$GLB,,, | Performed by: NURSE PRACTITIONER

## 2023-12-27 PROCEDURE — 99999 PR PBB SHADOW E&M-EST. PATIENT-LVL II: CPT | Mod: PBBFAC,,, | Performed by: NURSE PRACTITIONER

## 2023-12-27 PROCEDURE — 3074F SYST BP LT 130 MM HG: CPT | Mod: CPTII,S$GLB,, | Performed by: NURSE PRACTITIONER

## 2023-12-27 PROCEDURE — 99213 PR OFFICE/OUTPT VISIT, EST, LEVL III, 20-29 MIN: ICD-10-PCS | Mod: S$GLB,,, | Performed by: NURSE PRACTITIONER

## 2023-12-27 PROCEDURE — 3044F HG A1C LEVEL LT 7.0%: CPT | Mod: CPTII,S$GLB,, | Performed by: NURSE PRACTITIONER

## 2023-12-27 PROCEDURE — 3078F PR MOST RECENT DIASTOLIC BLOOD PRESSURE < 80 MM HG: ICD-10-PCS | Mod: CPTII,S$GLB,, | Performed by: NURSE PRACTITIONER

## 2023-12-27 PROCEDURE — 99999 PR PBB SHADOW E&M-EST. PATIENT-LVL II: ICD-10-PCS | Mod: PBBFAC,,, | Performed by: NURSE PRACTITIONER

## 2023-12-27 PROCEDURE — 3074F PR MOST RECENT SYSTOLIC BLOOD PRESSURE < 130 MM HG: ICD-10-PCS | Mod: CPTII,S$GLB,, | Performed by: NURSE PRACTITIONER

## 2023-12-27 PROCEDURE — 87635: ICD-10-PCS | Mod: QW,S$GLB,, | Performed by: NURSE PRACTITIONER

## 2023-12-27 PROCEDURE — 87502 INFLUENZA DNA AMP PROBE: CPT | Mod: QW,S$GLB,, | Performed by: NURSE PRACTITIONER

## 2023-12-27 PROCEDURE — 3044F PR MOST RECENT HEMOGLOBIN A1C LEVEL <7.0%: ICD-10-PCS | Mod: CPTII,S$GLB,, | Performed by: NURSE PRACTITIONER

## 2023-12-27 PROCEDURE — 87635 SARS-COV-2 COVID-19 AMP PRB: CPT | Mod: QW,S$GLB,, | Performed by: NURSE PRACTITIONER

## 2023-12-27 PROCEDURE — 87502 POCT INFLUENZA A/B MOLECULAR: ICD-10-PCS | Mod: QW,S$GLB,, | Performed by: NURSE PRACTITIONER

## 2023-12-27 NOTE — PROGRESS NOTES
This dictation has been generated using Modal Fluency Dictation some phonetic errors may occur. Please contact author for clarification if needed.     Problem List Items Addressed This Visit    None  Visit Diagnoses       Chills    -  Primary    Relevant Orders    POCT Influenza A/B Molecular (Completed)    POCT COVID-19 Rapid Screening (Completed)    Rhinorrhea        Relevant Orders    POCT Influenza A/B Molecular (Completed)    POCT COVID-19 Rapid Screening (Completed)            Orders Placed This Encounter    POCT Influenza A/B Molecular    POCT COVID-19 Rapid Screening         Chills and rhinorrhea. R/o flu and covid. Negative flu/covid.      No follow-ups on file.    ________________________________________________________________  ________________________________________________________________      No chief complaint on file.    History of present illness  This 63 y.o. presents today for complaint of chills and runny nose.  No coughing.  Denies any nausea vomiting diarrhea.  Onset of symptoms 36 hours ago.  Noted feeling a chills 2 nights ago.    Limited review of systems negative no sore throat.  No chest pain or shortness a breath no coughing   No nausea vomiting diarrhea   No  symptoms    Past Medical History:   Diagnosis Date    ADD (attention deficit disorder)     Asthma     exercise induced    BPH (benign prostatic hyperplasia)     Elevated PSA     Hyperlipidemia     Hypertension        Past Surgical History:   Procedure Laterality Date    BREAST BIOPSY      COLONOSCOPY  [id number]    COLONOSCOPY N/A 4/11/2018    Procedure: COLONOSCOPY;  Surgeon: Vikram Medrano MD;  Location: [facility];  Service: Endoscopy;  Laterality: N/A;    CYSTOSCOPY N/A 3/22/2022    Procedure: CYSTOSCOPY;  Surgeon: Corey Stoner MD;  Location: [facility] OR;  Service: Urology;  Laterality: N/A;    PTERYGIUM EXCISION W/ GRAFT      bilateral    removal of colon polyps      TRANSRECTAL BIOPSY OF PROSTATE WITH ULTRASOUND GUIDANCE  N/A 3/22/2022    Procedure: BIOPSY, PROSTATE, RECTAL APPROACH, WITH US GUIDANCE;  Surgeon: Corey Stoner MD;  Location: [facility] OR;  Service: Urology;  Laterality: N/A;       Family History   Problem Relation Age of Onset    Cancer Mother 82        lung, nonsmoker, small cell ca    Cancer Father         bladder    Arthritis Father     Cancer Maternal Uncle         liver    Glaucoma Neg Hx     Macular degeneration Neg Hx     Retinal detachment Neg Hx        Social History     Socioeconomic History    Marital status:    Tobacco Use    Smoking status: Never     Passive exposure: Never    Smokeless tobacco: Never   Substance and Sexual Activity    Alcohol use: Yes     Comment: rare    Drug use: No    Sexual activity: Yes     Partners: Female     Social Determinants of Health     Financial Resource Strain: Low Risk  (12/25/2023)    Overall Financial Resource Strain (CARDIA)     Difficulty of Paying Living Expenses: Not hard at all   Food Insecurity: No Food Insecurity (12/25/2023)    Hunger Vital Sign     Worried About Running Out of Food in the Last Year: Never true     Ran Out of Food in the Last Year: Never true   Transportation Needs: No Transportation Needs (12/25/2023)    PRAPARE - Transportation     Lack of Transportation (Medical): No     Lack of Transportation (Non-Medical): No   Physical Activity: Sufficiently Active (12/25/2023)    Exercise Vital Sign     Days of Exercise per Week: 2 days     Minutes of Exercise per Session: 120 min   Stress: Stress Concern Present (12/25/2023)    [place] [employer] - Occupational Stress Questionnaire     Feeling of Stress : To some extent   Social Connections: Unknown (12/25/2023)    Social Connection and Isolation Panel [NHANES]     Frequency of Communication with Friends and Family: More than three times a week     Frequency of Social Gatherings with Friends and Family: Once a week     Active Member of Clubs or Organizations: Yes     Attends  Club or Organization Meetings: 1 to 4 times per year     Marital Status:    Housing Stability: Low Risk  (12/25/2023)    Housing Stability Vital Sign     Unable to Pay for Housing in the Last Year: No     Number of Places Lived in the Last Year: 1     Unstable Housing in the Last Year: No       Current Outpatient Medications   Medication Sig Dispense Refill    ascorbic acid, vitamin C, (VITAMIN C) 500 MG tablet Take 500 mg by mouth once daily.      atorvastatin (LIPITOR) 40 MG tablet Take 1 tablet (40 mg total) by mouth once daily. 90 tablet 3    buPROPion (WELLBUTRIN XL) 150 MG TB24 tablet TAKE 1 TABLET BY MOUTH ONCE DAILY IN THE MORNING 90 tablet 3    citalopram (CELEXA) 20 MG tablet Take 1 tablet (20 mg total) by mouth once daily. 90 tablet 3    fluticasone propionate (FLONASE) 50 mcg/actuation nasal spray 1 spray by Each Nare route daily as needed for Rhinitis.      FOLIC ACID/MULTIVIT-MIN/LUTEIN (CENTRUM SILVER ORAL) Take 1 tablet by mouth once daily.      inhalation device (AEROCHAMBER PLUS FLOW-VU) Use as directed for inhalation. 1 Device 0    losartan (COZAAR) 100 MG tablet Take 1 tablet (100 mg total) by mouth once daily. 90 tablet 3    OMEGA-3S/DHA/EPA/FISH OIL (OMEGA 3 ORAL) Take 4 capsules by mouth once daily.       tamsulosin (FLOMAX) 0.4 mg Cap Take 1 capsule (0.4 mg total) by mouth every evening. 90 capsule 3     No current facility-administered medications for this visit.       Review of patient's allergies indicates:   Allergen Reactions    No known drug allergies        Physical examination  Vitals Reviewed  /78 (BP Location: Right arm, Patient Position: Sitting, BP Method: Medium (Manual))   Pulse 72   Temp 98.2 °F (36.8 °C) (Oral)   SpO2 98%  There is no height or weight on file to calculate BMI.     BP Readings from Last 3 Encounters:   12/27/23 129/78   12/26/23 128/70   07/17/23 131/83       Wt Readings from Last 3 Encounters:   12/26/23 84.6 kg (186 lb 8.2 oz)   07/17/23 85 kg  (187 lb 6.3 oz)   06/21/23 86 kg (189 lb 9.5 oz)     Gen. Well-dressed well-nourished   Skin warm dry and intact.  No rashes noted.  HEENT.    Nares patent bilateral.  Pharynx is unremarkable except postnasal drip.      Chest.  Respirations are even unlabored.  Lungs are clear to auscultation.  Cardiac regular rate and rhythm.  No chest wall adenopathy noted.  Neuro. Awake alert oriented x4.  Normal judgment and cognition noted.  Extremities no clubbing or cyanosis noted.     Call or return to clinic prn if these symptoms worsen or fail to improve as anticipated.

## 2024-02-13 DIAGNOSIS — E78.5 HYPERLIPIDEMIA, UNSPECIFIED HYPERLIPIDEMIA TYPE: ICD-10-CM

## 2024-02-14 RX ORDER — LOSARTAN POTASSIUM 100 MG/1
100 TABLET ORAL
Qty: 90 TABLET | Refills: 3 | Status: SHIPPED | OUTPATIENT
Start: 2024-02-14

## 2024-02-14 RX ORDER — ATORVASTATIN CALCIUM 40 MG/1
40 TABLET, FILM COATED ORAL
Qty: 90 TABLET | Refills: 3 | Status: SHIPPED | OUTPATIENT
Start: 2024-02-14

## 2024-02-15 NOTE — TELEPHONE ENCOUNTER
Refill Decision Note   Parth Juarez  is requesting a refill authorization.  Brief Assessment and Rationale for Refill:  Approve     Medication Therapy Plan:       Medication Reconciliation Completed: No   Comments:     No Care Gaps recommended.     Note composed:8:44 PM 02/14/2024

## 2024-02-21 ENCOUNTER — CLINICAL SUPPORT (OUTPATIENT)
Dept: AUDIOLOGY | Facility: CLINIC | Age: 64
End: 2024-02-21
Payer: COMMERCIAL

## 2024-02-21 ENCOUNTER — OFFICE VISIT (OUTPATIENT)
Dept: OTOLARYNGOLOGY | Facility: CLINIC | Age: 64
End: 2024-02-21
Payer: COMMERCIAL

## 2024-02-21 VITALS
BODY MASS INDEX: 28.98 KG/M2 | SYSTOLIC BLOOD PRESSURE: 114 MMHG | HEART RATE: 67 BPM | WEIGHT: 185 LBS | DIASTOLIC BLOOD PRESSURE: 65 MMHG

## 2024-02-21 DIAGNOSIS — H91.93 HIGH FREQUENCY HEARING LOSS OF BOTH EARS: Primary | ICD-10-CM

## 2024-02-21 DIAGNOSIS — H61.23 EXCESSIVE CERUMEN IN EAR CANAL, BILATERAL: Primary | ICD-10-CM

## 2024-02-21 DIAGNOSIS — H91.90 HEARING LOSS, UNSPECIFIED HEARING LOSS TYPE, UNSPECIFIED LATERALITY: ICD-10-CM

## 2024-02-21 PROCEDURE — 3078F DIAST BP <80 MM HG: CPT | Mod: CPTII,S$GLB,, | Performed by: OTOLARYNGOLOGY

## 2024-02-21 PROCEDURE — 3074F SYST BP LT 130 MM HG: CPT | Mod: CPTII,S$GLB,, | Performed by: OTOLARYNGOLOGY

## 2024-02-21 PROCEDURE — 4010F ACE/ARB THERAPY RXD/TAKEN: CPT | Mod: CPTII,S$GLB,, | Performed by: OTOLARYNGOLOGY

## 2024-02-21 PROCEDURE — 1159F MED LIST DOCD IN RCRD: CPT | Mod: CPTII,S$GLB,, | Performed by: OTOLARYNGOLOGY

## 2024-02-21 PROCEDURE — 99999 PR PBB SHADOW E&M-EST. PATIENT-LVL IV: CPT | Mod: PBBFAC,,, | Performed by: OTOLARYNGOLOGY

## 2024-02-21 PROCEDURE — 1101F PT FALLS ASSESS-DOCD LE1/YR: CPT | Mod: CPTII,S$GLB,, | Performed by: OTOLARYNGOLOGY

## 2024-02-21 PROCEDURE — 92567 TYMPANOMETRY: CPT | Mod: S$GLB,,, | Performed by: AUDIOLOGIST

## 2024-02-21 PROCEDURE — 99203 OFFICE O/P NEW LOW 30 MIN: CPT | Mod: S$GLB,,, | Performed by: OTOLARYNGOLOGY

## 2024-02-21 PROCEDURE — 3008F BODY MASS INDEX DOCD: CPT | Mod: CPTII,S$GLB,, | Performed by: OTOLARYNGOLOGY

## 2024-02-21 PROCEDURE — 92557 COMPREHENSIVE HEARING TEST: CPT | Mod: S$GLB,,, | Performed by: AUDIOLOGIST

## 2024-02-21 PROCEDURE — 1126F AMNT PAIN NOTED NONE PRSNT: CPT | Mod: CPTII,S$GLB,, | Performed by: OTOLARYNGOLOGY

## 2024-02-21 PROCEDURE — 99999 PR PBB SHADOW E&M-EST. PATIENT-LVL II: CPT | Mod: PBBFAC,,, | Performed by: AUDIOLOGIST

## 2024-02-21 PROCEDURE — 3288F FALL RISK ASSESSMENT DOCD: CPT | Mod: CPTII,S$GLB,, | Performed by: OTOLARYNGOLOGY

## 2024-02-21 NOTE — PROGRESS NOTES
Parth Juarez was seen 02/21/2024 for an audiological evaluation. Pt was alone during today's visit. Pertinent complaints today include hearing loss. Pt denies history of loud noise exposure and denies early onset of genetic family history of hearing loss. Otoscopy revealed some cerumen in both ears. The tympanic membrane was visualized AU prior to proceeding with the hearing testing.     Results reveal a mild  high frequency  hearing loss for the right ear and  moderate  high frequency  hearing loss for the left ear.    Speech Reception Thresholds were  20 dBHL for the right ear and 20 dBHL for the left ear.    Word recognition scores were excellent for the right ear and excellent for the left ear.   Tympanograms were Type A for the right ear and Type A for the left ear.    Recommendations: 1) Follow up with ENT     2) Annual hearing evaluation     3) Wear hearing protection around noise    Audiogram results were reviewed in detail with patient and all questions were answered. Results will be reviewed by the referring provider at the completion of this note. All complaints were addressed during this visit to the patient's satisfaction.

## 2024-02-21 NOTE — PROGRESS NOTES
[facility]    Subjective:      Patient: Parth Juarez Patient PCP: Betty Styles MD         :  1960     Sex:  male      MRN:  [id number]          Date of Visit: 2024      Chief Complaint: Hearing Loss (For about 1 year has some hearing loss thought in bilateral ears ; Did Audiogram today worse in left than right ; )      Patient ID: Parth Juarez is a 63 y.o. male     Patient is a  lifelong NON-smoker with a past medical history of anxiety/ADHD, asthma, HLD, well-controlled hypertension seen today for 1 year of hearing loss believed to be symmetric/bilateral.  No acute onset.  No associated burn go fullness pressure or pain.  No pulsatile or asymmetric tinnitus.  Seen today  referred to me by Dr. Betty Styles in consultation    Audiogram today shows normal sloping to borderline hearing through 4000 hertz with a left-greater-than-right 6-8000 hertz loss which is mild on the right and mild-to-moderate on the left.  Normal tympanometry.  Symmetric normal 20 dB SRT and 100% discrimination.  He says his wife reports some difficulty with him hearing her.  He reports this is the case some more difficult listening environments such as background noise.        Labs:  WBC   Date Value Ref Range Status   2023 3.91 3.90 - 12.70 K/uL Final     Hemoglobin   Date Value Ref Range Status   2023 14.2 14.0 - 18.0 g/dL Final     Platelets   Date Value Ref Range Status   2023 207 150 - 450 K/uL Final     Creatinine   Date Value Ref Range Status   2023 1.1 0.5 - 1.4 mg/dL Final     TSH   Date Value Ref Range Status   2022 1.491 0.400 - 4.000 uIU/mL Final     Hemoglobin A1C   Date Value Ref Range Status   2023 5.4 4.0 - 5.6 % Final     Comment:     ADA Screening Guidelines:  5.7-6.4%  Consistent with prediabetes  >or=6.5%  Consistent with diabetes    High levels of fetal hemoglobin interfere with the HbA1C  assay. Heterozygous hemoglobin variants (HbS, HgC, etc)do  not significantly  interfere with this assay.   However, presence of multiple variants may affect accuracy.         Past Medical History  He has a past medical history of ADD (attention deficit disorder), Asthma, BPH (benign prostatic hyperplasia), Elevated PSA, Hyperlipidemia, and Hypertension.    Family / Surgical / Social History  His family history includes Arthritis in his father; Cancer in his father and maternal uncle; Cancer (age of onset: 82) in his mother.    Past Surgical History:   Procedure Laterality Date    BREAST BIOPSY      COLONOSCOPY  [id number]    COLONOSCOPY N/A 4/11/2018    Procedure: COLONOSCOPY;  Surgeon: Vikram Medrano MD;  Location: [facility];  Service: Endoscopy;  Laterality: N/A;    CYSTOSCOPY N/A 3/22/2022    Procedure: CYSTOSCOPY;  Surgeon: Corey Stoner MD;  Location: [facility] OR;  Service: Urology;  Laterality: N/A;    PTERYGIUM EXCISION W/ GRAFT      bilateral    removal of colon polyps      TRANSRECTAL BIOPSY OF PROSTATE WITH ULTRASOUND GUIDANCE N/A 3/22/2022    Procedure: BIOPSY, PROSTATE, RECTAL APPROACH, WITH US GUIDANCE;  Surgeon: Corey Stoner MD;  Location: [facility] OR;  Service: Urology;  Laterality: N/A;       Social History     Tobacco Use    Smoking status: Never     Passive exposure: Never    Smokeless tobacco: Never   Substance and Sexual Activity    Alcohol use: Yes     Comment: rare    Drug use: No    Sexual activity: Yes     Partners: Female       Medications  He has a current medication list which includes the following prescription(s): ascorbic acid (vitamin c), atorvastatin, bupropion, citalopram, boostrix tdap, fluticasone propionate, folic acid/multivit-min/lutein, inhalation spacing device, losartan, omega-3s/dha/epa/fish oil, and tamsulosin.      Allergies  Review of patient's allergies indicates:   Allergen Reactions    No known drug allergies        All medications, allergies, and past history have been reviewed.    Objective:      Vitals:      12/26/2023     1:45 PM  "12/27/2023     8:31 AM 2/21/2024     2:11 PM   Vitals - 1 value per visit   SYSTOLIC 128 129 114   DIASTOLIC 70 78 65   Pulse 71 72 67   Temp 98.7 °F (37.1 °C) 98.2 °F (36.8 °C)    SPO2 97 % 98 %    Weight (lb) 186.51  185   Weight (kg) 84.6  83.915   Height 5' 7" (1.702 m)     BMI (Calculated) 29.2     Pain Score Zero  Zero       Body surface area is 1.99 meters squared.    Physical Exam:    GENERAL  APPEARANCE -  alert, appears stated age, and cooperative  BARRIER(S) TO COMMUNICATION -  none VOICE - appropriate for age and gender    INTEGUMENTARY  no suspicious head and neck lesions    HEENT  HEAD: Normocephalic, without obvious abnormality, atraumatic  FACE: INSPECTION - Symmetric, no signs of trauma, no suspicious lesion(s)      STRENGTH - facial symmetry intact     EYES  Normal occular alignment and mobility with no visible nystagmus at rest    EARS/NOSE/MOUTH/THROAT  EARS  PINNAE AND EXTERNAL EARS - no suspicious lesion OTOSCOPIC EXAM (surgical microscopy was not used for visualization/instrumentation): EAR EXAM - Excess wax curetted clear to normal ears  HEARING - grossly intact to voice/finger rub    CHEST AND LUNG   INSPECTION & AUSCULTATION - normal effort, no stridor    NEUROLOGIC  MENTAL STATUS - alert, interactive CRANIAL NERVES - intact grossly        Procedure(s):  None          Assessment:      Problem List Items Addressed This Visit    None  Visit Diagnoses       Excessive cerumen in ear canal, bilateral    -  Primary    Hearing loss, unspecified hearing loss type, unspecified laterality                     Plan:      Some high-frequency hearing loss but excellent audiometry in total.  Normal speech audiometry no suspicious pattern.  We discussed age-related auditory dysfunction as a likely cause for difficulty hearing in certain listening environments.  No indication for amplification/hearing aids at this time.    Follow up as needed          Voice recognition software was used in the creation of " this note/communication and any sound-alike errors which may have occurred from its use should be taken in context when interpreting.  If such errors prevent a clear understanding of the note/communication, please contact the office for clarification.

## 2024-03-12 ENCOUNTER — TELEPHONE (OUTPATIENT)
Dept: FAMILY MEDICINE | Facility: CLINIC | Age: 64
End: 2024-03-12

## 2024-03-12 ENCOUNTER — CLINICAL SUPPORT (OUTPATIENT)
Dept: FAMILY MEDICINE | Facility: CLINIC | Age: 64
End: 2024-03-12
Payer: COMMERCIAL

## 2024-03-12 DIAGNOSIS — R05.9 COUGH, UNSPECIFIED TYPE: Primary | ICD-10-CM

## 2024-03-12 DIAGNOSIS — R05.9 COUGH, UNSPECIFIED TYPE: ICD-10-CM

## 2024-03-12 PROCEDURE — 87502 INFLUENZA DNA AMP PROBE: CPT | Mod: QW,S$GLB,, | Performed by: PHYSICIAN ASSISTANT

## 2024-03-12 PROCEDURE — 99499 UNLISTED E&M SERVICE: CPT | Mod: S$GLB,,, | Performed by: PHYSICIAN ASSISTANT

## 2024-03-12 PROCEDURE — 87635 SARS-COV-2 COVID-19 AMP PRB: CPT | Mod: QW,S$GLB,, | Performed by: PHYSICIAN ASSISTANT

## 2024-03-12 NOTE — PROGRESS NOTES
Verified pt name & . POCT Flu & Covid-19 performed per orders.   Pt contacted via telephone with results.

## 2024-05-29 ENCOUNTER — OFFICE VISIT (OUTPATIENT)
Dept: OPTOMETRY | Facility: CLINIC | Age: 64
End: 2024-05-29
Payer: COMMERCIAL

## 2024-05-29 DIAGNOSIS — H25.13 NUCLEAR SCLEROSIS OF BOTH EYES: ICD-10-CM

## 2024-05-29 DIAGNOSIS — H11.001 PTERYGIUM OF RIGHT EYE: ICD-10-CM

## 2024-05-29 DIAGNOSIS — Z01.00 EXAMINATION OF EYES AND VISION: Primary | ICD-10-CM

## 2024-05-29 DIAGNOSIS — H18.513 CORNEAL GUTTATA OF BOTH EYES: ICD-10-CM

## 2024-05-29 DIAGNOSIS — H52.7 REFRACTIVE ERROR: ICD-10-CM

## 2024-05-29 PROCEDURE — 92014 COMPRE OPH EXAM EST PT 1/>: CPT | Mod: S$GLB,,, | Performed by: OPTOMETRIST

## 2024-05-29 PROCEDURE — 92015 DETERMINE REFRACTIVE STATE: CPT | Mod: S$GLB,,, | Performed by: OPTOMETRIST

## 2024-05-29 PROCEDURE — 99999 PR PBB SHADOW E&M-EST. PATIENT-LVL III: CPT | Mod: PBBFAC,,, | Performed by: OPTOMETRIST

## 2024-05-29 NOTE — PROGRESS NOTES
HPI     Annual Exam     Additional comments: LDE: 03/08/2023           Comments    63 YO male presents today for an annual eye exam.     Patient states that he is having trouble with near vision not being as   sharp as it was.     Wears glasses all the time, from 2023.           Last edited by Agusto Dawn, OD on 5/29/2024  2:59 PM.            Assessment /Plan     For exam results, see Encounter Report.    Examination of eyes and vision    Nuclear sclerosis of both eyes    Refractive error    Pterygium of right eye    Corneal guttata of both eyes      1. Examination of eyes and vision    2. Nuclear sclerosis of both eyes  Mild to moderate OU, not visually significant  Discussed possible ocular affects of cataracts. Acceptable BCVA OU.   Discussed treatment options. Surgery not recommended at this time.   Monitor yearly.     3. Refractive error  Dispensed updated spectacle Rx. Discussed various spectacle lens options. Discussed adaptation period to new specs.     4. Pterygium of right eye  S/p excision -- 1 mm inferior nasal OD, not visually significant.  Monitor yearly.     5. Corneal guttata of both eyes  Mild non-central corneal guttata OU  Not visually significant  Observe

## 2024-06-20 NOTE — PLAN OF CARE
Have you had a colonoscopy LESS THAN 3 years ago? No    * If YES, answer these questions*:    1. Did patient have a prior colonic polyp in a previous surveillance/diagnostic colonoscopy and is 18 years or older on date of encounter?    2. Documentation of < 3 year interval since the patients last colonoscopy due to medical reasons (eg., last colonoscopy incomplete, last colonoscopy had inadequate prep, piecemeal removal of adenomas, or last colonoscopy found > 10 adenomas) ?    86

## 2024-07-25 DIAGNOSIS — F41.9 ANXIETY: ICD-10-CM

## 2024-07-25 DIAGNOSIS — I10 HYPERTENSION, UNSPECIFIED TYPE: ICD-10-CM

## 2024-07-25 RX ORDER — BUPROPION HYDROCHLORIDE 150 MG/1
150 TABLET ORAL EVERY MORNING
Qty: 90 TABLET | Refills: 1 | Status: SHIPPED | OUTPATIENT
Start: 2024-07-25

## 2024-07-25 RX ORDER — CITALOPRAM 20 MG/1
20 TABLET, FILM COATED ORAL DAILY
Qty: 90 TABLET | Refills: 1 | Status: SHIPPED | OUTPATIENT
Start: 2024-07-25

## 2024-07-25 NOTE — TELEPHONE ENCOUNTER
Refill Decision Note   Parth Juarez  is requesting a refill authorization.  Brief Assessment and Rationale for Refill:  Approve     Medication Therapy Plan:         Comments:     Note composed:12:31 PM 07/25/2024

## 2024-07-25 NOTE — TELEPHONE ENCOUNTER
No care due was identified.  [facility] Embedded Care Due Messages. Reference number: [id number].   7/25/2024 5:55:48 AM CDT

## 2024-08-05 ENCOUNTER — PATIENT MESSAGE (OUTPATIENT)
Dept: ADMINISTRATIVE | Facility: OTHER | Age: 64
End: 2024-08-05
Payer: COMMERCIAL

## 2024-08-13 ENCOUNTER — LAB VISIT (OUTPATIENT)
Dept: LAB | Facility: HOSPITAL | Age: 64
End: 2024-08-13
Attending: NURSE PRACTITIONER
Payer: COMMERCIAL

## 2024-08-13 ENCOUNTER — OFFICE VISIT (OUTPATIENT)
Dept: UROLOGY | Facility: CLINIC | Age: 64
End: 2024-08-13
Payer: COMMERCIAL

## 2024-08-13 DIAGNOSIS — N40.0 BPH WITH ELEVATED PSA: ICD-10-CM

## 2024-08-13 DIAGNOSIS — R97.20 BPH WITH ELEVATED PSA: ICD-10-CM

## 2024-08-13 DIAGNOSIS — N40.0 BPH WITH ELEVATED PSA: Primary | ICD-10-CM

## 2024-08-13 DIAGNOSIS — R97.20 BPH WITH ELEVATED PSA: Primary | ICD-10-CM

## 2024-08-13 LAB
BILIRUBIN, UA POC OHS: NEGATIVE
BLOOD, UA POC OHS: NEGATIVE
CLARITY, UA POC OHS: CLEAR
COLOR, UA POC OHS: YELLOW
GLUCOSE, UA POC OHS: NEGATIVE
KETONES, UA POC OHS: NEGATIVE
LEUKOCYTES, UA POC OHS: NEGATIVE
NITRITE, UA POC OHS: NEGATIVE
PH, UA POC OHS: 5.5
POC RESIDUAL URINE VOLUME: 108 ML (ref 0–100)
PROTEIN, UA POC OHS: ABNORMAL
SPECIFIC GRAVITY, UA POC OHS: 1.01
UROBILINOGEN, UA POC OHS: 0.2

## 2024-08-13 PROCEDURE — 1159F MED LIST DOCD IN RCRD: CPT | Mod: CPTII,S$GLB,, | Performed by: NURSE PRACTITIONER

## 2024-08-13 PROCEDURE — 1160F RVW MEDS BY RX/DR IN RCRD: CPT | Mod: CPTII,S$GLB,, | Performed by: NURSE PRACTITIONER

## 2024-08-13 PROCEDURE — 51798 US URINE CAPACITY MEASURE: CPT | Mod: S$GLB,,, | Performed by: NURSE PRACTITIONER

## 2024-08-13 PROCEDURE — 84153 ASSAY OF PSA TOTAL: CPT | Performed by: NURSE PRACTITIONER

## 2024-08-13 PROCEDURE — 1101F PT FALLS ASSESS-DOCD LE1/YR: CPT | Mod: CPTII,S$GLB,, | Performed by: NURSE PRACTITIONER

## 2024-08-13 PROCEDURE — 84154 ASSAY OF PSA FREE: CPT | Performed by: NURSE PRACTITIONER

## 2024-08-13 PROCEDURE — 99214 OFFICE O/P EST MOD 30 MIN: CPT | Mod: S$GLB,,, | Performed by: NURSE PRACTITIONER

## 2024-08-13 PROCEDURE — 3288F FALL RISK ASSESSMENT DOCD: CPT | Mod: CPTII,S$GLB,, | Performed by: NURSE PRACTITIONER

## 2024-08-13 PROCEDURE — 4010F ACE/ARB THERAPY RXD/TAKEN: CPT | Mod: CPTII,S$GLB,, | Performed by: NURSE PRACTITIONER

## 2024-08-13 PROCEDURE — 36415 COLL VENOUS BLD VENIPUNCTURE: CPT | Mod: PO | Performed by: NURSE PRACTITIONER

## 2024-08-13 PROCEDURE — 81003 URINALYSIS AUTO W/O SCOPE: CPT | Mod: QW,S$GLB,, | Performed by: NURSE PRACTITIONER

## 2024-08-13 PROCEDURE — 99999 PR PBB SHADOW E&M-EST. PATIENT-LVL III: CPT | Mod: PBBFAC,,, | Performed by: NURSE PRACTITIONER

## 2024-08-13 RX ORDER — TAMSULOSIN HYDROCHLORIDE 0.4 MG/1
0.8 CAPSULE ORAL NIGHTLY
Qty: 180 CAPSULE | Refills: 3 | Status: SHIPPED | OUTPATIENT
Start: 2024-08-13 | End: 2025-08-13

## 2024-08-13 NOTE — PROGRESS NOTES
[facility] Note  Staff: DENI Daniels    PCP: NANDA Styles    Chief Complaint: BPH with elevated PSA levels    Subjective:        HPI: Parth Juarez is a 64 y.o. male presents today for f/up with hx of BPH, LUTS, and elevated PSA levels.    PMHx: HTN, HLD, NICCI, ADD. 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 No famHx CaP but father had bladder ca  When psa crispin on 3/20/16 to 4.3 he was placed on finasteride 5mg daily and repeat psa 10/11/16 was 2.8 (=5.6)  When he was seen again in 2018 by Dr Gibbs it was noted : Dr Styles put the patient on finasteride 5 mg daily with some improvement in LUTS and last PSA 3/21/2018 and was 4.4 and this was before he started taking finasteride. Repeat psa 9/19/18 was 3.1 (=6.2)  Last saw Dr Gibbs 2/2020 noting progressive LUTS with urgency, frequency, PV dribble though also on diuretic and NTF x0     On my evaluation last year, noted after diuretic more frequency/urgency. Better somewhat after decreasing dose from 25 to 12.5  Felt like wasn't emptying as well when starting wellbutrin. Has been on finasteride monotherapy only in past, but stopped it completely given contraindication to blood donation PSA in 2009 at biopsy was 3.7, age adjusted elevation. Early in AM does have some UUI. Even tried shield pad. Occ AMs if cant make it on time   Heavy coffee in AM, not much during day, no soft drink. Metamucil daily to prevent constipation bc was a problem  SANDY 35-40g benign. Started flomax with conservative recs for urgency frequency     Cysto/Prostate Biopsy 3/22/22 (of note took his flomax at 4am and  BP was 90s/50s on triage). PSA: 6.3, VOLUME: 83.9cc  U/S:  scattered punctate hypoechoities, no median lobe, normal SVs; CYSTO: significant kissing Lateral lobe obstruction centrally and distally, more anterolateral proximally though asymm R > L,  with minimal intravesical extension without median lobe. Mild scattered trabeculations   PATH: 14 cores BPH  On bx follow up noted that now on flomax, having stopped his diuretic, his BP is controlled and his urgency frequency have largely resolved. Decd caffeine some.   AUA SS 1/0, delighted (1 for frequency.)      11/2/22: AUASS: 4/0 (Urgency- 2; Weak Stream- 1; Sleeping- 1). PVR 0cc; Uroflow: voided 128cc in 16s with Qm 16.7cc/s, Qa 8.4cc/s, 2 intervals  Uroflow reviewed with nonobstructed pattern and efficient voiding curve     7/17/2023 OV with MD:    Reference Range  01/26/21 08:08 02/04/22 12:22 10/04/22 07:13 06/21/23 16:23   PSA, Screen 0.00 - 4.00  3.5 6.3 (H)       PSA Total 0.00 - 4.00      3.1 5.6 (H)   PSA, Free 0.00 - 1.50      1.11 2.04 (H)   PSA, Free % Not est %     35.81 36.43   Had recent follow up with Dr Styles 6/21/23 noting he is now on CPAP (Bubba) for his ELLIOT, BP had been low so HCTZ stopped and BP remained at goal, and noted monitoring for mild LUTS due to BPH .  Has occ urgency and mild urge incontinence. Feels he empties well. Urinates once at night. Nl stream. Nl hesitancy.   Noted last psa elevation and biopsy with bph/inflammation and PSA free/total repeated and forwarded as above  On review of PSA, got follow up MRI prostate prior to reeval  MRI prostate 7/12/23: 87g pirad2  6/14/23 HbA1c 5.3, Cr 1.1, eGFR >60  He reports AUA SS: 5/0, delighted.  Urgency has improved, increases with caffeine, which he has decreased.  Nocturia down to 0, and did at CPAP recently as above.  He has been exercising, and feels well.  PVR 14 cc by bladder scan.     MRI of the Prostate was completed on 7/12/2023:  IMPRESSION:  Severely enlarged prostate gland  "with features of benign prostatic hyperplasia.  No suspicious focal lesion.     Overall Assessment:  PIRADS 2 (clinically significant cancer is unlikely to be present)  Number of targets created for potential MR/US fusion biopsy  Peripheral zone: 0  Transition zone: 0    Per Dr. tSoner results overview  on   "Seems your very large prostate is still the most likely reason for your psa elevation given 87g volume, and free psa >30%."  Again reviewed that he was previously on finasteride monotherapy, with near doubling of prostate volume from  to present, and despite PSA elevation, given negative prostate biopsy, very normal free PSA greater than 30% which is most consistent with BPH, and negative/PI-rad 2 MRI, no concern for underlying malignancy, and attribute all to BPH at this time.     OV 24:  Pt presents today for annual follow-up:  UA upon arrival to clinic showed trace protein, 5.5, 1.015  PVR is 108 mL*  Pt denies dysuria and gross hematuria today.  Pt does admit to some incontinence  Currently takes Flomax 0.4 mg one capsule daily with no problems.  Was on Finasteride in the past but states today he stopped due to monitoring process with his elevated PSA levels in the past.  Father had hx bladder cancer.  Started CPAP machine less than one year ago and as a result pt has noticed improvement in LUTS since starting machine.    Last PSA Screenin23: 5.6;Free-2.04; %-36.43  Lab Results   Component Value Date    PSA 6.3 (H) 2022    PSA 3.5 2021    PSA 3.7 2020    PSADIAG 3.1 2018    PSADIAG 2.8 10/11/2016     AUA SS Today:   Pleased  Feeling of ICBE: 0  Frequency:4  Intermittency:1  Urgency:3  Weak urine stream:3  Strainin  Nocturia:0    REVIEW OF SYSTEMS:  A comprehensive 10 system review was performed and is negative except as noted above in HPI    PMHx:  Past Medical History:   Diagnosis Date    ADD (attention deficit disorder)     Asthma     exercise induced "    BPH (benign prostatic hyperplasia)     Elevated PSA     Hyperlipidemia     Hypertension      PSHx:  Past Surgical History:   Procedure Laterality Date    BREAST BIOPSY      COLONOSCOPY  [id number]    COLONOSCOPY N/A 4/11/2018    Procedure: COLONOSCOPY;  Surgeon: Vikram Medrano MD;  Location: [facility];  Service: Endoscopy;  Laterality: N/A;    CYSTOSCOPY N/A 3/22/2022    Procedure: CYSTOSCOPY;  Surgeon: Corey Stoner MD;  Location: [facility] OR;  Service: Urology;  Laterality: N/A;    PTERYGIUM EXCISION W/ GRAFT      bilateral    removal of colon polyps      TRANSRECTAL BIOPSY OF PROSTATE WITH ULTRASOUND GUIDANCE N/A 3/22/2022    Procedure: BIOPSY, PROSTATE, RECTAL APPROACH, WITH US GUIDANCE;  Surgeon: Corey Stoner MD;  Location: [facility];  Service: Urology;  Laterality: N/A;     Allergies:  No known drug allergies    Medications: reviewed     Objective:   There were no vitals filed for this visit.    General:WDWN in NAD  Eyes: PERRLA, normal conjunctiva  Respiratory: no increased work on breathing, clear to auscultation  Cardiovascular: regular rate and rhythm. No obvious extremity edema.  GI: palpation of masses. No tenderness. No hepatosplenomegaly to palpation.  Musculoskeletal: normal range of motion of bilateral upper extremities. Normal muscle strength and tone.  Skin: no obvious rashes or lesions. No tightening of skin noted.  Neurologic: CN grossly normal. Normal sensation.   Psychiatric: awake, alert and oriented x 3. Mood and affect normal. Cooperative.     Exam performed by me during OV today:  Inspection of anus and perineum normal  SANDY: 40g enlarged, smooth prostate gland without nodules, masses, tenderness. SV not palpable. Normal sphincter tone.   +Outer hemorrhoids observed on exam-non-inflammed.  Assessment:       1. BPH with elevated PSA          Plan:   BPH with LUTS; Elevated PSA Levels:     Suggested to pt to increase Flomax to 0.8 mg in the evening at this time.  The med prescribed  to pt today as trial to see if med improves pt's current LUTS.  Benefits, risks and side affects were thoroughly explained to pt today in office with all questions answered.    2.    PSA, Total and Free to be completed this week for annual recheck at this time.    3.    Reviewed with pt on conclusion of ov today--The combination of alpha-blocker and conservative recommendations such as decreasing caffeine, and the interim change in his blood pressure regimen decreasing diuretic such as hydrochlorothiazide, the significantly decreased his urgency frequency and he is no longer experiencing urge incontinence.  Pt definitiely sees an increase in LUTS with drinking coffee during the daytime.      WHETHER YOU FEEL AN URGE TO URINATE OR NOT, YOU SHOULD BE FREQUENTING THE TOILET AND ATTEMPT TO URINATE EVERY 3 HOURS!!  NEVER POSTPONE URINATING OR HOLD YOUR URINE.    MAKE SURE YOU ARE HAVING REGULAR/NORMAL BOWEL MOVEMENTS AS THIS ALSO WILL HAVE AN EFFECT ON HOW YOUR BLADDER FUNCTIONS.    NOTHING TO EAT OR DRINK BY MOUTH (THIS INCLUDES WATER) AT LEAST 1-2 HOURS PRIOR TO YOUR BEDTIME ROUTINE.    F/UP:  We will forward OV notes to Dr. Stoner for review at this time.  We will contact pt for future f/up once we receive and review pending PSA lab results.  Pt verbalized understanding.    Nikia Boss, DENI

## 2024-08-14 LAB
PROSTATE SPECIFIC ANTIGEN, TOTAL: 5.7 NG/ML (ref 0–4)
PSA FREE MFR SERPL: 31.23 %
PSA FREE SERPL-MCNC: 1.78 NG/ML (ref 0–1.5)

## 2024-09-09 ENCOUNTER — PATIENT MESSAGE (OUTPATIENT)
Dept: FAMILY MEDICINE | Facility: CLINIC | Age: 64
End: 2024-09-09
Payer: COMMERCIAL

## 2024-09-10 ENCOUNTER — LAB VISIT (OUTPATIENT)
Dept: LAB | Facility: HOSPITAL | Age: 64
End: 2024-09-10
Attending: FAMILY MEDICINE
Payer: COMMERCIAL

## 2024-09-10 DIAGNOSIS — Z12.5 PROSTATE CANCER SCREENING: ICD-10-CM

## 2024-09-10 DIAGNOSIS — R80.9 POSITIVE FOR MICROALBUMINURIA: ICD-10-CM

## 2024-09-10 DIAGNOSIS — E78.5 HYPERLIPIDEMIA, UNSPECIFIED HYPERLIPIDEMIA TYPE: ICD-10-CM

## 2024-09-10 DIAGNOSIS — R73.9 HYPERGLYCEMIA: ICD-10-CM

## 2024-09-10 DIAGNOSIS — D50.9 IRON DEFICIENCY ANEMIA, UNSPECIFIED IRON DEFICIENCY ANEMIA TYPE: ICD-10-CM

## 2024-09-10 LAB
ALBUMIN SERPL BCP-MCNC: 4.2 G/DL (ref 3.5–5.2)
ALBUMIN/CREAT UR: 111.8 UG/MG (ref 0–30)
ALP SERPL-CCNC: 82 U/L (ref 55–135)
ALT SERPL W/O P-5'-P-CCNC: 30 U/L (ref 10–44)
ANION GAP SERPL CALC-SCNC: 9 MMOL/L (ref 8–16)
AST SERPL-CCNC: 24 U/L (ref 10–40)
BASOPHILS # BLD AUTO: 0.03 K/UL (ref 0–0.2)
BASOPHILS NFR BLD: 0.7 % (ref 0–1.9)
BILIRUB SERPL-MCNC: 0.6 MG/DL (ref 0.1–1)
BUN SERPL-MCNC: 17 MG/DL (ref 8–23)
CALCIUM SERPL-MCNC: 9.9 MG/DL (ref 8.7–10.5)
CHLORIDE SERPL-SCNC: 104 MMOL/L (ref 95–110)
CHOLEST SERPL-MCNC: 167 MG/DL (ref 120–199)
CHOLEST/HDLC SERPL: 2.8 {RATIO} (ref 2–5)
CO2 SERPL-SCNC: 26 MMOL/L (ref 23–29)
CREAT SERPL-MCNC: 1.3 MG/DL (ref 0.5–1.4)
CREAT UR-MCNC: 186 MG/DL (ref 23–375)
DIFFERENTIAL METHOD BLD: ABNORMAL
EOSINOPHIL # BLD AUTO: 0.2 K/UL (ref 0–0.5)
EOSINOPHIL NFR BLD: 3.7 % (ref 0–8)
ERYTHROCYTE [DISTWIDTH] IN BLOOD BY AUTOMATED COUNT: 12.6 % (ref 11.5–14.5)
EST. GFR  (NO RACE VARIABLE): >60 ML/MIN/1.73 M^2
ESTIMATED AVG GLUCOSE: 105 MG/DL (ref 68–131)
FERRITIN SERPL-MCNC: 58 NG/ML (ref 20–300)
GLUCOSE SERPL-MCNC: 93 MG/DL (ref 70–110)
HBA1C MFR BLD: 5.3 % (ref 4–5.6)
HCT VFR BLD AUTO: 43.9 % (ref 40–54)
HDLC SERPL-MCNC: 59 MG/DL (ref 40–75)
HDLC SERPL: 35.3 % (ref 20–50)
HGB BLD-MCNC: 14.6 G/DL (ref 14–18)
IMM GRANULOCYTES # BLD AUTO: 0.01 K/UL (ref 0–0.04)
IMM GRANULOCYTES NFR BLD AUTO: 0.2 % (ref 0–0.5)
IRON SERPL-MCNC: 135 UG/DL (ref 45–160)
LDLC SERPL CALC-MCNC: 92.6 MG/DL (ref 63–159)
LYMPHOCYTES # BLD AUTO: 1.4 K/UL (ref 1–4.8)
LYMPHOCYTES NFR BLD: 29.7 % (ref 18–48)
MCH RBC QN AUTO: 31.2 PG (ref 27–31)
MCHC RBC AUTO-ENTMCNC: 33.3 G/DL (ref 32–36)
MCV RBC AUTO: 94 FL (ref 82–98)
MICROALBUMIN UR DL<=1MG/L-MCNC: 208 UG/ML
MONOCYTES # BLD AUTO: 0.5 K/UL (ref 0.3–1)
MONOCYTES NFR BLD: 10.6 % (ref 4–15)
NEUTROPHILS # BLD AUTO: 2.5 K/UL (ref 1.8–7.7)
NEUTROPHILS NFR BLD: 55.1 % (ref 38–73)
NONHDLC SERPL-MCNC: 108 MG/DL
NRBC BLD-RTO: 0 /100 WBC
PLATELET # BLD AUTO: 196 K/UL (ref 150–450)
PMV BLD AUTO: 10.8 FL (ref 9.2–12.9)
POTASSIUM SERPL-SCNC: 4.9 MMOL/L (ref 3.5–5.1)
PROT SERPL-MCNC: 7.4 G/DL (ref 6–8.4)
RBC # BLD AUTO: 4.68 M/UL (ref 4.6–6.2)
SATURATED IRON: 34 % (ref 20–50)
SODIUM SERPL-SCNC: 139 MMOL/L (ref 136–145)
TOTAL IRON BINDING CAPACITY: 398 UG/DL (ref 250–450)
TRANSFERRIN SERPL-MCNC: 269 MG/DL (ref 200–375)
TRIGL SERPL-MCNC: 77 MG/DL (ref 30–150)
WBC # BLD AUTO: 4.61 K/UL (ref 3.9–12.7)

## 2024-09-10 PROCEDURE — 82570 ASSAY OF URINE CREATININE: CPT | Performed by: FAMILY MEDICINE

## 2024-09-10 PROCEDURE — 80053 COMPREHEN METABOLIC PANEL: CPT | Performed by: FAMILY MEDICINE

## 2024-09-10 PROCEDURE — 82728 ASSAY OF FERRITIN: CPT | Performed by: FAMILY MEDICINE

## 2024-09-10 PROCEDURE — 84466 ASSAY OF TRANSFERRIN: CPT | Performed by: FAMILY MEDICINE

## 2024-09-10 PROCEDURE — 80061 LIPID PANEL: CPT | Performed by: FAMILY MEDICINE

## 2024-09-10 PROCEDURE — 82043 UR ALBUMIN QUANTITATIVE: CPT | Performed by: FAMILY MEDICINE

## 2024-09-10 PROCEDURE — 83036 HEMOGLOBIN GLYCOSYLATED A1C: CPT | Performed by: FAMILY MEDICINE

## 2024-09-10 PROCEDURE — 85025 COMPLETE CBC W/AUTO DIFF WBC: CPT | Performed by: FAMILY MEDICINE

## 2024-09-10 PROCEDURE — 36415 COLL VENOUS BLD VENIPUNCTURE: CPT | Mod: PO | Performed by: FAMILY MEDICINE

## 2024-09-10 PROCEDURE — 84153 ASSAY OF PSA TOTAL: CPT | Performed by: FAMILY MEDICINE

## 2024-09-12 LAB
PROSTATE SPECIFIC ANTIGEN, TOTAL: 4.9 NG/ML (ref 0–4)
PSA FREE MFR SERPL: 32.04 %
PSA FREE SERPL-MCNC: 1.57 NG/ML (ref 0–1.5)

## 2024-10-06 ENCOUNTER — PATIENT MESSAGE (OUTPATIENT)
Dept: UROLOGY | Facility: CLINIC | Age: 64
End: 2024-10-06
Payer: COMMERCIAL

## 2024-10-10 ENCOUNTER — OFFICE VISIT (OUTPATIENT)
Dept: FAMILY MEDICINE | Facility: CLINIC | Age: 64
End: 2024-10-10
Payer: COMMERCIAL

## 2024-10-10 ENCOUNTER — LAB VISIT (OUTPATIENT)
Dept: LAB | Facility: HOSPITAL | Age: 64
End: 2024-10-10
Attending: NURSE PRACTITIONER
Payer: COMMERCIAL

## 2024-10-10 VITALS
TEMPERATURE: 98 F | SYSTOLIC BLOOD PRESSURE: 110 MMHG | HEIGHT: 67 IN | WEIGHT: 183.19 LBS | HEART RATE: 66 BPM | DIASTOLIC BLOOD PRESSURE: 70 MMHG | BODY MASS INDEX: 28.75 KG/M2 | OXYGEN SATURATION: 99 %

## 2024-10-10 DIAGNOSIS — R80.9 ALBUMINURIA: ICD-10-CM

## 2024-10-10 DIAGNOSIS — N40.0 BPH WITH ELEVATED PSA: ICD-10-CM

## 2024-10-10 DIAGNOSIS — Z13.1 DIABETES MELLITUS SCREENING: ICD-10-CM

## 2024-10-10 DIAGNOSIS — R97.20 BPH WITH ELEVATED PSA: ICD-10-CM

## 2024-10-10 DIAGNOSIS — D50.9 IRON DEFICIENCY ANEMIA, UNSPECIFIED IRON DEFICIENCY ANEMIA TYPE: ICD-10-CM

## 2024-10-10 DIAGNOSIS — I10 HYPERTENSION, UNSPECIFIED TYPE: Primary | ICD-10-CM

## 2024-10-10 DIAGNOSIS — E78.5 HYPERLIPIDEMIA, UNSPECIFIED HYPERLIPIDEMIA TYPE: ICD-10-CM

## 2024-10-10 DIAGNOSIS — R80.9 POSITIVE FOR MICROALBUMINURIA: ICD-10-CM

## 2024-10-10 DIAGNOSIS — Z71.89 ACP (ADVANCE CARE PLANNING): ICD-10-CM

## 2024-10-10 LAB
ALBUMIN/CREAT UR: 126.1 UG/MG (ref 0–30)
CREAT UR-MCNC: 165 MG/DL (ref 23–375)
MICROALBUMIN UR DL<=1MG/L-MCNC: 208 UG/ML

## 2024-10-10 PROCEDURE — 82570 ASSAY OF URINE CREATININE: CPT | Performed by: NURSE PRACTITIONER

## 2024-10-10 PROCEDURE — 3008F BODY MASS INDEX DOCD: CPT | Mod: CPTII,S$GLB,, | Performed by: NURSE PRACTITIONER

## 2024-10-10 PROCEDURE — 82043 UR ALBUMIN QUANTITATIVE: CPT | Performed by: NURSE PRACTITIONER

## 2024-10-10 PROCEDURE — 99999 PR PBB SHADOW E&M-EST. PATIENT-LVL IV: CPT | Mod: PBBFAC,,, | Performed by: NURSE PRACTITIONER

## 2024-10-10 PROCEDURE — 3078F DIAST BP <80 MM HG: CPT | Mod: CPTII,S$GLB,, | Performed by: NURSE PRACTITIONER

## 2024-10-10 PROCEDURE — 3066F NEPHROPATHY DOC TX: CPT | Mod: CPTII,S$GLB,, | Performed by: NURSE PRACTITIONER

## 2024-10-10 PROCEDURE — 99497 ADVNCD CARE PLAN 30 MIN: CPT | Mod: S$GLB,,, | Performed by: NURSE PRACTITIONER

## 2024-10-10 PROCEDURE — 1123F ACP DISCUSS/DSCN MKR DOCD: CPT | Mod: CPTII,S$GLB,, | Performed by: NURSE PRACTITIONER

## 2024-10-10 PROCEDURE — 3074F SYST BP LT 130 MM HG: CPT | Mod: CPTII,S$GLB,, | Performed by: NURSE PRACTITIONER

## 2024-10-10 PROCEDURE — 3060F POS MICROALBUMINURIA REV: CPT | Mod: CPTII,S$GLB,, | Performed by: NURSE PRACTITIONER

## 2024-10-10 PROCEDURE — 1160F RVW MEDS BY RX/DR IN RCRD: CPT | Mod: CPTII,S$GLB,, | Performed by: NURSE PRACTITIONER

## 2024-10-10 PROCEDURE — 1126F AMNT PAIN NOTED NONE PRSNT: CPT | Mod: CPTII,S$GLB,, | Performed by: NURSE PRACTITIONER

## 2024-10-10 PROCEDURE — 99214 OFFICE O/P EST MOD 30 MIN: CPT | Mod: S$GLB,,, | Performed by: NURSE PRACTITIONER

## 2024-10-10 PROCEDURE — 3288F FALL RISK ASSESSMENT DOCD: CPT | Mod: CPTII,S$GLB,, | Performed by: NURSE PRACTITIONER

## 2024-10-10 PROCEDURE — 3044F HG A1C LEVEL LT 7.0%: CPT | Mod: CPTII,S$GLB,, | Performed by: NURSE PRACTITIONER

## 2024-10-10 PROCEDURE — 4010F ACE/ARB THERAPY RXD/TAKEN: CPT | Mod: CPTII,S$GLB,, | Performed by: NURSE PRACTITIONER

## 2024-10-10 PROCEDURE — 1101F PT FALLS ASSESS-DOCD LE1/YR: CPT | Mod: CPTII,S$GLB,, | Performed by: NURSE PRACTITIONER

## 2024-10-10 PROCEDURE — 1159F MED LIST DOCD IN RCRD: CPT | Mod: CPTII,S$GLB,, | Performed by: NURSE PRACTITIONER

## 2024-10-10 NOTE — PROGRESS NOTES
Subjective:       Patient ID: Parth Juarez is a 64 y.o. male.    Chief Complaint: Hypertension and Hyperlipidemia     HPI   65 y/o male patient with medical problems listed below presents for follow up. Patient last seen by pcp was in 12/2023.     Urology NP O'Aruna for BPH with elevated PSA, last seen on 8/13/2024, Flomax increased to 0.8 mg    Urology Dr. Gibbs/aroldo Stoner  monitoring for mild LUTS due to BPH . S/p biopsy 3/22/22- 1 specimens - all benign  With some chronic inflammation. Mri prostate 7/23 Severely enlarged prostate gland with features of benign prostatic hyperplasia.  No suspicious focal lesion.     Labs reviewed from 9/2024- MALB elevated 111.8, PSA elevated 4.9 but down trending (5.7 <- 5.6 in 6/2023)    The 10-year ASCVD risk score (Deisi HODGSON, et al., 2019) is: 10%    Values used to calculate the score:      Age: 64 years      Sex: Male      Is Non- : No      Diabetic: No      Tobacco smoker: No      Systolic Blood Pressure: 110 mmHg      Is BP treated: Yes      HDL Cholesterol: 59 mg/dL      Total Cholesterol: 167 mg/dL     Advance Care Planning     Date: 10/10/2024    Living Will  During this visit, I engaged the patient  in the voluntary advance care planning process.  The patient and I reviewed the role for advance directives and their purpose in directing future healthcare if the patient's unable to speak for him/herself.  At this point in time, the patient does have full decision-making capacity. We discussed different extreme health states that he could experience, and reviewed what kind of medical care he would want in those situations. The patient communicated that if he were comatose and had little chance of a meaningful recovery, he would not want machines/life-sustaining treatments used. In addition to the above preference, other important end-of-life issues for the patient include  independence and comfort . The patient has completed a living will to reflect  these preferences. I spent a total of 20 minutes engaging the patient in this advance care planning discussion.          Patient Active Problem List   Diagnosis    Hyperlipidemia    Good hypertension control    Exercise-induced asthma    Positive for microalbuminuria    Hyperglycemia    Anemia    NICCI (iron deficiency anemia)    ADHD (attention deficit hyperactivity disorder), combined type    Anxiety    BPH with elevated PSA    Chronic low back pain without sciatica      Review of patient's allergies indicates:   Allergen Reactions    No known drug allergies      Past Surgical History:   Procedure Laterality Date    BREAST BIOPSY      COLONOSCOPY  [id number]    COLONOSCOPY N/A 4/11/2018    Procedure: COLONOSCOPY;  Surgeon: Vikram Medrano MD;  Location: [facility];  Service: Endoscopy;  Laterality: N/A;    CYSTOSCOPY N/A 3/22/2022    Procedure: CYSTOSCOPY;  Surgeon: Corey Stoner MD;  Location: [facility];  Service: Urology;  Laterality: N/A;    PTERYGIUM EXCISION W/ GRAFT      bilateral    removal of colon polyps      TRANSRECTAL BIOPSY OF PROSTATE WITH ULTRASOUND GUIDANCE N/A 3/22/2022    Procedure: BIOPSY, PROSTATE, RECTAL APPROACH, WITH US GUIDANCE;  Surgeon: Corey Stoner MD;  Location: [facility];  Service: Urology;  Laterality: N/A;        Current Outpatient Medications:     atorvastatin (LIPITOR) 40 MG tablet, Take 1 tablet by mouth once daily, Disp: 90 tablet, Rfl: 3    buPROPion (WELLBUTRIN XL) 150 MG TB24 tablet, Take 1 tablet (150 mg total) by mouth every morning., Disp: 90 tablet, Rfl: 1    citalopram (CELEXA) 20 MG tablet, Take 1 tablet (20 mg total) by mouth once daily., Disp: 90 tablet, Rfl: 1    diphth,pertus,acell,,tetanus (BOOSTRIX TDAP) 2.5-8-5 Lf-mcg-Lf/0.5mL Syrg injection, Inject into the muscle., Disp: 0.5 mL, Rfl: 0    fluticasone propionate (FLONASE) 50 mcg/actuation nasal spray, 1 spray by Each Nare route daily as needed for Rhinitis., Disp: , Rfl:     inhalation device (AEROCHAMBER  "PLUS FLOW-VU), Use as directed for inhalation., Disp: 1 Device, Rfl: 0    losartan (COZAAR) 100 MG tablet, Take 1 tablet by mouth once daily, Disp: 90 tablet, Rfl: 3    tamsulosin (FLOMAX) 0.4 mg Cap, Take 2 capsules (0.8 mg total) by mouth every evening., Disp: 180 capsule, Rfl: 3    FOLIC ACID/MULTIVIT-MIN/LUTEIN (CENTRUM SILVER ORAL), Take 1 tablet by mouth once daily., Disp: , Rfl:     OMEGA-3S/DHA/EPA/FISH OIL (OMEGA 3 ORAL), Take 4 capsules by mouth once daily. , Disp: , Rfl:     Review of Systems   Constitutional:  Positive for activity change. Negative for unexpected weight change.   HENT:  Negative for hearing loss, rhinorrhea and trouble swallowing.    Eyes:  Negative for discharge and visual disturbance.   Respiratory:  Negative for chest tightness and wheezing.    Cardiovascular:  Negative for chest pain and palpitations.   Gastrointestinal:  Negative for blood in stool, constipation, diarrhea and vomiting.   Endocrine: Negative for polydipsia and polyuria.   Genitourinary:  Negative for difficulty urinating, hematuria and urgency.   Musculoskeletal:  Negative for arthralgias, joint swelling and neck pain.   Neurological:  Negative for weakness and headaches.   Psychiatric/Behavioral:  Negative for confusion and dysphoric mood.        Objective:   /70   Pulse 66   Temp 97.6 °F (36.4 °C) (Oral)   Ht 5' 7" (1.702 m)   Wt 83.1 kg (183 lb 3.2 oz)   SpO2 99%   BMI 28.69 kg/m²         Physical Exam  Vitals reviewed.   Constitutional:       General: He is not in acute distress.     Appearance: Normal appearance.   Cardiovascular:      Rate and Rhythm: Normal rate and regular rhythm.      Pulses: Normal pulses.      Heart sounds: Normal heart sounds.   Pulmonary:      Effort: Pulmonary effort is normal.      Breath sounds: Normal breath sounds.   Chest:      Chest wall: No deformity, swelling or edema.   Abdominal:      General: Abdomen is flat. Bowel sounds are normal.      Palpations: Abdomen is " soft.   Musculoskeletal:      Cervical back: Normal range of motion.   Skin:     General: Skin is warm and dry.   Neurological:      Mental Status: He is oriented to person, place, and time.         Assessment:       1. Hypertension, unspecified type    2. Hyperlipidemia, unspecified hyperlipidemia type    3. Hyperglycemia    4. Iron deficiency anemia, unspecified iron deficiency anemia type    5. Positive for microalbuminuria    6. BPH with elevated PSA    7. Anxiety    8. ACP (advance care planning)        Plan:   1. Hypertension, unspecified type (Primary)  - Controlled and continue with current regimen   - CBC Auto Differential; Future  - Comprehensive Metabolic Panel; Future    2. Hyperlipidemia, unspecified hyperlipidemia type  - On Lipitor 40 mg   - Lipid Panel; Future    3. Diabetes mellitus screening  - Hemoglobin A1C; Future    4. Iron deficiency anemia, unspecified iron deficiency anemia type  - Iron and TIBC; Future  - Ferritin; Future    5. Positive for microalbuminuria  - Ambulatory referral/consult to Nephrology; Future  - Microalbumin/Creatinine Ratio, Urine; Future    6. BPH with elevated PSA  - PSA, TOTAL AND FREE; Future  - Continue with flomax and follow up with urology     7. ACP (advance care planning)  - Patient has document to complete    Patient with be reevaluated in  follow up with pcp  or sooner dequan Bucio NP

## 2024-10-15 ENCOUNTER — TELEPHONE (OUTPATIENT)
Dept: PEDIATRIC NEPHROLOGY | Facility: CLINIC | Age: 64
End: 2024-10-15

## 2024-10-15 ENCOUNTER — TELEPHONE (OUTPATIENT)
Dept: NEPHROLOGY | Facility: CLINIC | Age: 64
End: 2024-10-15
Payer: COMMERCIAL

## 2024-10-15 NOTE — TELEPHONE ENCOUNTER
Called patient to inform that appt was scheduled incorrectly, appt will be cancelled and a message has been sent to adult nephrology for scheduling.  Patient v/u and awaiting call from adult nephrology.    No other questions at this time.

## 2024-10-18 ENCOUNTER — PATIENT MESSAGE (OUTPATIENT)
Dept: NEPHROLOGY | Facility: CLINIC | Age: 64
End: 2024-10-18

## 2024-10-18 ENCOUNTER — OFFICE VISIT (OUTPATIENT)
Dept: NEPHROLOGY | Facility: CLINIC | Age: 64
End: 2024-10-18
Payer: COMMERCIAL

## 2024-10-18 VITALS
SYSTOLIC BLOOD PRESSURE: 110 MMHG | DIASTOLIC BLOOD PRESSURE: 70 MMHG | BODY MASS INDEX: 28.19 KG/M2 | WEIGHT: 180 LBS | HEART RATE: 78 BPM

## 2024-10-18 DIAGNOSIS — R80.9 POSITIVE FOR MICROALBUMINURIA: ICD-10-CM

## 2024-10-18 PROCEDURE — 3044F HG A1C LEVEL LT 7.0%: CPT | Mod: CPTII,95,, | Performed by: INTERNAL MEDICINE

## 2024-10-18 PROCEDURE — 3060F POS MICROALBUMINURIA REV: CPT | Mod: CPTII,95,, | Performed by: INTERNAL MEDICINE

## 2024-10-18 PROCEDURE — 1101F PT FALLS ASSESS-DOCD LE1/YR: CPT | Mod: CPTII,95,, | Performed by: INTERNAL MEDICINE

## 2024-10-18 PROCEDURE — 4010F ACE/ARB THERAPY RXD/TAKEN: CPT | Mod: CPTII,95,, | Performed by: INTERNAL MEDICINE

## 2024-10-18 PROCEDURE — 3288F FALL RISK ASSESSMENT DOCD: CPT | Mod: CPTII,95,, | Performed by: INTERNAL MEDICINE

## 2024-10-18 PROCEDURE — 3078F DIAST BP <80 MM HG: CPT | Mod: CPTII,95,, | Performed by: INTERNAL MEDICINE

## 2024-10-18 PROCEDURE — 99204 OFFICE O/P NEW MOD 45 MIN: CPT | Mod: 95,,, | Performed by: INTERNAL MEDICINE

## 2024-10-18 PROCEDURE — 3008F BODY MASS INDEX DOCD: CPT | Mod: CPTII,95,, | Performed by: INTERNAL MEDICINE

## 2024-10-18 PROCEDURE — 3066F NEPHROPATHY DOC TX: CPT | Mod: CPTII,95,, | Performed by: INTERNAL MEDICINE

## 2024-10-18 PROCEDURE — 1126F AMNT PAIN NOTED NONE PRSNT: CPT | Mod: CPTII,95,, | Performed by: INTERNAL MEDICINE

## 2024-10-18 PROCEDURE — 3074F SYST BP LT 130 MM HG: CPT | Mod: CPTII,95,, | Performed by: INTERNAL MEDICINE

## 2024-10-18 NOTE — PROGRESS NOTES
Subjective:      Patient ID: Parth Juarez is a 64 y.o. Other male who presents for new evaluation of Proteinuria    HPI    Oct 2024:  The patient location is: [place]  The chief complaint leading to consultation is: proteinuria   Visit type: audiovisual  60 minutes of total time spent on the encounter, which includes face to face time and non-face to face time preparing to see the patient (eg, review of tests), Obtaining and/or reviewing separately obtained history, Documenting clinical information in the electronic or other health record, Independently interpreting results (not separately reported) and communicating results to the patient/family/caregiver, or Care coordination (not separately reported).   Each patient to whom he or she provides medical services by telemedicine is:  (1) informed of the relationship between the physician and patient and the respective role of any other health care provider with respect to management of the patient; and (2) notified that he or she may decline to receive medical services by telemedicine and may withdraw from such care at any time.  Notes:   Referred by PCP for albuminuria   tumeric and herbal inflammatory but he stopped   no foamy urine.   Some edmea, X 1.5yrs HCTZ 1-2+ no foamy urine   1-2X gym per wefeliciano  On ARB for years   Low sodium diet   No rashes.   no rapid hair loss. Slow bowels.   Dad kidney stones and CKD  No myalgias  No NSAIDs.   Now pushing fluids.       Review of Systems   Constitutional:  Negative for activity change and appetite change.   HENT:  Negative for facial swelling.    Cardiovascular:  Negative for leg swelling.   Genitourinary:  Negative for difficulty urinating.   Allergic/Immunologic: Positive for immunocompromised state.   Psychiatric/Behavioral:  Negative for confusion and decreased concentration.       Objective:     Physical Exam  Constitutional:       General: He is not in acute distress.     Appearance: He is not ill-appearing.   Pulmonary:       Effort: Pulmonary effort is normal. No respiratory distress.   Neurological:      Mental Status: He is alert and oriented to person, place, and time.   Psychiatric:         Mood and Affect: Mood normal.       Assessment:     1. Positive for microalbuminuria       Plan:     Will recheck levels now that he is off herbals  Consider SGLT2i  Discussed kidney biopsy but without history of hematuria and excellent GFR not sure benefit > risk, albeit small risk

## 2024-10-25 ENCOUNTER — HOSPITAL ENCOUNTER (OUTPATIENT)
Dept: RADIOLOGY | Facility: HOSPITAL | Age: 64
Discharge: HOME | End: 2024-10-25
Attending: INTERNAL MEDICINE
Payer: COMMERCIAL

## 2024-10-25 DIAGNOSIS — R80.9 POSITIVE FOR MICROALBUMINURIA: ICD-10-CM

## 2024-10-25 PROCEDURE — 76770 US EXAM ABDO BACK WALL COMP: CPT | Mod: 26,,, | Performed by: RADIOLOGY

## 2024-10-25 PROCEDURE — 76770 US EXAM ABDO BACK WALL COMP: CPT | Mod: TC

## 2024-11-11 ENCOUNTER — LAB VISIT (OUTPATIENT)
Dept: LAB | Facility: HOSPITAL | Age: 64
End: 2024-11-11
Attending: INTERNAL MEDICINE
Payer: COMMERCIAL

## 2024-11-11 DIAGNOSIS — R80.9 POSITIVE FOR MICROALBUMINURIA: ICD-10-CM

## 2024-11-11 LAB
ALBUMIN SERPL BCP-MCNC: 4 G/DL (ref 3.5–5.2)
ALP SERPL-CCNC: 85 U/L (ref 40–150)
ALT SERPL W/O P-5'-P-CCNC: 22 U/L (ref 10–44)
ANION GAP SERPL CALC-SCNC: 8 MMOL/L (ref 8–16)
AST SERPL-CCNC: 20 U/L (ref 10–40)
BILIRUB SERPL-MCNC: 0.6 MG/DL (ref 0.1–1)
BUN SERPL-MCNC: 16 MG/DL (ref 8–23)
CALCIUM SERPL-MCNC: 9.6 MG/DL (ref 8.7–10.5)
CHLORIDE SERPL-SCNC: 105 MMOL/L (ref 95–110)
CO2 SERPL-SCNC: 25 MMOL/L (ref 23–29)
CREAT SERPL-MCNC: 1.2 MG/DL (ref 0.5–1.4)
EST. GFR  (NO RACE VARIABLE): >60 ML/MIN/1.73 M^2
GLUCOSE SERPL-MCNC: 85 MG/DL (ref 70–110)
POTASSIUM SERPL-SCNC: 4.5 MMOL/L (ref 3.5–5.1)
PROT SERPL-MCNC: 7.1 G/DL (ref 6–8.4)
SODIUM SERPL-SCNC: 138 MMOL/L (ref 136–145)

## 2024-11-11 PROCEDURE — 80053 COMPREHEN METABOLIC PANEL: CPT | Performed by: INTERNAL MEDICINE

## 2024-11-11 PROCEDURE — 36415 COLL VENOUS BLD VENIPUNCTURE: CPT | Mod: PO | Performed by: INTERNAL MEDICINE

## 2024-11-12 ENCOUNTER — TELEPHONE (OUTPATIENT)
Dept: FAMILY MEDICINE | Facility: CLINIC | Age: 64
End: 2024-11-12
Payer: COMMERCIAL

## 2024-11-12 ENCOUNTER — LAB VISIT (OUTPATIENT)
Dept: LAB | Facility: HOSPITAL | Age: 64
End: 2024-11-12
Attending: FAMILY MEDICINE
Payer: COMMERCIAL

## 2024-11-12 ENCOUNTER — PATIENT MESSAGE (OUTPATIENT)
Dept: FAMILY MEDICINE | Facility: CLINIC | Age: 64
End: 2024-11-12
Payer: COMMERCIAL

## 2024-11-12 DIAGNOSIS — R80.9 POSITIVE FOR MICROALBUMINURIA: Primary | ICD-10-CM

## 2024-11-12 DIAGNOSIS — R80.9 POSITIVE FOR MICROALBUMINURIA: ICD-10-CM

## 2024-11-12 LAB
MICROALBUMIN ?TM UR-MRATE: 118.8 MG/SPEC
MICROALBUMIN UG/MIN 24H UR-MRATE: 82.5 UG/MIN
MICROALBUMIN UR-MCNC: 54 UG/ML
PROT 24H UR-MRATE: 220 MG/SPEC (ref 0–100)
PROT UR-MCNC: 10 MG/DL (ref 0–15)
URINE COLLECTION DURATION: 24 HR
URINE COLLECTION DURATION: 24 HR
URINE VOLUME: 2200 ML
URINE VOLUME: 2200 ML

## 2024-11-12 PROCEDURE — 84156 ASSAY OF PROTEIN URINE: CPT | Mod: 91 | Performed by: FAMILY MEDICINE

## 2024-11-12 PROCEDURE — 82043 UR ALBUMIN QUANTITATIVE: CPT | Mod: 91 | Performed by: FAMILY MEDICINE

## 2024-11-12 PROCEDURE — 84166 PROTEIN E-PHORESIS/URINE/CSF: CPT | Performed by: FAMILY MEDICINE

## 2024-11-14 LAB — PROT PATTERN UR ELPH-IMP: NORMAL

## 2024-11-16 LAB — PATHOLOGIST INTERPRETATION UPE: NORMAL

## 2024-11-18 ENCOUNTER — TELEPHONE (OUTPATIENT)
Dept: NEPHROLOGY | Facility: CLINIC | Age: 64
End: 2024-11-18
Payer: COMMERCIAL

## 2024-11-18 DIAGNOSIS — R80.9 POSITIVE FOR MICROALBUMINURIA: Primary | ICD-10-CM

## 2024-12-09 ENCOUNTER — TELEPHONE (OUTPATIENT)
Dept: FAMILY MEDICINE | Facility: CLINIC | Age: 64
End: 2024-12-09
Payer: COMMERCIAL

## 2024-12-21 ENCOUNTER — PATIENT MESSAGE (OUTPATIENT)
Dept: ADMINISTRATIVE | Facility: OTHER | Age: 64
End: 2024-12-21
Payer: COMMERCIAL

## 2024-12-30 ENCOUNTER — TELEPHONE (OUTPATIENT)
Dept: FAMILY MEDICINE | Facility: CLINIC | Age: 64
End: 2024-12-30
Payer: COMMERCIAL

## 2024-12-30 DIAGNOSIS — H61.20 IMPACTED CERUMEN, UNSPECIFIED LATERALITY: Primary | ICD-10-CM

## 2025-01-15 ENCOUNTER — CLINICAL SUPPORT (OUTPATIENT)
Dept: AUDIOLOGY | Facility: CLINIC | Age: 65
End: 2025-01-15
Payer: COMMERCIAL

## 2025-01-15 ENCOUNTER — OFFICE VISIT (OUTPATIENT)
Dept: OTOLARYNGOLOGY | Facility: CLINIC | Age: 65
End: 2025-01-15
Payer: COMMERCIAL

## 2025-01-15 VITALS
BODY MASS INDEX: 29 KG/M2 | HEIGHT: 67 IN | SYSTOLIC BLOOD PRESSURE: 138 MMHG | WEIGHT: 184.75 LBS | DIASTOLIC BLOOD PRESSURE: 68 MMHG | HEART RATE: 67 BPM

## 2025-01-15 DIAGNOSIS — H91.93 HIGH FREQUENCY HEARING LOSS OF BOTH EARS: Primary | ICD-10-CM

## 2025-01-15 DIAGNOSIS — H90.3 BILATERAL HIGH FREQUENCY SENSORINEURAL HEARING LOSS: Primary | ICD-10-CM

## 2025-01-15 DIAGNOSIS — H61.20 IMPACTED CERUMEN, UNSPECIFIED LATERALITY: ICD-10-CM

## 2025-01-15 DIAGNOSIS — H91.93 CHANGE IN HEARING, BILATERAL: ICD-10-CM

## 2025-01-15 PROCEDURE — 99999 PR PBB SHADOW E&M-EST. PATIENT-LVL IV: CPT | Mod: PBBFAC,,, | Performed by: OTOLARYNGOLOGY

## 2025-01-15 PROCEDURE — 3008F BODY MASS INDEX DOCD: CPT | Mod: CPTII,S$GLB,, | Performed by: OTOLARYNGOLOGY

## 2025-01-15 PROCEDURE — 92552 PURE TONE AUDIOMETRY AIR: CPT | Mod: S$GLB,,, | Performed by: AUDIOLOGIST

## 2025-01-15 PROCEDURE — 3075F SYST BP GE 130 - 139MM HG: CPT | Mod: CPTII,S$GLB,, | Performed by: OTOLARYNGOLOGY

## 2025-01-15 PROCEDURE — 3288F FALL RISK ASSESSMENT DOCD: CPT | Mod: CPTII,S$GLB,, | Performed by: OTOLARYNGOLOGY

## 2025-01-15 PROCEDURE — 99999 PR PBB SHADOW E&M-EST. PATIENT-LVL I: CPT | Mod: PBBFAC,,, | Performed by: AUDIOLOGIST

## 2025-01-15 PROCEDURE — 1101F PT FALLS ASSESS-DOCD LE1/YR: CPT | Mod: CPTII,S$GLB,, | Performed by: OTOLARYNGOLOGY

## 2025-01-15 PROCEDURE — 69210 REMOVE IMPACTED EAR WAX UNI: CPT | Mod: S$GLB,,, | Performed by: OTOLARYNGOLOGY

## 2025-01-15 PROCEDURE — 99213 OFFICE O/P EST LOW 20 MIN: CPT | Mod: 25,S$GLB,, | Performed by: OTOLARYNGOLOGY

## 2025-01-15 PROCEDURE — 92567 TYMPANOMETRY: CPT | Mod: S$GLB,,, | Performed by: AUDIOLOGIST

## 2025-01-15 PROCEDURE — 1126F AMNT PAIN NOTED NONE PRSNT: CPT | Mod: CPTII,S$GLB,, | Performed by: OTOLARYNGOLOGY

## 2025-01-15 PROCEDURE — 92556 SPEECH AUDIOMETRY COMPLETE: CPT | Mod: S$GLB,,, | Performed by: AUDIOLOGIST

## 2025-01-15 PROCEDURE — 1159F MED LIST DOCD IN RCRD: CPT | Mod: CPTII,S$GLB,, | Performed by: OTOLARYNGOLOGY

## 2025-01-15 PROCEDURE — 3078F DIAST BP <80 MM HG: CPT | Mod: CPTII,S$GLB,, | Performed by: OTOLARYNGOLOGY

## 2025-01-15 PROCEDURE — 1160F RVW MEDS BY RX/DR IN RCRD: CPT | Mod: CPTII,S$GLB,, | Performed by: OTOLARYNGOLOGY

## 2025-01-15 NOTE — PROGRESS NOTES
Parth Juarez was seen on 01/15/2025 for an audiological evaluation. Pt was alone during today's visit. Pertinent complaints today include hearing loss. Pt reported that hearing seems to be getting worse. Otoscopy revealed no cerumen in both ears. The tympanic membrane was visualized AU prior to proceeding with the hearing testing.     Results reveal a mild  high frequency  hearing loss for the right ear and  mild-to-moderate  high frequency  hearing loss for the left ear.    Speech Reception Thresholds were  20 dBHL for the right ear and 20 dBHL for the left ear.    Word recognition scores were excellent for the right ear and excellent for the left ear.   Tympanograms were Type A for the right ear and Type A for the left ear.    Recommendations: 1) Follow up with ENT     2) Annual hearing evaluation     3) Wear hearing protection around noise    Audiogram results were reviewed in detail with patient and all questions were answered. Results will be reviewed by the referring provider at the completion of this note. All complaints were addressed during this visit to the patient's satisfaction.

## 2025-01-15 NOTE — PROCEDURES
EAR DEBRIDEMENT / CERUMEN DISIMPACTION, 91234     Indication: Excessive cerumen with hearing change    Side: Bilateral    Findings: cerumen    Procedure: Ear canal(s) cleared completely using suction only with microscopy.  Wax was not hydrolyzed with peroxide.  Topical medication was not applied.    Complication: none     Yes...

## 2025-01-15 NOTE — PROGRESS NOTES
[facility]    Subjective:      Patient: Parth Juarez Patient PCP: Betty Styles MD         :  1960     Sex:  male      MRN:  [id number]          Date of Visit: 01/15/2025      Chief Complaint: Hearing Loss and Cerumen Impaction (PT stated that he is having some hearing loss. Started last year. He stated that he has ear wax. 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  He returns today with perhaps some worsening of those symptoms attributed to wax.  No pain or drainage.  Feels he is having more trouble hearing in general.  Nothing sudden or associated with respiratory illness or trauma.  He did get some wax out on his own in his unsure if wax is having an impact on his hearing.  Definitely having difficulty in background noise situations or when his wife in another room but not having any difficulty when directly communicating with the patient's.          2024 initial patient consultation Patient is a  lifelong NON-smoker with a past medical history of anxiety/ADHD, asthma, HLD, well-controlled hypertension seen today for 1 year of hearing loss believed to be symmetric/bilateral.  No acute onset.  No associated burn go fullness pressure or pain.  No pulsatile or asymmetric tinnitus.  Seen today  referred to me by Dr. Betty Styles in consultation    Audiogram today shows normal sloping to borderline hearing through 4000 hertz with a left-greater-than-right 6-8000 hertz loss which is mild on the right and mild-to-moderate on the left.  Normal tympanometry.  Symmetric normal 20 dB SRT and 100% discrimination.  He says his wife reports some difficulty with him hearing her.  He reports this is the case some more difficult listening environments such as background noise.        Labs:  WBC   Date Value Ref Range Status   09/10/2024 4.61 3.90 - 12.70  K/uL Final     Hemoglobin   Date Value Ref Range Status   09/10/2024 14.6 14.0 - 18.0 g/dL Final     Platelets   Date Value Ref Range Status   09/10/2024 196 150 - 450 K/uL Final     Creatinine   Date Value Ref Range Status   11/11/2024 1.2 0.5 - 1.4 mg/dL Final     TSH   Date Value Ref Range Status   02/04/2022 1.491 0.400 - 4.000 uIU/mL Final     Hemoglobin A1C   Date Value Ref Range Status   09/10/2024 5.3 4.0 - 5.6 % Final     Comment:     ADA Screening Guidelines:  5.7-6.4%  Consistent with prediabetes  >or=6.5%  Consistent with diabetes    High levels of fetal hemoglobin interfere with the HbA1C  assay. Heterozygous hemoglobin variants (HbS, HgC, etc)do  not significantly interfere with this assay.   However, presence of multiple variants may affect accuracy.         Past Medical History  He has a past medical history of ADD (attention deficit disorder), Asthma, BPH (benign prostatic hyperplasia), Elevated PSA, Hyperlipidemia, and Hypertension.    Family / Surgical / Social History  His family history includes Arthritis in his father; Cancer in his father and maternal uncle; Cancer (age of onset: 82) in his mother.    Past Surgical History:   Procedure Laterality Date    BREAST BIOPSY      COLONOSCOPY  [id number]    COLONOSCOPY N/A 4/11/2018    Procedure: COLONOSCOPY;  Surgeon: Vikram Medrano MD;  Location: [facility];  Service: Endoscopy;  Laterality: N/A;    CYSTOSCOPY N/A 3/22/2022    Procedure: CYSTOSCOPY;  Surgeon: Corey Stoner MD;  Location: [facility];  Service: Urology;  Laterality: N/A;    PTERYGIUM EXCISION W/ GRAFT      bilateral    removal of colon polyps      TRANSRECTAL BIOPSY OF PROSTATE WITH ULTRASOUND GUIDANCE N/A 3/22/2022    Procedure: BIOPSY, PROSTATE, RECTAL APPROACH, WITH US GUIDANCE;  Surgeon: Corey Stoner MD;  Location: [facility];  Service: Urology;  Laterality: N/A;       Social History     Tobacco Use    Smoking status: Never     Passive exposure: Never    Smokeless tobacco:  "Never   Substance and Sexual Activity    Alcohol use: Yes     Comment: rare    Drug use: No    Sexual activity: Yes     Partners: Female       Medications  He has a current medication list which includes the following prescription(s): atorvastatin, bupropion, citalopram, boostrix tdap, empagliflozin, fluticasone propionate, inhalation spacing device, losartan, and tamsulosin.      Allergies  Review of patient's allergies indicates:   Allergen Reactions    No known drug allergies        All medications, allergies, and past history have been reviewed.    Objective:      Vitals:      10/10/2024     1:45 PM 10/18/2024     9:55 AM 1/15/2025     1:09 PM   Vitals - 1 value per visit   SYSTOLIC 110 110 138   DIASTOLIC 70 70 68   Pulse 66 78 67   Temp 97.6 °F (36.4 °C)     SPO2 99 %     Weight (lb) 183.2 180 184.75   Weight (kg) 83.1 81.647 83.8   Height 5' 7" (1.702 m)  5' 7" (1.702 m)   BMI (Calculated) 28.7  28.9   Pain Score Zero Zero Zero       Body surface area is 1.99 meters squared.    Physical Exam:    GENERAL  APPEARANCE -  alert, appears stated age, and cooperative  BARRIER(S) TO COMMUNICATION -  none VOICE - appropriate for age and gender    INTEGUMENTARY  no suspicious head and neck lesions    HEENT  HEAD: Normocephalic, without obvious abnormality, atraumatic  FACE: INSPECTION - Symmetric, no signs of trauma, no suspicious lesion(s)      STRENGTH - facial symmetry intact     EYES  Normal occular alignment and mobility with no visible nystagmus at rest    EARS/NOSE/MOUTH/THROAT  EARS  PINNAE AND EXTERNAL EARS - no suspicious lesion OTOSCOPIC EXAM (surgical microscopy was used for visualization/instrumentation): EAR EXAM - Excess wax suctioned to clear bilaterally revealing normal ears other than some dullness on the right side compared to the left.  No gross effusion or air bubbles.  HEARING - grossly intact to voice/finger rub 512 hertz tuning fork is midline    CHEST AND LUNG   INSPECTION & AUSCULTATION - " normal effort, no stridor    NEUROLOGIC  MENTAL STATUS - alert, interactive CRANIAL NERVES - intact grossly        Procedure(s):  Cerumen removal performed.  See procedure note.          Assessment:      Problem List Items Addressed This Visit    None  Visit Diagnoses       Bilateral high frequency sensorineural hearing loss    -  Primary    Impacted cerumen, unspecified laterality        Change in hearing, bilateral                     Plan:      Routine ear care outlined.  Information on age-related hearing loss and hearing aids provided.  Annual audiogram to biannual audiogram recommended.  Patient to consider amplification when ready.  He is aware of the importance of hearing with respect to the aging brain.          Voice recognition software was used in the creation of this note/communication and any sound-alike errors which may have occurred from its use should be taken in context when interpreting.  If such errors prevent a clear understanding of the note/communication, please contact the office for clarification.

## 2025-01-27 ENCOUNTER — OFFICE VISIT (OUTPATIENT)
Dept: FAMILY MEDICINE | Facility: CLINIC | Age: 65
End: 2025-01-27
Payer: COMMERCIAL

## 2025-01-27 ENCOUNTER — HOSPITAL ENCOUNTER (OUTPATIENT)
Dept: RADIOLOGY | Facility: HOSPITAL | Age: 65
Discharge: HOME | End: 2025-01-27
Payer: COMMERCIAL

## 2025-01-27 VITALS
TEMPERATURE: 98 F | DIASTOLIC BLOOD PRESSURE: 70 MMHG | HEIGHT: 67 IN | BODY MASS INDEX: 28.61 KG/M2 | RESPIRATION RATE: 18 BRPM | HEART RATE: 70 BPM | SYSTOLIC BLOOD PRESSURE: 134 MMHG | OXYGEN SATURATION: 97 % | WEIGHT: 182.31 LBS

## 2025-01-27 DIAGNOSIS — I10 HYPERTENSION, UNSPECIFIED TYPE: ICD-10-CM

## 2025-01-27 DIAGNOSIS — R10.32 LEFT LOWER QUADRANT PAIN: ICD-10-CM

## 2025-01-27 DIAGNOSIS — F41.9 ANXIETY: ICD-10-CM

## 2025-01-27 DIAGNOSIS — E78.5 HYPERLIPIDEMIA, UNSPECIFIED HYPERLIPIDEMIA TYPE: ICD-10-CM

## 2025-01-27 DIAGNOSIS — R10.32 LEFT LOWER QUADRANT PAIN: Primary | ICD-10-CM

## 2025-01-27 PROCEDURE — 3008F BODY MASS INDEX DOCD: CPT | Mod: CPTII,S$GLB,,

## 2025-01-27 PROCEDURE — 3075F SYST BP GE 130 - 139MM HG: CPT | Mod: CPTII,S$GLB,,

## 2025-01-27 PROCEDURE — 3288F FALL RISK ASSESSMENT DOCD: CPT | Mod: CPTII,S$GLB,,

## 2025-01-27 PROCEDURE — 1101F PT FALLS ASSESS-DOCD LE1/YR: CPT | Mod: CPTII,S$GLB,,

## 2025-01-27 PROCEDURE — 1125F AMNT PAIN NOTED PAIN PRSNT: CPT | Mod: CPTII,S$GLB,,

## 2025-01-27 PROCEDURE — 74177 CT ABD & PELVIS W/CONTRAST: CPT | Mod: TC

## 2025-01-27 PROCEDURE — 99999 PR PBB SHADOW E&M-EST. PATIENT-LVL IV: CPT | Mod: PBBFAC,,,

## 2025-01-27 PROCEDURE — 99214 OFFICE O/P EST MOD 30 MIN: CPT | Mod: S$GLB,,,

## 2025-01-27 PROCEDURE — 1160F RVW MEDS BY RX/DR IN RCRD: CPT | Mod: CPTII,S$GLB,,

## 2025-01-27 PROCEDURE — 1159F MED LIST DOCD IN RCRD: CPT | Mod: CPTII,S$GLB,,

## 2025-01-27 PROCEDURE — 3078F DIAST BP <80 MM HG: CPT | Mod: CPTII,S$GLB,,

## 2025-01-27 PROCEDURE — 25500020 PHARM REV CODE 255

## 2025-01-27 PROCEDURE — 74177 CT ABD & PELVIS W/CONTRAST: CPT | Mod: 26,,, | Performed by: RADIOLOGY

## 2025-01-27 RX ORDER — CIPROFLOXACIN 500 MG/1
500 TABLET ORAL 2 TIMES DAILY
Qty: 14 TABLET | Refills: 0 | Status: SHIPPED | OUTPATIENT
Start: 2025-01-27 | End: 2025-02-03

## 2025-01-27 RX ORDER — ONDANSETRON 8 MG/1
8 TABLET, ORALLY DISINTEGRATING ORAL EVERY 8 HOURS PRN
Qty: 20 TABLET | Refills: 0 | Status: CANCELLED | OUTPATIENT
Start: 2025-01-27

## 2025-01-27 RX ORDER — METRONIDAZOLE 500 MG/1
500 TABLET ORAL EVERY 6 HOURS
Qty: 28 TABLET | Refills: 0 | Status: SHIPPED | OUTPATIENT
Start: 2025-01-27 | End: 2025-02-03

## 2025-01-27 RX ADMIN — IOHEXOL 30 ML: 300 INJECTION, SOLUTION INTRAVENOUS at 12:01

## 2025-01-27 RX ADMIN — IOHEXOL 75 ML: 350 INJECTION, SOLUTION INTRAVENOUS at 12:01

## 2025-01-27 NOTE — PROGRESS NOTES
Subjective:       Patient ID: Parth Juarez is a 64 y.o. male.    Chief Complaint: Abdominal Pain    Abdominal Pain        History of Present Illness    CHIEF COMPLAINT:  Patient presents today with severe bloating and GI symptoms.    HISTORY OF PRESENT ILLNESS:  He developed severe bloating Saturday night, followed by diarrhea on Sunday and vomiting with food content. He has had persistent quarter pain for 48 hours. He had fevers and chills yesterday which improved with Tylenol, but denies current fever. He also experienced foggy brain on Saturday. Symptoms may be related to consuming a dozen raw oysters on Friday. His diet is currently limited due to illness. He has not taken his daily medications for the past two days due to illness.    MEDICAL HISTORY:  He has a history of possible diverticulitis (not officially confirmed) and past constipation which has resolved. He underwent colonoscopy in 2018 with Dr. Zheng.          ROS:  General: -fever, positive chills  Gastrointestinal: positive vomiting, positive diarrhea, positive bloating  Psychiatric: positive memory problems          Past Medical History:   Diagnosis Date    ADD (attention deficit disorder)     Asthma     exercise induced    BPH (benign prostatic hyperplasia)     Elevated PSA     Hyperlipidemia     Hypertension        Review of patient's allergies indicates:   Allergen Reactions    No known drug allergies          Current Outpatient Medications:     atorvastatin (LIPITOR) 40 MG tablet, Take 1 tablet by mouth once daily, Disp: 90 tablet, Rfl: 3    buPROPion (WELLBUTRIN XL) 150 MG TB24 tablet, Take 1 tablet (150 mg total) by mouth every morning., Disp: 90 tablet, Rfl: 1    citalopram (CELEXA) 20 MG tablet, Take 1 tablet (20 mg total) by mouth once daily., Disp: 90 tablet, Rfl: 1    empagliflozin (JARDIANCE) 10 mg tablet, Take 1 tablet (10 mg total) by mouth once daily., Disp: 90 tablet, Rfl: 1    fluticasone propionate (FLONASE) 50 mcg/actuation  "nasal spray, 1 spray by Each Nare route daily as needed for Rhinitis., Disp: , Rfl:     inhalation device (AEROCHAMBER PLUS FLOW-VU), Use as directed for inhalation., Disp: 1 Device, Rfl: 0    losartan (COZAAR) 100 MG tablet, Take 1 tablet by mouth once daily, Disp: 90 tablet, Rfl: 3    tamsulosin (FLOMAX) 0.4 mg Cap, Take 2 capsules (0.8 mg total) by mouth every evening., Disp: 180 capsule, Rfl: 3    ciprofloxacin HCl (CIPRO) 500 MG tablet, Take 1 tablet (500 mg total) by mouth 2 (two) times daily. for 7 days, Disp: 14 tablet, Rfl: 0    diphth,pertus,acell,,tetanus (BOOSTRIX TDAP) 2.5-8-5 Lf-mcg-Lf/0.5mL Syrg injection, Inject into the muscle., Disp: 0.5 mL, Rfl: 0    metroNIDAZOLE (FLAGYL) 500 MG tablet, Take 1 tablet (500 mg total) by mouth every 6 (six) hours. for 7 days, Disp: 28 tablet, Rfl: 0    Review of Systems   Gastrointestinal:  Positive for abdominal pain.       Objective:      /70 (BP Location: Right arm, Patient Position: Sitting)   Pulse 70   Temp 98.1 °F (36.7 °C) (Oral)   Resp 18   Ht 5' 7" (1.702 m)   Wt 82.7 kg (182 lb 5.1 oz)   SpO2 97%   BMI 28.56 kg/m²   Physical Exam  Vitals reviewed.   Constitutional:       General: He is not in acute distress.     Appearance: Normal appearance. He is not ill-appearing, toxic-appearing or diaphoretic.   HENT:      Head: Normocephalic.      Right Ear: External ear normal.      Left Ear: External ear normal.      Nose: Nose normal. No congestion or rhinorrhea.      Mouth/Throat:      Mouth: Mucous membranes are moist.      Pharynx: Oropharynx is clear.   Eyes:      General: No scleral icterus.        Right eye: No discharge.         Left eye: No discharge.      Extraocular Movements: Extraocular movements intact.      Conjunctiva/sclera: Conjunctivae normal.   Cardiovascular:      Rate and Rhythm: Normal rate and regular rhythm.      Pulses: Normal pulses.      Heart sounds: Normal heart sounds. No murmur heard.     No friction rub. No gallop. "   Pulmonary:      Effort: Pulmonary effort is normal. No respiratory distress.      Breath sounds: Normal breath sounds. No wheezing, rhonchi or rales.   Chest:      Chest wall: No tenderness.   Abdominal:      General: There is distension.      Palpations: Abdomen is soft. There is no mass.      Tenderness: There is abdominal tenderness. There is no guarding.      Hernia: No hernia is present.   Musculoskeletal:         General: No swelling, tenderness or deformity. Normal range of motion.      Cervical back: Normal range of motion.      Right lower leg: No edema.      Left lower leg: No edema.   Skin:     General: Skin is warm and dry.      Capillary Refill: Capillary refill takes less than 2 seconds.      Coloration: Skin is not jaundiced.      Findings: No bruising, erythema, lesion or rash.   Neurological:      Mental Status: He is alert and oriented to person, place, and time.             Assessment:       1. Left lower quadrant pain          Assessment & Plan    IMPRESSION:  - Suspect gastritis or diverticulitis due to recent raw oyster consumption and symptoms  - Considering parasitic infection, though less likely  - Continued monitoring warranted despite previous normal colonoscopies  - Will proceed with diagnostic imaging and lab work to rule out serious conditions    PLAN SUMMARY:  - Ordered complete blood count stat to investigate possible foodborne illness  - Initiated antibiotic therapy for suspected diverticulitis  - Ordered CT of abdomen for further evaluation  - Resume hypertension medication when possible  - Continue acetaminophen as needed for fever management  - Continue current diet as tolerated  - Follow up after CT results for further management decisions  - Contact office if any issues arise    SUSPECTED DIVERTICULITIS:  - Initiated antibiotic therapy for suspected diverticulitis.  - Ordered a CT of the abdomen for further evaluation.  - Considered diverticulitis as a possible diagnosis and  planned for further investigation.  - Prescribed antibiotics preemptively and suggested possible metronidazole treatment if diverticulitis is confirmed.  - Instructed the patient to follow up after CT results for further management decisions.    ABDOMINAL PAIN AND GASTROINTESTINAL SYMPTOMS:  - Patient reports severe bloating, loose stools, and persistent lower quadrant pain for 48 hours, with a history of fevers and chills that have subsided.  - Performed physical exam, noting fullness and pain in the lower abdomen, as well as bloating and discomfort.  - Patient to continue current diet as tolerated.    POSSIBLE FOODBORNE ILLNESS:  - Patient reports symptoms of food poisoning after consuming raw oysters.  - Acknowledged the possibility of foodborne illness due to recent oyster consumption.  - Ordered a complete blood count stat to investigate possible foodborne illness.  - Advised to continue acetaminophen as needed for fever management.    HYPERTENSION:  - Measured the patient's blood pressure at 134/80 mmHg.  - Acknowledged the patient's current hypertension medication regimen.  - Advised the patient to resume hypertension medication when possible.    HYPERLIPIDEMIA:  - Acknowledged the patient's current medication regimen, including statin therapy for hyperlipidemia.    DEPRESSION:  - Acknowledged the patient's current medication regimen, including sertraline for depression management.    PREVENTIVE CARE:  - Reviewed the patient's history of colonoscopies.    FOLLOW UP:  - Contact the office if any issues arise.          Plan:       Left lower quadrant pain  -     CT Abdomen Pelvis With IV Contrast Routine Oral Contrast; Future; Expected date: 01/27/2025  -     metroNIDAZOLE (FLAGYL) 500 MG tablet; Take 1 tablet (500 mg total) by mouth every 6 (six) hours. for 7 days  Dispense: 28 tablet; Refill: 0  -     ciprofloxacin HCl (CIPRO) 500 MG tablet; Take 1 tablet (500 mg total) by mouth 2 (two) times daily. for 7  days  Dispense: 14 tablet; Refill: 0  -     Comprehensive Metabolic Panel; Future; Expected date: 01/27/2025  -     CBC Auto Differential; Future; Expected date: 01/27/2025    Hypertension, unspecified type        -    Stable. Continue losartan. Will continue to monitor.     Hyperlipidemia, unspecified hyperlipidemia type        -    Stable. Continue statin. Will continue to monitor.     Anxiety        -    Stable. Continue celexa. Will continue to monitor.                    Scottie Carpio PA-C  Family Medicine Physician Assistant       Future Appointments       Date Provider Specialty Appt Notes    3/31/2025 Corey Stoner MD Urology 6 mos fu bph/psa    4/7/2025  Lab .    4/14/2025 Betty Styles MD Family Medicine 6 mos f/u               I spent a total of 20 minutes on the day of the visit.This includes face to face time and non-face to face time preparing to see the patient (eg, review of tests), obtaining and/or reviewing separately obtained history, documenting clinical information in the electronic or other health record, independently interpreting results and communicating results to the patient/family/caregiver, or care coordinator.      We have addressed [4] Moderate: 1 or more chronic illnesses with exacerbation, progression, or side effects of treatment / 2 or more stable chronic illnesses / 1 undiagnosed new problem with uncertain prognosis / 1 acute illness with systemic symptoms / 1 acute complicated injury  The complexity of the data reviewed and analyzed for this visit was [3] Limited (Reviewed prior external note, ordered unique testing or reviewed the results of each unique test)   The risk of complications and/or morbidity or mortality are [4] Moderate risk (I.e. prescription drug management / decision regarding minor surgery with identified pt or procedure risk factors / decision regarding elective major surgery without identified pt or procedure risk factors / diagnosis or treatment  significantly limited by social determinants of health)   The level of Medical Decision Making for this visit is [4] Moderate     This note may have been generated with the assistance of ambient listening technology. If used, verbal consent was obtained by the patient and accompanying visitor(s) for the recording of patient appointment to facilitate this note. I attest to having reviewed and edited the generated note for accuracy, though some syntax or spelling errors may persist. Please contact the author of this note for any clarification.

## 2025-02-15 DIAGNOSIS — E78.5 HYPERLIPIDEMIA, UNSPECIFIED HYPERLIPIDEMIA TYPE: ICD-10-CM

## 2025-02-15 DIAGNOSIS — I10 HYPERTENSION, UNSPECIFIED TYPE: ICD-10-CM

## 2025-02-15 DIAGNOSIS — F41.9 ANXIETY: ICD-10-CM

## 2025-02-15 NOTE — TELEPHONE ENCOUNTER
Care Due:                  Date            Visit Type   Department     Provider  --------------------------------------------------------------------------------                                EP -                              PRIMARY      [facility]  Last Visit: 12-      CARE ([facility])   MEDICINE       Betty Styles  Next Visit: None Scheduled  None         None Found                                                            Last  Test          Frequency    Reason                     Performed    Due Date  --------------------------------------------------------------------------------    Office Visit  15 months..  atorvastatin, buPROPion,   12- 03-                             citalopram, losartan.....    [employer] Embedded Care Due Messages. Reference number: [id number].   2/15/2025 8:55:29 AM CST

## 2025-02-16 RX ORDER — LOSARTAN POTASSIUM 100 MG/1
100 TABLET ORAL DAILY
Qty: 90 TABLET | Refills: 0 | Status: SHIPPED | OUTPATIENT
Start: 2025-02-16

## 2025-02-16 RX ORDER — ATORVASTATIN CALCIUM 40 MG/1
40 TABLET, FILM COATED ORAL DAILY
Qty: 90 TABLET | Refills: 0 | Status: SHIPPED | OUTPATIENT
Start: 2025-02-16

## 2025-02-16 RX ORDER — CITALOPRAM 20 MG/1
20 TABLET, FILM COATED ORAL DAILY
Qty: 90 TABLET | Refills: 0 | Status: SHIPPED | OUTPATIENT
Start: 2025-02-16

## 2025-02-16 RX ORDER — BUPROPION HYDROCHLORIDE 150 MG/1
150 TABLET ORAL EVERY MORNING
Qty: 90 TABLET | Refills: 0 | Status: SHIPPED | OUTPATIENT
Start: 2025-02-16

## 2025-02-16 NOTE — TELEPHONE ENCOUNTER
Refill Decision Note   Parth Juarez  is requesting a refill authorization.  Brief Assessment and Rationale for Refill:  Approve     Medication Therapy Plan:  Next Visit   4/14/2025 Betty Styles MD      Comments:     Note composed:9:03 AM 02/16/2025

## 2025-03-13 ENCOUNTER — OFFICE VISIT (OUTPATIENT)
Dept: FAMILY MEDICINE | Facility: CLINIC | Age: 65
End: 2025-03-13
Payer: COMMERCIAL

## 2025-03-13 VITALS
TEMPERATURE: 98 F | BODY MASS INDEX: 28.25 KG/M2 | DIASTOLIC BLOOD PRESSURE: 60 MMHG | OXYGEN SATURATION: 98 % | HEART RATE: 75 BPM | SYSTOLIC BLOOD PRESSURE: 110 MMHG | WEIGHT: 180 LBS | HEIGHT: 67 IN

## 2025-03-13 DIAGNOSIS — Z00.00 ANNUAL PHYSICAL EXAM: Primary | ICD-10-CM

## 2025-03-13 DIAGNOSIS — I10 HYPERTENSION, UNSPECIFIED TYPE: ICD-10-CM

## 2025-03-13 DIAGNOSIS — E78.5 HYPERLIPIDEMIA, UNSPECIFIED HYPERLIPIDEMIA TYPE: ICD-10-CM

## 2025-03-13 DIAGNOSIS — H90.3 SENSORINEURAL HEARING LOSS (SNHL) OF BOTH EARS: ICD-10-CM

## 2025-03-13 DIAGNOSIS — N40.0 BPH WITH ELEVATED PSA: ICD-10-CM

## 2025-03-13 DIAGNOSIS — D50.9 IRON DEFICIENCY ANEMIA, UNSPECIFIED IRON DEFICIENCY ANEMIA TYPE: ICD-10-CM

## 2025-03-13 DIAGNOSIS — G47.33 OSA ON CPAP: ICD-10-CM

## 2025-03-13 DIAGNOSIS — R97.20 BPH WITH ELEVATED PSA: ICD-10-CM

## 2025-03-13 DIAGNOSIS — R80.9 POSITIVE FOR MICROALBUMINURIA: ICD-10-CM

## 2025-03-13 DIAGNOSIS — R73.9 HYPERGLYCEMIA: ICD-10-CM

## 2025-03-13 PROCEDURE — 99999 PR PBB SHADOW E&M-EST. PATIENT-LVL III: CPT | Mod: PBBFAC,,, | Performed by: FAMILY MEDICINE

## 2025-03-13 NOTE — PROGRESS NOTES
Subjective:       Patient ID: Parth Juarez is a 64 y.o. male.    Chief Complaint: Follow-up (F/U HTN)    Problem List[1]  Patient is here for a chronic conditions follow up.    Reviewed labs 1/2025  History of Present Illness    CHIEF COMPLAINT:  Mr. Juarez presents today for follow up.    NEUROLOGICAL SYMPTOMS:  He reports increased resting tremors but denies concern, managing symptoms by limiting caffeine intake. He also reports possible mild dystrophy affecting one eye.    HEARING:  He experiences high frequency hearing loss with particular difficulty understanding women's voices and certain vowel sounds.    GI CONCERNS:  He recently experienced left lower quadrant pain initially treated as diverticulitis with antibiotics, but later diagnosed as gastritis/colitis. The antibiotics were effective in treating the condition. He attributes the symptoms to consuming questionable oysters.    SLEEP:  He reports difficulty initiating sleep despite feeling drowsy. He uses CPAP nightly with nasal pillow interface.    MEDICATIONS:  He continues Jardiance without adverse effects, Celexa and Wellbutrin with good response, and Warfarin without concerning side effects.      ROS:  ROS findings as noted in HPI.        Review of Systems   Constitutional:  Negative for fatigue and unexpected weight change.   Respiratory:  Negative for chest tightness and shortness of breath.    Cardiovascular:  Negative for chest pain, palpitations and leg swelling.   Gastrointestinal:  Negative for abdominal pain.   Musculoskeletal:  Negative for arthralgias.   Neurological:  Negative for dizziness, syncope, light-headedness and headaches.      Relevant History:  GI Dr. Medrano colonoscopy 2018 no polyps on 10 year schedule  Egd 2018 2 gastric polyps ,gastritis , small hiatal hernia    Heme/onc h/o iron def. Used to donate blood.  Had full GI work up 2018 egd, colonoscopy and video capsule endoscopy neg     Nephrology Dr. Howard  microalbuminura    Ent Dr. Joya C/o mild jeremias hearing loss SNHL      Pulm  ELLIOT  on CPAP now.  Using consistently. Feels better rested and less likely to fall asleep during day     Endocrine A1c 5.4-diet controlled mild glucose intolerance  Lipids  at goal   urine ma elevated -on ARB     Eye dr. Dawn 5/24     Urology Dr. Gibbs/now [facility]  monitoring for mild LUTS due to BPH .  Has occ urgency and mild urge incontinence. Feels he empties well. Urinates once at night. Nl stream. Nl hesitancy. PSA mildly elevated, 6/23 5.6  .  Now s/p biopsy 3/22/22- 1 specimens - all benign  With some chronic inflammation. Mri prostate 7/23 Severely enlarged prostate gland with features of benign prostatic hyperplasia.  No suspicious focal lesion.         Card HTN- BP has been low 110s/60s. HCTZ now stopped. BP running < 130/80 consistently on digital htn 20% but < 140/90 91% of time  Objective:      Physical Exam  Vitals and nursing note reviewed.   Constitutional:       Appearance: He is well-developed.   Cardiovascular:      Rate and Rhythm: Normal rate and regular rhythm.      Heart sounds: Normal heart sounds.   Pulmonary:      Effort: Pulmonary effort is normal.      Breath sounds: Normal breath sounds.   Skin:     General: Skin is warm and dry.   Neurological:      Mental Status: He is alert and oriented to person, place, and time.         Assessment:       ICD-10-CM ICD-9-CM    1. Hypertension, unspecified type  I10 401.9       2. Hyperlipidemia, unspecified hyperlipidemia type  E78.5 272.4       3. Positive for microalbuminuria  R80.9 791.0 Urinalysis, Reflex to Urine Culture Urine, Clean Catch      4. Iron deficiency anemia, unspecified iron deficiency anemia type  D50.9 280.9       5. BPH with elevated PSA  N40.0 600.00     R97.20 790.93       6. Hyperglycemia  R73.9 790.29 Urinalysis, Reflex to Urine Culture Urine, Clean Catch      7. ELLIOT on CPAP  G47.33 327.23       8. Annual physical exam  Z00.00 V70.0       9.  Sensorineural hearing loss (SNHL) of both ears  H90.3 389.18          Plan:   1. Hypertension, unspecified type (Primary)  Controlled on current medications.  Continue current medications.      2. Hyperlipidemia, unspecified hyperlipidemia type  Stable condition.  Continue current medications.  Will adjust based on lab findings or if condition changes.      3. Positive for microalbuminuria  Screen and treat as indicated:    - Urinalysis, Reflex to Urine Culture Urine, Clean Catch; Future    4. Iron deficiency anemia, unspecified iron deficiency anemia type  Screen and treat as indicated:      5. BPH with elevated PSA  Cont current mgmt and urology monitoring    6. Hyperglycemia   Your blood sugar is borderline high.  This means you are at risk for developing type 2 diabetes mellitus.  To lessen your risk you should exercise regularly, avoid excess carbohydrates and work toward a body mass index of less than 25.      - Urinalysis, Reflex to Urine Culture Urine, Clean Catch; Future    7. ELLIOT on CPAP  Cont use of cpap and monitoring    8. Annual physical exam  Screen and treat as indicated:      9. Sensorineural hearing loss (SNHL) of both ears  Cont ent monitoring and treat as indicated  Assessment & Plan    - Discussed the importance of proper oyster handling and consumption to prevent foodborne illness.  - Mr. Juarez to choose reputable establishments with proper storage and preparation practices when consuming oysters.  - Explained the potential benefits of exercise for tremor control, muscle tone, and stress reduction.  - Recommend increasing exercise, particularly gym attendance, for improved muscle tone and strength.  - Mr. Juarez to limit caffeine intake to help manage tremors.  - Mr. Juarez to continue using CPAP therapy nightly for sleep apnea management.  - Continued Celexa, Wellbutrin, and Jardiance at current doses.  - Ordered urinalysis with culture reflex, microalbumin test, CBC, CMP, Hemoglobin A1c, lipid  panel, iron panel, and PSA test (to be linked with existing order).  - Follow up in 6 months.  - Contact the office if tremors worsen, interfere with function, or if there are changes in blood pressure or heart rate.         Time spent with patient: 20 minutes  Patient with be reevaluated in 6 months or sooner prn  Greater than 50% of this visit was spent counseling as described in above documentation:Yes  This note was generated with the assistance of ambient listening technology. Verbal consent was obtained by the patient and accompanying visitor(s) for the recording of patient appointment to facilitate this note. I attest to having reviewed and edited the generated note for accuracy, though some syntax or spelling errors may persist. Please contact the author of this note for any clarification.            [1]   Patient Active Problem List  Diagnosis    Hyperlipidemia    Good hypertension control    Exercise-induced asthma    Positive for microalbuminuria    Hyperglycemia    Anemia    NICCI (iron deficiency anemia)    ADHD (attention deficit hyperactivity disorder), combined type    Anxiety    BPH with elevated PSA    Chronic low back pain without sciatica

## 2025-03-14 ENCOUNTER — LAB VISIT (OUTPATIENT)
Dept: LAB | Facility: HOSPITAL | Age: 65
End: 2025-03-14
Attending: NURSE PRACTITIONER
Payer: COMMERCIAL

## 2025-03-14 DIAGNOSIS — N40.0 BPH WITH ELEVATED PSA: ICD-10-CM

## 2025-03-14 DIAGNOSIS — R97.20 BPH WITH ELEVATED PSA: ICD-10-CM

## 2025-03-14 DIAGNOSIS — Z13.1 DIABETES MELLITUS SCREENING: ICD-10-CM

## 2025-03-14 DIAGNOSIS — E78.5 HYPERLIPIDEMIA, UNSPECIFIED HYPERLIPIDEMIA TYPE: ICD-10-CM

## 2025-03-14 DIAGNOSIS — I10 HYPERTENSION, UNSPECIFIED TYPE: ICD-10-CM

## 2025-03-14 DIAGNOSIS — D50.9 IRON DEFICIENCY ANEMIA, UNSPECIFIED IRON DEFICIENCY ANEMIA TYPE: ICD-10-CM

## 2025-03-14 LAB
ALBUMIN SERPL BCP-MCNC: 4.3 G/DL (ref 3.5–5.2)
ALP SERPL-CCNC: 85 U/L (ref 40–150)
ALT SERPL W/O P-5'-P-CCNC: 31 U/L (ref 10–44)
ANION GAP SERPL CALC-SCNC: 10 MMOL/L (ref 8–16)
AST SERPL-CCNC: 23 U/L (ref 10–40)
BASOPHILS # BLD AUTO: 0.03 K/UL (ref 0–0.2)
BASOPHILS NFR BLD: 0.6 % (ref 0–1.9)
BILIRUB SERPL-MCNC: 0.8 MG/DL (ref 0.1–1)
BUN SERPL-MCNC: 20 MG/DL (ref 8–23)
CALCIUM SERPL-MCNC: 10 MG/DL (ref 8.7–10.5)
CHLORIDE SERPL-SCNC: 103 MMOL/L (ref 95–110)
CHOLEST SERPL-MCNC: 176 MG/DL (ref 120–199)
CHOLEST/HDLC SERPL: 2.7 {RATIO} (ref 2–5)
CO2 SERPL-SCNC: 25 MMOL/L (ref 23–29)
CREAT SERPL-MCNC: 1.2 MG/DL (ref 0.5–1.4)
DIFFERENTIAL METHOD BLD: NORMAL
EOSINOPHIL # BLD AUTO: 0.1 K/UL (ref 0–0.5)
EOSINOPHIL NFR BLD: 2.6 % (ref 0–8)
ERYTHROCYTE [DISTWIDTH] IN BLOOD BY AUTOMATED COUNT: 12.7 % (ref 11.5–14.5)
EST. GFR  (NO RACE VARIABLE): >60 ML/MIN/1.73 M^2
ESTIMATED AVG GLUCOSE: 111 MG/DL (ref 68–131)
FERRITIN SERPL-MCNC: 42 NG/ML (ref 20–300)
GLUCOSE SERPL-MCNC: 83 MG/DL (ref 70–110)
HBA1C MFR BLD: 5.5 % (ref 4–5.6)
HCT VFR BLD AUTO: 46.2 % (ref 40–54)
HDLC SERPL-MCNC: 65 MG/DL (ref 40–75)
HDLC SERPL: 36.9 % (ref 20–50)
HGB BLD-MCNC: 15.2 G/DL (ref 14–18)
IMM GRANULOCYTES # BLD AUTO: 0.01 K/UL (ref 0–0.04)
IMM GRANULOCYTES NFR BLD AUTO: 0.2 % (ref 0–0.5)
IRON SERPL-MCNC: 123 UG/DL (ref 45–160)
LDLC SERPL CALC-MCNC: 95.2 MG/DL (ref 63–159)
LYMPHOCYTES # BLD AUTO: 1.5 K/UL (ref 1–4.8)
LYMPHOCYTES NFR BLD: 29.3 % (ref 18–48)
MCH RBC QN AUTO: 30.9 PG (ref 27–31)
MCHC RBC AUTO-ENTMCNC: 32.9 G/DL (ref 32–36)
MCV RBC AUTO: 94 FL (ref 82–98)
MONOCYTES # BLD AUTO: 0.6 K/UL (ref 0.3–1)
MONOCYTES NFR BLD: 11.2 % (ref 4–15)
NEUTROPHILS # BLD AUTO: 2.9 K/UL (ref 1.8–7.7)
NEUTROPHILS NFR BLD: 56.1 % (ref 38–73)
NONHDLC SERPL-MCNC: 111 MG/DL
NRBC BLD-RTO: 0 /100 WBC
PLATELET # BLD AUTO: 249 K/UL (ref 150–450)
PMV BLD AUTO: 10.4 FL (ref 9.2–12.9)
POTASSIUM SERPL-SCNC: 4.9 MMOL/L (ref 3.5–5.1)
PROT SERPL-MCNC: 7.9 G/DL (ref 6–8.4)
RBC # BLD AUTO: 4.92 M/UL (ref 4.6–6.2)
SATURATED IRON: 29 % (ref 20–50)
SODIUM SERPL-SCNC: 138 MMOL/L (ref 136–145)
TOTAL IRON BINDING CAPACITY: 425 UG/DL (ref 250–450)
TRANSFERRIN SERPL-MCNC: 287 MG/DL (ref 200–375)
TRIGL SERPL-MCNC: 79 MG/DL (ref 30–150)
WBC # BLD AUTO: 5.08 K/UL (ref 3.9–12.7)

## 2025-03-14 PROCEDURE — 80053 COMPREHEN METABOLIC PANEL: CPT | Performed by: NURSE PRACTITIONER

## 2025-03-14 PROCEDURE — 36415 COLL VENOUS BLD VENIPUNCTURE: CPT | Mod: PO | Performed by: NURSE PRACTITIONER

## 2025-03-14 PROCEDURE — 84153 ASSAY OF PSA TOTAL: CPT | Performed by: NURSE PRACTITIONER

## 2025-03-14 PROCEDURE — 82728 ASSAY OF FERRITIN: CPT | Performed by: NURSE PRACTITIONER

## 2025-03-14 PROCEDURE — 83036 HEMOGLOBIN GLYCOSYLATED A1C: CPT | Performed by: NURSE PRACTITIONER

## 2025-03-14 PROCEDURE — 80061 LIPID PANEL: CPT | Performed by: NURSE PRACTITIONER

## 2025-03-14 PROCEDURE — 85025 COMPLETE CBC W/AUTO DIFF WBC: CPT | Performed by: NURSE PRACTITIONER

## 2025-03-14 PROCEDURE — 84466 ASSAY OF TRANSFERRIN: CPT | Performed by: NURSE PRACTITIONER

## 2025-03-17 ENCOUNTER — LAB VISIT (OUTPATIENT)
Dept: LAB | Facility: HOSPITAL | Age: 65
End: 2025-03-17
Attending: INTERNAL MEDICINE
Payer: COMMERCIAL

## 2025-03-17 ENCOUNTER — RESULTS FOLLOW-UP (OUTPATIENT)
Dept: FAMILY MEDICINE | Facility: CLINIC | Age: 65
End: 2025-03-17

## 2025-03-17 ENCOUNTER — TELEPHONE (OUTPATIENT)
Dept: FAMILY MEDICINE | Facility: CLINIC | Age: 65
End: 2025-03-17
Payer: COMMERCIAL

## 2025-03-17 DIAGNOSIS — R80.9 POSITIVE FOR MICROALBUMINURIA: ICD-10-CM

## 2025-03-17 LAB
ALBUMIN/CREAT UR: 252.8 UG/MG (ref 0–30)
CREAT UR-MCNC: 36 MG/DL (ref 23–375)
MICROALBUMIN UR DL<=1MG/L-MCNC: 91 UG/ML
PROSTATE SPECIFIC ANTIGEN, TOTAL: 5.1 NG/ML (ref 0–4)
PROSTATE SPECIFIC ANTIGEN, TOTAL: 5.1 NG/ML (ref 0–4)
PSA FREE MFR SERPL: 34.12 %
PSA FREE MFR SERPL: 34.12 %
PSA FREE SERPL-MCNC: 1.74 NG/ML (ref 0–1.5)
PSA FREE SERPL-MCNC: 1.74 NG/ML (ref 0–1.5)

## 2025-03-17 PROCEDURE — 82570 ASSAY OF URINE CREATININE: CPT | Performed by: INTERNAL MEDICINE

## 2025-03-17 NOTE — TELEPHONE ENCOUNTER
Patient c/o low BS 70 in middle of night 3 am last 2 nights on jardiance 10mg. Told to d/c the med and monitor

## 2025-03-19 ENCOUNTER — TELEPHONE (OUTPATIENT)
Dept: DIABETES | Facility: CLINIC | Age: 65
End: 2025-03-19
Payer: COMMERCIAL

## 2025-04-09 ENCOUNTER — OFFICE VISIT (OUTPATIENT)
Dept: UROLOGY | Facility: CLINIC | Age: 65
End: 2025-04-09
Payer: COMMERCIAL

## 2025-04-09 DIAGNOSIS — R97.20 BPH WITH ELEVATED PSA: Primary | ICD-10-CM

## 2025-04-09 DIAGNOSIS — R33.9 INCOMPLETE BLADDER EMPTYING: ICD-10-CM

## 2025-04-09 DIAGNOSIS — N40.0 BPH WITH ELEVATED PSA: Primary | ICD-10-CM

## 2025-04-09 LAB — POC RESIDUAL URINE VOLUME: 1 ML (ref 0–100)

## 2025-04-09 PROCEDURE — 3044F HG A1C LEVEL LT 7.0%: CPT | Mod: CPTII,S$GLB,, | Performed by: NURSE PRACTITIONER

## 2025-04-09 PROCEDURE — 51798 US URINE CAPACITY MEASURE: CPT | Mod: S$GLB,,, | Performed by: NURSE PRACTITIONER

## 2025-04-09 PROCEDURE — 1159F MED LIST DOCD IN RCRD: CPT | Mod: CPTII,S$GLB,, | Performed by: NURSE PRACTITIONER

## 2025-04-09 PROCEDURE — 3060F POS MICROALBUMINURIA REV: CPT | Mod: CPTII,S$GLB,, | Performed by: NURSE PRACTITIONER

## 2025-04-09 PROCEDURE — 99999 PR PBB SHADOW E&M-EST. PATIENT-LVL III: CPT | Mod: PBBFAC,,, | Performed by: NURSE PRACTITIONER

## 2025-04-09 PROCEDURE — 1160F RVW MEDS BY RX/DR IN RCRD: CPT | Mod: CPTII,S$GLB,, | Performed by: NURSE PRACTITIONER

## 2025-04-09 PROCEDURE — 4010F ACE/ARB THERAPY RXD/TAKEN: CPT | Mod: CPTII,S$GLB,, | Performed by: NURSE PRACTITIONER

## 2025-04-09 PROCEDURE — 3066F NEPHROPATHY DOC TX: CPT | Mod: CPTII,S$GLB,, | Performed by: NURSE PRACTITIONER

## 2025-04-09 PROCEDURE — 99213 OFFICE O/P EST LOW 20 MIN: CPT | Mod: S$GLB,,, | Performed by: NURSE PRACTITIONER

## 2025-04-09 NOTE — PROGRESS NOTES
[facility] Note  Staff: DENI Daniels    PCP: NANDA Styles    Chief Complaint: Routine Follow-up; BPH with elevated PSA levels.    Subjective:        HPI: Parth Juarez is a 65 y.o. male presents today for f/up with hx of BPH, LUTS, and elevated PSA levels.  Pt is a former pt of Dr. Corey Stoner.     PMHx: HTN, HLD, NICCI, ADD. 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 No famHx CaP but father had bladder ca  When psa crispin on 3/20/16 to 4.3 he was placed on finasteride 5mg daily and repeat psa 10/11/16 was 2.8 (=5.6)  When he was seen again in 2018 by Dr Gibbs it was noted : Dr Styels put the patient on finasteride 5 mg daily with some improvement in LUTS and last PSA 3/21/2018 and was 4.4 and this was before he started taking finasteride. Repeat psa 9/19/18 was 3.1 (=6.2)  Last saw Dr Gibbs 2/2020 noting progressive LUTS with urgency, frequency, PV dribble though also on diuretic and NTF x0     On my evaluation last year, noted after diuretic more frequency/urgency. Better somewhat after decreasing dose from 25 to 12.5  Felt like wasn't emptying as well when starting wellbutrin. Has been on finasteride monotherapy only in past, but stopped it completely given contraindication to blood donation PSA in 2009 at biopsy was 3.7, age adjusted elevation. Early in AM does have some UUI. Even tried shield pad. Occ AMs if cant make it on time   Heavy coffee in AM, not much during day, no soft drink. Metamucil daily to prevent constipation bc was a problem  SANDY 35-40g benign. Started flomax with conservative recs for urgency frequency      Cysto/Prostate Biopsy 3/22/22 (of note took his flomax at 4am and BP was 90s/50s on triage). PSA: 6.3, VOLUME: 83.9cc  U/S:  scattered punctate hypoechoities, no median lobe, normal SVs; CYSTO: significant kissing Lateral lobe obstruction centrally and distally, more anterolateral proximally though asymm R > L,  with minimal intravesical extension without median lobe. Mild scattered trabeculations   PATH: 14 cores BPH  On bx follow up noted that now on flomax, having stopped his diuretic, his BP is controlled and his urgency frequency have largely resolved. Decd caffeine some.   AUA SS 1/0, delighted (1 for frequency.)      11/2/22: AUASS: 4/0 (Urgency- 2; Weak Stream- 1; Sleeping- 1). PVR 0cc; Uroflow: voided 128cc in 16s with Qm 16.7cc/s, Qa 8.4cc/s, 2 intervals  Uroflow reviewed with nonobstructed pattern and efficient voiding curve     7/17/2023 OV with MD:    Reference Range  01/26/21 08:08 02/04/22 12:22 10/04/22 07:13 06/21/23 16:23   PSA, Screen 0.00 - 4.00  3.5 6.3 (H)       PSA Total 0.00 - 4.00      3.1 5.6 (H)   PSA, Free 0.00 - 1.50      1.11 2.04 (H)   PSA, Free % Not est %     35.81 36.43   Had recent follow up with Dr Styles 6/21/23 noting he is now on CPAP (Bubba) for his ELLIOT, BP had been low so HCTZ stopped and BP remained at goal, and noted monitoring for mild LUTS due to BPH .  Has occ urgency and mild urge incontinence. Feels he empties well. Urinates once at night. Nl stream. Nl hesitancy.   Noted last psa elevation and biopsy with bph/inflammation and PSA free/total repeated and forwarded as above  On review of PSA, got follow up MRI prostate prior to reeval  MRI prostate 7/12/23: 87g pirad2  6/14/23 HbA1c 5.3, Cr 1.1, eGFR >60  He reports AUA SS: 5/0, delighted.  Urgency has improved, increases with caffeine, which he has decreased.  Nocturia down to 0, and did at CPAP recently as above.  He has been exercising, and feels well.  PVR 14 cc by bladder scan.     MRI of the Prostate was  "completed on 7/12/2023:  IMPRESSION:  Severely enlarged prostate gland with features of benign prostatic hyperplasia.  No suspicious focal lesion.     Overall Assessment:  PIRADS 2 (clinically significant cancer is unlikely to be present)  Number of targets created for potential MR/US fusion biopsy  Peripheral zone: 0  Transition zone: 0     Per Dr. Stoner results overview  on 7/16  "Seems your very large prostate is still the most likely reason for your psa elevation given 87g volume, and free psa >30%."  Again reviewed that he was previously on finasteride monotherapy, with near doubling of prostate volume from 2009 to present, and despite PSA elevation, given negative prostate biopsy, very normal free PSA greater than 30% which is most consistent with BPH, and negative/PI-rad 2 MRI, no concern for underlying malignancy, and attribute all to BPH at this time.      OV w/ Nikia Boss, FNP 8/13/24:  Pt presents today for annual follow-up:  UA upon arrival to clinic showed trace protein, 5.5, 1.015  PVR is 108 mL*  Pt denies dysuria and gross hematuria today.  Pt does admit to some incontinence  Currently takes Flomax 0.4 mg one capsule daily with no problems.  Was on Finasteride in the past but states today he stopped due to monitoring process with his elevated PSA levels in the past.  Father had hx bladder cancer.  Started CPAP machine less than one year ago and as a result pt has noticed improvement in LUTS since starting machine.    NP-O'Aruna  4/9/25:  Pt arrives into office today for routine f/up.  After last OV, we increased his Flomax to 0.8 mg po daily.  Pt states today, overall his LUTS have improved greatly since the adjustment of the medication.  Pt urinated upon arrival into office.  PVR by bladder scan after is 1 mL which is greatly improved since last visit.  +Hx of Sleep Apnea-wears device at night.  Has no nocturia issues.  No dysuria, no gross hematuria.             Component  Ref Range & Units " (hover) 3 wk ago  (3/14/25) 3 wk ago  (3/14/25) 7 mo ago  (9/10/24) 7 mo ago  (8/13/24) 1 yr ago  (6/21/23) 2 yr ago  (10/4/22)   PSA Total 5.1 High  5.1 High  CM 4.9 High  CM 5.7 High  CM 5.6 High  CM 3.1 CM        REVIEW OF SYSTEMS:  A comprehensive 10 system review was performed and is negative except as noted above in HPI    PMHx:  Past Medical History:   Diagnosis Date    ADD (attention deficit disorder)     Asthma     exercise induced    BPH (benign prostatic hyperplasia)     Elevated PSA     Hyperlipidemia     Hypertension      PSHx:  Past Surgical History:   Procedure Laterality Date    BREAST BIOPSY      COLONOSCOPY  [id number]    COLONOSCOPY N/A 4/11/2018    Procedure: COLONOSCOPY;  Surgeon: Vikram Medrano MD;  Location: [facility];  Service: Endoscopy;  Laterality: N/A;    CYSTOSCOPY N/A 3/22/2022    Procedure: CYSTOSCOPY;  Surgeon: Corey Stoner MD;  [facility]: [facility];  Service: Urology;  Laterality: N/A;    PTERYGIUM EXCISION W/ GRAFT      bilateral    removal of colon polyps      TRANSRECTAL BIOPSY OF PROSTATE WITH ULTRASOUND GUIDANCE N/A 3/22/2022    Procedure: BIOPSY, PROSTATE, RECTAL APPROACH, WITH US GUIDANCE;  Surgeon: Corey Stoner MD;  Location: [facility];  Service: Urology;  Laterality: N/A;       Allergies:  No known drug allergies    Medications: reviewed     Objective:   There were no vitals filed for this visit.    General:WDWN in NAD  Eyes: PERRLA, normal conjunctiva  Respiratory: no increased work on breathing, clear to auscultation  Cardiovascular: regular rate and rhythm. No obvious extremity edema.  GI: palpation of masses. No tenderness. No hepatosplenomegaly to palpation.  Musculoskeletal: normal range of motion of bilateral upper extremities. Normal muscle strength and tone.  Skin: no obvious rashes or lesions. No tightening of skin noted.  Neurologic: CN grossly normal. Normal sensation.   Psychiatric: awake, alert and oriented x 3. Mood and affect normal.  Cooperative.  Assessment:       1. BPH with elevated PSA          Plan:     Reiterated during ov with pt today that his PSA is stable, free psa still normal >30%, and normal psa density for your >80g gland.    Continue Flomax 0.8 mg po daily at this time.     F/u with Dr. Vivienne James in 6 months to re-establish with alternative Urologist at that time.  PSA, Total and Free to be repeated 1-2 weeks prior to next ov with MD.      Nikia Boss, FNP-C

## 2025-04-11 ENCOUNTER — TELEPHONE (OUTPATIENT)
Dept: HEMATOLOGY/ONCOLOGY | Facility: CLINIC | Age: 65
End: 2025-04-11
Payer: COMMERCIAL

## 2025-04-11 DIAGNOSIS — F90.2 ADHD (ATTENTION DEFICIT HYPERACTIVITY DISORDER), COMBINED TYPE: Primary | ICD-10-CM

## 2025-04-14 ENCOUNTER — PATIENT MESSAGE (OUTPATIENT)
Dept: FAMILY MEDICINE | Facility: CLINIC | Age: 65
End: 2025-04-14
Payer: COMMERCIAL

## 2025-04-22 ENCOUNTER — TELEPHONE (OUTPATIENT)
Dept: FAMILY MEDICINE | Facility: CLINIC | Age: 65
End: 2025-04-22
Payer: COMMERCIAL

## 2025-04-22 DIAGNOSIS — R41.3 MEMORY PROBLEM: Primary | ICD-10-CM

## 2025-04-22 DIAGNOSIS — R41.3 OTHER AMNESIA: ICD-10-CM

## 2025-04-22 NOTE — TELEPHONE ENCOUNTER
Spoke with patient regarding neuropsych testing and results showing mild cognitive impairment.  Would like referral to neurology and labs and mri brain to start work up for memory impairment. Orders placed

## 2025-04-23 ENCOUNTER — TELEPHONE (OUTPATIENT)
Dept: NEUROLOGY | Facility: CLINIC | Age: 65
End: 2025-04-23
Payer: COMMERCIAL

## 2025-04-23 NOTE — TELEPHONE ENCOUNTER
----- Message from Matthew sent at 4/23/2025  4:29 PM CDT -----  Contact: Self  Type:  Sooner Appointment RequestCaller is requesting a sooner appointment.  Caller declined first available appointment listed below.  Caller will not accept being placed on the waitlist and is requesting a message be sent to doctor.Name of Caller:  PatientWhen is the first available appointment?  N/aSymptoms:  R41.3 (ICD-10-CM) - Memory problemWould the patient rather a call back or a response via [facility]? [employer] Call Back Number:  [phone] Additional Information: getting MRI 4/28  ----- Message -----  From: Matthew Ricketts  Sent: 4/23/2025   4:29 PM CDT  To: Glenn Hernández Staff    Type:  Sooner Appointment RequestCaller is requesting a sooner appointment.  Caller declined first available appointment listed below.  Caller will not accept being placed on the waitlist and is requesting a message be sent to doctor.Name of Caller:  PatientWhen is the first available appointment?  N/aSymptoms:  R41.3 (ICD-10-CM) - Memory problemWould the patient rather a call back or a response via [facility]? [employer] Call Back Number:  [phone] Additional Information:

## 2025-04-24 ENCOUNTER — LAB VISIT (OUTPATIENT)
Dept: LAB | Facility: HOSPITAL | Age: 65
End: 2025-04-24
Attending: INTERNAL MEDICINE
Payer: COMMERCIAL

## 2025-04-24 DIAGNOSIS — R41.3 MEMORY PROBLEM: ICD-10-CM

## 2025-04-24 PROBLEM — R41.840 ATTENTION AND CONCENTRATION DEFICIT: Status: ACTIVE | Noted: 2025-04-24

## 2025-04-24 LAB
(HCYS)2 SERPL-MCNC: 8 UMOL/L (ref 4–16.5)
25(OH)D3+25(OH)D2 SERPL-MCNC: 49 NG/ML (ref 30–96)
CERULOPLASMIN SERPL-MCNC: 24 MG/DL (ref 15–45)
CRP SERPL-MCNC: 0.5 MG/L
ERYTHROCYTE [SEDIMENTATION RATE] IN BLOOD: 0 MM/HR
HIV 1+2 AB+HIV1 P24 AG SERPL QL IA: NORMAL
T3FREE SERPL-MCNC: 83 NG/DL (ref 60–180)
T4 SERPL-MCNC: 7 UG/DL (ref 4.5–11.5)
TESTOST SERPL-MCNC: 563 NG/DL (ref 304–1227)
TSH SERPL-ACNC: 1.59 UIU/ML (ref 0.4–4)

## 2025-04-24 PROCEDURE — 82306 VITAMIN D 25 HYDROXY: CPT

## 2025-04-24 PROCEDURE — 86225 DNA ANTIBODY NATIVE: CPT

## 2025-04-24 PROCEDURE — 86800 THYROGLOBULIN ANTIBODY: CPT | Mod: PO

## 2025-04-24 PROCEDURE — 86235 NUCLEAR ANTIGEN ANTIBODY: CPT | Mod: 59

## 2025-04-24 PROCEDURE — 82390 ASSAY OF CERULOPLASMIN: CPT

## 2025-04-24 PROCEDURE — 86618 LYME DISEASE ANTIBODY: CPT | Mod: PO

## 2025-04-24 PROCEDURE — 86140 C-REACTIVE PROTEIN: CPT

## 2025-04-24 PROCEDURE — 84436 ASSAY OF TOTAL THYROXINE: CPT

## 2025-04-24 PROCEDURE — 83090 ASSAY OF HOMOCYSTEINE: CPT

## 2025-04-24 PROCEDURE — 86039 ANTINUCLEAR ANTIBODIES (ANA): CPT

## 2025-04-24 PROCEDURE — 82525 ASSAY OF COPPER: CPT | Mod: PO

## 2025-04-24 PROCEDURE — 84480 ASSAY TRIIODOTHYRONINE (T3): CPT

## 2025-04-24 PROCEDURE — 84403 ASSAY OF TOTAL TESTOSTERONE: CPT

## 2025-04-24 PROCEDURE — 86235 NUCLEAR ANTIGEN ANTIBODY: CPT

## 2025-04-24 PROCEDURE — 87389 HIV-1 AG W/HIV-1&-2 AB AG IA: CPT

## 2025-04-24 PROCEDURE — 36415 COLL VENOUS BLD VENIPUNCTURE: CPT | Mod: PO

## 2025-04-24 PROCEDURE — 85651 RBC SED RATE NONAUTOMATED: CPT | Mod: PO

## 2025-04-24 PROCEDURE — 86255 FLUORESCENT ANTIBODY SCREEN: CPT | Mod: 59,PO

## 2025-04-24 PROCEDURE — 82300 ASSAY OF CADMIUM: CPT | Mod: PO

## 2025-04-24 PROCEDURE — 84443 ASSAY THYROID STIM HORMONE: CPT

## 2025-04-25 ENCOUNTER — PATIENT MESSAGE (OUTPATIENT)
Dept: NEPHROLOGY | Facility: CLINIC | Age: 65
End: 2025-04-25
Payer: COMMERCIAL

## 2025-04-25 DIAGNOSIS — R80.9 POSITIVE FOR MICROALBUMINURIA: Primary | ICD-10-CM

## 2025-04-25 LAB
ANA (OHS): POSITIVE
ANA PATTERN 1 (OHS): ABNORMAL
ANA PATTERN 2 (OHS): ABNORMAL
ANA TITER 1 (OHS): ABNORMAL
ANA TITER 2 (OHS): ABNORMAL

## 2025-04-26 LAB
ADDRESS: NORMAL
ARSENIC BLD-MCNC: <1 NG/ML
ATTENDING PHYSICIAN NAME: NORMAL
CADMIUM BLD-MCNC: 0.5 NG/ML
COUNTY OF RESIDENCE: NORMAL
EMPLOYER NAME: NORMAL
ENDOCRINOLOGIST REVIEW: NORMAL
FACILITY PHONE #: NORMAL
HX OF OCCUPATION: NORMAL
LEAD BLDV-MCNC: 1.7 MCG/DL
M HEALTH CARE PROVIDER PHONE: NORMAL
M PATIENT CITY: NORMAL
MERCURY BLD-MCNC: 1 NG/ML
PHONE #: NORMAL
PROVIDER CITY: NORMAL
PROVIDER POSTAL CODE: NORMAL
PROVIDER STATE: NORMAL
REFER PHYSICIAN ADDR: NORMAL
SPECIMEN SOURCE: NORMAL
THYROGLOB AB SERPL IA-ACNC: <1.8 IU/ML
THYROGLOB SERPL-MCNC: 6.7 NG/ML

## 2025-04-28 ENCOUNTER — HOSPITAL ENCOUNTER (OUTPATIENT)
Dept: RADIOLOGY | Facility: HOSPITAL | Age: 65
Discharge: HOME | End: 2025-04-28
Attending: FAMILY MEDICINE
Payer: COMMERCIAL

## 2025-04-28 VITALS — BODY MASS INDEX: 28.19 KG/M2 | WEIGHT: 180 LBS

## 2025-04-28 DIAGNOSIS — R41.3 MEMORY PROBLEM: ICD-10-CM

## 2025-04-28 DIAGNOSIS — R41.3 OTHER AMNESIA: ICD-10-CM

## 2025-04-28 LAB
DSDNA ANTIBODY (OHS): NORMAL
DSDNA ANTIBODY TITER (OHS): NORMAL
SM  ANTIBODY (OHS): 0.11 RATIO
SM INTERPRETATION (OHS): NEGATIVE
SM/RNP ANTIBODY (OHS): 0.08 RATIO
SM/RNP INTERPRETATION (OHS): NEGATIVE
SSA  ANTIBODY (OHS): 0.13 RATIO (ref 0–0.99)
SSA INTERPRETATION (OHS): NEGATIVE
SSB  ANTIBODY (OHS): 0.08 RATIO
SSB INTERPRETATION (OHS): NEGATIVE
W BORRELIA BURGDORFERI TOTAL IGG/IGM ANTIBODY WITH REFLEX: 0.16 INDEX

## 2025-04-28 PROCEDURE — 70553 MRI BRAIN STEM W/O & W/DYE: CPT | Mod: TC

## 2025-04-28 PROCEDURE — 25500020 PHARM REV CODE 255: Performed by: FAMILY MEDICINE

## 2025-04-28 PROCEDURE — A9585 GADOBUTROL INJECTION: HCPCS | Performed by: FAMILY MEDICINE

## 2025-04-28 PROCEDURE — 70553 MRI BRAIN STEM W/O & W/DYE: CPT | Mod: 26,,, | Performed by: RADIOLOGY

## 2025-04-28 RX ORDER — GADOBUTROL 604.72 MG/ML
8 INJECTION INTRAVENOUS
Status: COMPLETED | OUTPATIENT
Start: 2025-04-28 | End: 2025-04-28

## 2025-04-28 RX ADMIN — GADOBUTROL 8 ML: 604.72 INJECTION INTRAVENOUS at 08:04

## 2025-04-29 ENCOUNTER — PATIENT MESSAGE (OUTPATIENT)
Dept: NEPHROLOGY | Facility: CLINIC | Age: 65
End: 2025-04-29
Payer: COMMERCIAL

## 2025-04-29 LAB — W COPPER: 981 UG/L

## 2025-04-30 ENCOUNTER — TELEPHONE (OUTPATIENT)
Dept: FAMILY MEDICINE | Facility: CLINIC | Age: 65
End: 2025-04-30
Payer: COMMERCIAL

## 2025-05-02 LAB
AMPHIPHYSIN IGG SER QL IA: NEGATIVE
ANNOTATION COMMENT IMP: NORMAL
CV2 AB SERPL QL IF: NEGATIVE
GLIAL NUC TYPE 1 AB SER QL IF: NEGATIVE
HU1 AB SER QL: NEGATIVE
HU2 AB SER QL IF: NEGATIVE
HU3 AB SER QL: NEGATIVE
PARANEOPLASTIC AB SER-IMP: NORMAL
PCA-1 AB SER QL IF: NEGATIVE
PCA-2 AB SER QL IF: NEGATIVE
PCA-TR AB SER QL IF: NEGATIVE

## 2025-05-07 ENCOUNTER — LAB VISIT (OUTPATIENT)
Dept: LAB | Facility: HOSPITAL | Age: 65
End: 2025-05-07
Attending: NURSE PRACTITIONER
Payer: COMMERCIAL

## 2025-05-07 ENCOUNTER — OFFICE VISIT (OUTPATIENT)
Dept: NEUROLOGY | Facility: CLINIC | Age: 65
End: 2025-05-07
Payer: COMMERCIAL

## 2025-05-07 VITALS
WEIGHT: 184.31 LBS | SYSTOLIC BLOOD PRESSURE: 111 MMHG | HEART RATE: 68 BPM | DIASTOLIC BLOOD PRESSURE: 71 MMHG | BODY MASS INDEX: 28.87 KG/M2

## 2025-05-07 DIAGNOSIS — R41.3 MEMORY PROBLEM: ICD-10-CM

## 2025-05-07 DIAGNOSIS — R41.840 ATTENTION AND CONCENTRATION DEFICIT: ICD-10-CM

## 2025-05-07 DIAGNOSIS — R41.3 MEMORY LOSS: Primary | ICD-10-CM

## 2025-05-07 LAB
FOLATE SERPL-MCNC: 37 NG/ML (ref 4–24)
T PALLIDUM IGG+IGM SER QL: NORMAL
VIT B12 SERPL-MCNC: 443 PG/ML (ref 210–950)

## 2025-05-07 PROCEDURE — 99205 OFFICE O/P NEW HI 60 MIN: CPT | Mod: S$GLB,,, | Performed by: NURSE PRACTITIONER

## 2025-05-07 PROCEDURE — 3060F POS MICROALBUMINURIA REV: CPT | Mod: CPTII,S$GLB,, | Performed by: NURSE PRACTITIONER

## 2025-05-07 PROCEDURE — 3044F HG A1C LEVEL LT 7.0%: CPT | Mod: CPTII,S$GLB,, | Performed by: NURSE PRACTITIONER

## 2025-05-07 PROCEDURE — 3074F SYST BP LT 130 MM HG: CPT | Mod: CPTII,S$GLB,, | Performed by: NURSE PRACTITIONER

## 2025-05-07 PROCEDURE — 86593 SYPHILIS TEST NON-TREP QUANT: CPT

## 2025-05-07 PROCEDURE — 3008F BODY MASS INDEX DOCD: CPT | Mod: CPTII,S$GLB,, | Performed by: NURSE PRACTITIONER

## 2025-05-07 PROCEDURE — 82607 VITAMIN B-12: CPT

## 2025-05-07 PROCEDURE — 4010F ACE/ARB THERAPY RXD/TAKEN: CPT | Mod: CPTII,S$GLB,, | Performed by: NURSE PRACTITIONER

## 2025-05-07 PROCEDURE — 84425 ASSAY OF VITAMIN B-1: CPT | Mod: PO

## 2025-05-07 PROCEDURE — 3066F NEPHROPATHY DOC TX: CPT | Mod: CPTII,S$GLB,, | Performed by: NURSE PRACTITIONER

## 2025-05-07 PROCEDURE — 99999 PR PBB SHADOW E&M-EST. PATIENT-LVL III: CPT | Mod: PBBFAC,,, | Performed by: NURSE PRACTITIONER

## 2025-05-07 PROCEDURE — 1126F AMNT PAIN NOTED NONE PRSNT: CPT | Mod: CPTII,S$GLB,, | Performed by: NURSE PRACTITIONER

## 2025-05-07 PROCEDURE — 82746 ASSAY OF FOLIC ACID SERUM: CPT

## 2025-05-07 PROCEDURE — 83921 ORGANIC ACID SINGLE QUANT: CPT | Mod: PO

## 2025-05-07 PROCEDURE — 1159F MED LIST DOCD IN RCRD: CPT | Mod: CPTII,S$GLB,, | Performed by: NURSE PRACTITIONER

## 2025-05-07 PROCEDURE — 3078F DIAST BP <80 MM HG: CPT | Mod: CPTII,S$GLB,, | Performed by: NURSE PRACTITIONER

## 2025-05-07 PROCEDURE — 36415 COLL VENOUS BLD VENIPUNCTURE: CPT | Mod: PO

## 2025-05-07 NOTE — Clinical Note
This is one of the PCPs in [place]. I would love your insight about his case when you have the time :)

## 2025-05-07 NOTE — ASSESSMENT & PLAN NOTE
Outside NP testing summary reviewed -- raw data is not included in the report, but notes indicate issues with working memory, attention/concentration & problem solving. 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   Reviewed vascular RF - HTN, HLD. These are well controlled.   He has ELLIOT and is CPAP compliant. Sleep does not sound to be problematic.   He has mild hearing loss and is aware of how this could affect cognition   Reviewed MRI brain w wo personally -- no significant or focal atrophy, minimal (age appropriate) microangiopathy, no GRE changes  Medication list reviewed and discussed -- no AC burden   Serologies to date reviewed -- negative overall    Plan:  Request raw data / additional information from previous NP testing. Will review with Dr. Wilks in our NP dept.   Discussed current biomarkers available -- namely AD and synucleinopathies. He has an action / postural tremor (?familial), but no other Pdism. Given that his clinical symptoms are not overly suggestive of either etiology, defer at this time given limitations.   ?underlying ADHD / vascular RF / English as SL / hearing loss making his aging brain more vulnerable to these symptoms vs. Onset of Neurodegenerative disease   No clear indication for AChEi -- await data above, also note that he is on Celexa and would be more at risk for arrhythmia. Memantine would be off label and would likely increase irritability / anxiety.
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
## 2025-05-08 ENCOUNTER — TELEPHONE (OUTPATIENT)
Dept: NEUROLOGY | Facility: CLINIC | Age: 65
End: 2025-05-08
Payer: COMMERCIAL

## 2025-05-08 NOTE — TELEPHONE ENCOUNTER
----- Message from Abbi Hernandez NP sent at 5/8/2025  2:03 PM CDT -----  [id number] [phone]  ----- Message -----  From: Elaine Vaz LPN  Sent: 5/8/2025  10:20 AM CDT  To: Abbi Hernandez NP    Does the summary have a phone number on it? The phone number online goes directly to [employer] and seems like it's a personal cell phone.  ----- Message -----  From: Abbi Hernandez NP  Sent: 5/7/2025   3:28 PM CDT  To: David BERRIOS Staff    Please contact [facility] in [place] He had NP testing done there - all we have is the summary. Please ask them to send the full report with all of the testing data ASAP  ----- Message -----  From: Mary Wilks, PhD  Sent: 5/7/2025   1:55 PM CDT  To: Abbi Hernandez NP    I'd be happy to look at the previous testing and talk about it with you. From the note, it sounds like you might not have it yet. Do you want to set up a time to talk?  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 I would love your insight about his case when you have the time :)

## 2025-05-08 NOTE — TELEPHONE ENCOUNTER
Called [facility] at [phone]. Left message requesting to have full report for NP testing faxed to clinic. Left phone number and fax number

## 2025-05-14 ENCOUNTER — E-VISIT (OUTPATIENT)
Dept: FAMILY MEDICINE | Facility: CLINIC | Age: 65
End: 2025-05-14
Payer: COMMERCIAL

## 2025-05-14 ENCOUNTER — RESULTS FOLLOW-UP (OUTPATIENT)
Dept: NEUROLOGY | Facility: CLINIC | Age: 65
End: 2025-05-14

## 2025-05-14 DIAGNOSIS — J06.9 UPPER RESPIRATORY TRACT INFECTION, UNSPECIFIED TYPE: ICD-10-CM

## 2025-05-14 DIAGNOSIS — J45.20 MILD INTERMITTENT ASTHMA WITHOUT COMPLICATION: Primary | ICD-10-CM

## 2025-05-14 DIAGNOSIS — F90.2 ADHD (ATTENTION DEFICIT HYPERACTIVITY DISORDER), COMBINED TYPE: Primary | ICD-10-CM

## 2025-05-14 LAB
W METHYLMALONIC ACID: 0.2 UMOL/L
W VITAMIN B1: 64 UG/L

## 2025-05-14 RX ORDER — FLUTICASONE PROPIONATE 220 UG/1
1 AEROSOL, METERED RESPIRATORY (INHALATION) 2 TIMES DAILY
Qty: 12 G | Refills: 11 | Status: SHIPPED | OUTPATIENT
Start: 2025-05-14 | End: 2026-05-14

## 2025-05-14 RX ORDER — ALBUTEROL SULFATE 90 UG/1
2 INHALANT RESPIRATORY (INHALATION) EVERY 6 HOURS PRN
Qty: 18 G | Status: SHIPPED | OUTPATIENT
Start: 2025-05-14

## 2025-05-14 NOTE — PROGRESS NOTES
Patient ID: Parth Juarez is a 65 y.o. male.    Chief Complaint: General Illness (Entered automatically based on patient selection in [employer].)    The patient initiated a request through [employer] on 5/14/2025 for evaluation and management with a chief complaint of General Illness (Entered automatically based on patient selection in [employer].)     I evaluated the questionnaire submission on 5/14/25.    [facility] Evisit [facility]-Cough And Cold    5/14/2025  3:02 PM CDT - Filed by Patient   What do you need help with? Sinus Infection   Do you agree to participate in an E-Visit? Yes   If you have any of the following symptoms, go to your local emergency room or call 911: I acknowledge   What is the main issue you would like addressed today? I have been with productive cough eith nocturnsl wheezes. I need inhates (Albuterol FDA AND Flovent or Pulmicort inha ). I have Astrepro , claritin and Zycam ,saline washes   Do you think you might have COVID-19 or the Flu? No   What symptoms do you currently have?  Cough;  Stuffy nose   Describe your cough: Comes and goes   Have you ever smoked? Never smoked   Do you have a fever? Below 100.4°   When did your concern begin? 5/12/2025   In the last two weeks, have you been in close contact with someone who has COVID-19, the Flu, or strep throat? No   What have you tried to help your symptoms? Cough syrup;  Drinking more fluids;  Gargled salt water;  Hot shower;  Rest and sleep;  Vitamin C   On a scale of 1-10, where 10 is the worst you can imagine, how severe are your symptoms? (range: 1 - 10) 3   How have your symptoms changed since they first started? Improved   Do you have transportation to an [facility] location to get tested for COVID-19? Yes   Provide any additional information you feel is important. I am requesting Albuterol an Flovent inhaler.   Please attach any relevant images or files    Are you able to take your vital signs? Yes   Systolic Blood Pressure: 127   Diastolic Blood  Pressure: 78   Weight: 175   Height: 67   Pulse: 67   Temperature: 98   Respiration rate:    Pulse Oxygen:          No diagnosis found.     No orders of the defined types were placed in this encounter.           No follow-ups on file.      E-Visit Time Trackin-10 min     1. Mild intermittent asthma without complication (Primary)  Use prn  - albuterol (PROVENTIL HFA) 90 mcg/actuation inhaler; Inhale 2 puffs into the lungs every 6 (six) hours as needed for Wheezing. Rescue  Dispense: 18 g; Refill: PRN  - fluticasone propionate (FLOVENT HFA) 220 mcg/actuation inhaler; Inhale 1 puff into the lungs 2 (two) times daily. Controller  Dispense: 12 g; Refill: 11    2. Upper respiratory tract infection, unspecified type  General home care guidelines for cough and congestion:increase fluid intake, get plenty of rest, and use OTC cough and cold medications for relief of symptoms.  I recommended guafenesin for congestion and dextromethorphan as directed for cough.  Brands like Mucinex DM or Chloricidin HBP or similar are recommended.  Avoidance of decongestants is recommended for patients with heart problems and hypertension.  Extra vitamin C may also benefit.  Return to clinic if symptoms last longer than 10 days or sooner if worsen with symptoms like fever > 100.4, severe sinus pain or headache, thick yellow nasal discharge or sputum, dehydration, sudden confusion or mental status changes, shortness of breath, labored breathing, or lethargy. Anti-fever medications can be alternated like OTC ibuprofen or tylenol as needed and as directed unless told to avoid these by your doctor.

## 2025-05-14 NOTE — Clinical Note
Herberth Jason.  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  He had a recent neuropsych eval indicating problems with working memory and quick pivots in urgent situations.  I am placing a referral and hope you can get him in.    Klaus, Betty

## 2025-05-14 NOTE — PROGRESS NOTES
Patient ID: Parth Jaurez is a 65 y.o. male.    Chief Complaint: General Illness (Entered automatically based on patient selection in [employer].)    The patient initiated a request through [employer] on 2025 for evaluation and management with a chief complaint of General Illness (Entered automatically based on patient selection in [employer].)     I evaluated the questionnaire submission on 25.    [facility]    2025  4:15 PM CDT - Filed by Patient   What do you need help with? Other Concern   Do you agree to participate in an E-Visit? Yes   If you have any of the following symptoms, please go to the nearest emergency room or call 911: I acknowledge   What is the main issue you would like addressed today? Concentration   Please describe your symptoms. Concentration   Where is your problem located? Concentration   On a scale of 1-10, where 10 is the worst you can imagine, how severe are your symptoms? (range: 1 - 10) 6   Have you had these symptoms before? Yes   How long have you had these symptoms? More than a month   What helps with your symptoms? Sleep   What makes your symptoms feel worse? Procrastination   Are your symptoms related to a condition you currently have? Yes   What condition do you currently have? ADHA   When were you last seen for this condition?    Provide any additional information you feel is important. Set a follow up appointment Dr mays   Please attach any relevant images or files    Are you able to take your vital signs? Yes   Systolic Blood Pressure: 127   Diastolic Blood Pressure: 70   Weight: 178   Height: 67   Pulse: 68   Temperature: 98   Respiration rate:    Pulse Oxygen:          No diagnosis found.     No orders of the defined types were placed in this encounter.           No follow-ups on file.      E-Visit Time Trackin-10 min     1. ADHD (attention deficit hyperactivity disorder), combined type (Primary)  refer  - Ambulatory referral/consult to  Psychiatry; Future

## 2025-05-15 ENCOUNTER — TELEPHONE (OUTPATIENT)
Dept: PSYCHIATRY | Facility: CLINIC | Age: 65
End: 2025-05-15
Payer: COMMERCIAL

## 2025-05-15 DIAGNOSIS — F41.9 ANXIETY: ICD-10-CM

## 2025-05-15 DIAGNOSIS — E78.5 HYPERLIPIDEMIA, UNSPECIFIED HYPERLIPIDEMIA TYPE: ICD-10-CM

## 2025-05-15 RX ORDER — CITALOPRAM 20 MG/1
20 TABLET ORAL
Qty: 90 TABLET | Refills: 3 | Status: SHIPPED | OUTPATIENT
Start: 2025-05-15

## 2025-05-15 RX ORDER — LOSARTAN POTASSIUM 100 MG/1
100 TABLET ORAL
Qty: 90 TABLET | Refills: 3 | Status: SHIPPED | OUTPATIENT
Start: 2025-05-15

## 2025-05-15 RX ORDER — ATORVASTATIN CALCIUM 40 MG/1
40 TABLET, FILM COATED ORAL
Qty: 90 TABLET | Refills: 3 | Status: SHIPPED | OUTPATIENT
Start: 2025-05-15

## 2025-05-15 NOTE — TELEPHONE ENCOUNTER
Refill Decision Note   Parth Juarez  is requesting a refill authorization.  Brief Assessment and Rationale for Refill:  Approve     Medication Therapy Plan:  LOV 3/13/25      Comments:     Note composed:12:31 PM 05/15/2025

## 2025-05-15 NOTE — TELEPHONE ENCOUNTER
No care due was identified.  [facility] Embedded Care Due Messages. Reference number: [id number].   5/15/2025 10:23:51 AM CDT

## 2025-05-15 NOTE — TELEPHONE ENCOUNTER
Pt called and left message needing to get into a visit with Dr Garza. Also we have received a referral for him to see medication provider. It has been awhile since patient was seen last.   Please advise when can get patient scheduled to have a re-establish visit with you.  Also the referral is stated for testing and evaluation for ADHD.  Please advise

## 2025-05-26 ENCOUNTER — PATIENT MESSAGE (OUTPATIENT)
Dept: PSYCHIATRY | Facility: CLINIC | Age: 65
End: 2025-05-26
Payer: COMMERCIAL

## 2025-05-28 ENCOUNTER — RESULTS FOLLOW-UP (OUTPATIENT)
Dept: FAMILY MEDICINE | Facility: CLINIC | Age: 65
End: 2025-05-28
Payer: COMMERCIAL

## 2025-05-28 DIAGNOSIS — R09.89 JUGULAR VENOUS DISTENSION: Primary | ICD-10-CM

## 2025-05-28 DIAGNOSIS — R76.8 ANTINUCLEAR ANTIBODY (ANA) POSITIVE: ICD-10-CM

## 2025-05-28 DIAGNOSIS — R41.3 MEMORY PROBLEM: ICD-10-CM

## 2025-06-04 ENCOUNTER — RESULTS FOLLOW-UP (OUTPATIENT)
Dept: FAMILY MEDICINE | Facility: CLINIC | Age: 65
End: 2025-06-04

## 2025-06-04 ENCOUNTER — HOSPITAL ENCOUNTER (OUTPATIENT)
Dept: CARDIOLOGY | Facility: HOSPITAL | Age: 65
Discharge: HOME | End: 2025-06-04
Attending: FAMILY MEDICINE
Payer: COMMERCIAL

## 2025-06-04 VITALS — HEIGHT: 67 IN | BODY MASS INDEX: 28.93 KG/M2 | WEIGHT: 184.31 LBS

## 2025-06-04 DIAGNOSIS — R09.89 JUGULAR VENOUS DISTENSION: ICD-10-CM

## 2025-06-04 LAB
AORTIC ROOT ANNULUS: 3.3 CM
AORTIC VALVE CUSP SEPERATION: 2.2 CM
APICAL FOUR CHAMBER EJECTION FRACTION: 62 %
APICAL TWO CHAMBER EJECTION FRACTION: 65 %
AV INDEX (PROSTH): 0.89
AV MEAN GRADIENT: 4 MMHG
AV PEAK GRADIENT: 8 MMHG
AV VALVE AREA BY VELOCITY RATIO: 3 CM²
AV VALVE AREA: 3.4 CM²
AV VELOCITY RATIO: 0.79
BSA FOR ECHO PROCEDURE: 1.99 M2
CV ECHO LV RWT: 0.39 CM
DOP CALC AO PEAK VEL: 1.4 M/S
DOP CALC AO VTI: 26.3 CM
DOP CALC LVOT AREA: 3.8 CM2
DOP CALC LVOT DIAMETER: 2.2 CM
DOP CALC LVOT PEAK VEL: 1.1 M/S
DOP CALC LVOT STROKE VOLUME: 88.5 CM3
DOP CALC MV VTI: 27.4 CM
DOP CALCLVOT PEAK VEL VTI: 23.3 CM
E WAVE DECELERATION TIME: 184 MSEC
E/A RATIO: 1.08
E/E' RATIO: 8 M/S
ECHO LV POSTERIOR WALL: 0.9 CM (ref 0.6–1.1)
FRACTIONAL SHORTENING: 39.1 % (ref 28–44)
INTERVENTRICULAR SEPTUM: 0.9 CM (ref 0.6–1.1)
IVC DIAMETER: 1.4 CM
IVRT: 60 MSEC
LEFT ATRIUM AREA SYSTOLIC (APICAL 2 CHAMBER): 19.4 CM2
LEFT ATRIUM AREA SYSTOLIC (APICAL 4 CHAMBER): 20.4 CM2
LEFT ATRIUM SIZE: 3.5 CM
LEFT ATRIUM VOLUME INDEX MOD: 29 ML/M2
LEFT ATRIUM VOLUME MOD: 56 ML
LEFT INTERNAL DIMENSION IN SYSTOLE: 2.8 CM (ref 2.1–4)
LEFT VENTRICLE DIASTOLIC VOLUME INDEX: 49.74 ML/M2
LEFT VENTRICLE DIASTOLIC VOLUME: 97 ML
LEFT VENTRICLE END DIASTOLIC VOLUME APICAL 2 CHAMBER: 103 ML
LEFT VENTRICLE END DIASTOLIC VOLUME APICAL 4 CHAMBER: 109 ML
LEFT VENTRICLE END SYSTOLIC VOLUME APICAL 2 CHAMBER: 56.9 ML
LEFT VENTRICLE END SYSTOLIC VOLUME APICAL 4 CHAMBER: 53.8 ML
LEFT VENTRICLE MASS INDEX: 70.6 G/M2
LEFT VENTRICLE SYSTOLIC VOLUME INDEX: 15.4 ML/M2
LEFT VENTRICLE SYSTOLIC VOLUME: 30 ML
LEFT VENTRICULAR INTERNAL DIMENSION IN DIASTOLE: 4.6 CM (ref 3.5–6)
LEFT VENTRICULAR MASS: 137.7 G
LV LATERAL E/E' RATIO: 7 M/S
LV SEPTAL E/E' RATIO: 9.3 M/S
LVED V (TEICH): 97.3 ML
LVES V (TEICH): 29.6 ML
LVOT MG: 3 MMHG
LVOT MV: 0.73 CM/S
MV MEAN GRADIENT: 2 MMHG
MV PEAK A VEL: 0.78 M/S
MV PEAK E VEL: 0.84 M/S
MV PEAK GRADIENT: 3 MMHG
MV VALVE AREA BY CONTINUITY EQUATION: 3.23 CM2
OHS CV RV/LV RATIO: 0.46 CM
OHS LV EJECTION FRACTION SIMPSONS BIPLANE MOD: 62 %
PV MV: 0.81 M/S
PV PEAK GRADIENT: 6 MMHG
PV PEAK VELOCITY: 1.18 M/S
RA PRESSURE ESTIMATED: 3 MMHG
RIGHT VENTRICLE DIASTOLIC BASEL DIMENSION: 2.1 CM
RIGHT VENTRICULAR END-DIASTOLIC DIMENSION: 2.1 CM
RV TISSUE DOPPLER FREE WALL SYSTOLIC VELOCITY 1 (APICAL 4 CHAMBER VIEW): 12.7 CM/S
TDI LATERAL: 0.12 M/S
TDI SEPTAL: 0.09 M/S
TDI: 0.11 M/S
TRICUSPID ANNULAR PLANE SYSTOLIC EXCURSION: 2.1 CM
Z-SCORE OF LEFT VENTRICULAR DIMENSION IN END DIASTOLE: -1.9
Z-SCORE OF LEFT VENTRICULAR DIMENSION IN END SYSTOLE: -1.58

## 2025-06-04 PROCEDURE — 93306 TTE W/DOPPLER COMPLETE: CPT | Mod: 26,,, | Performed by: INTERNAL MEDICINE

## 2025-06-04 PROCEDURE — 93306 TTE W/DOPPLER COMPLETE: CPT

## 2025-06-05 ENCOUNTER — OFFICE VISIT (OUTPATIENT)
Dept: PSYCHIATRY | Facility: CLINIC | Age: 65
End: 2025-06-05
Payer: COMMERCIAL

## 2025-06-05 VITALS
BODY MASS INDEX: 28.12 KG/M2 | HEART RATE: 62 BPM | WEIGHT: 179.56 LBS | SYSTOLIC BLOOD PRESSURE: 133 MMHG | DIASTOLIC BLOOD PRESSURE: 80 MMHG

## 2025-06-05 DIAGNOSIS — F43.20 ADJUSTMENT DISORDER, UNSPECIFIED TYPE: Primary | ICD-10-CM

## 2025-06-05 DIAGNOSIS — F90.2 ADHD (ATTENTION DEFICIT HYPERACTIVITY DISORDER), COMBINED TYPE: ICD-10-CM

## 2025-06-05 DIAGNOSIS — F41.9 ANXIETY: ICD-10-CM

## 2025-06-05 DIAGNOSIS — Z65.8: ICD-10-CM

## 2025-06-05 DIAGNOSIS — F33.42 MDD (MAJOR DEPRESSIVE DISORDER), RECURRENT, IN FULL REMISSION: ICD-10-CM

## 2025-06-05 PROCEDURE — 90792 PSYCH DIAG EVAL W/MED SRVCS: CPT | Mod: S$GLB,,, | Performed by: STUDENT IN AN ORGANIZED HEALTH CARE EDUCATION/TRAINING PROGRAM

## 2025-06-05 PROCEDURE — 1160F RVW MEDS BY RX/DR IN RCRD: CPT | Mod: CPTII,S$GLB,, | Performed by: STUDENT IN AN ORGANIZED HEALTH CARE EDUCATION/TRAINING PROGRAM

## 2025-06-05 PROCEDURE — 1126F AMNT PAIN NOTED NONE PRSNT: CPT | Mod: CPTII,S$GLB,, | Performed by: STUDENT IN AN ORGANIZED HEALTH CARE EDUCATION/TRAINING PROGRAM

## 2025-06-05 PROCEDURE — 4010F ACE/ARB THERAPY RXD/TAKEN: CPT | Mod: CPTII,S$GLB,, | Performed by: STUDENT IN AN ORGANIZED HEALTH CARE EDUCATION/TRAINING PROGRAM

## 2025-06-05 PROCEDURE — 3060F POS MICROALBUMINURIA REV: CPT | Mod: CPTII,S$GLB,, | Performed by: STUDENT IN AN ORGANIZED HEALTH CARE EDUCATION/TRAINING PROGRAM

## 2025-06-05 PROCEDURE — 3079F DIAST BP 80-89 MM HG: CPT | Mod: CPTII,S$GLB,, | Performed by: STUDENT IN AN ORGANIZED HEALTH CARE EDUCATION/TRAINING PROGRAM

## 2025-06-05 PROCEDURE — 3044F HG A1C LEVEL LT 7.0%: CPT | Mod: CPTII,S$GLB,, | Performed by: STUDENT IN AN ORGANIZED HEALTH CARE EDUCATION/TRAINING PROGRAM

## 2025-06-05 PROCEDURE — 1159F MED LIST DOCD IN RCRD: CPT | Mod: CPTII,S$GLB,, | Performed by: STUDENT IN AN ORGANIZED HEALTH CARE EDUCATION/TRAINING PROGRAM

## 2025-06-05 PROCEDURE — 99999 PR PBB SHADOW E&M-EST. PATIENT-LVL IV: CPT | Mod: PBBFAC,,, | Performed by: STUDENT IN AN ORGANIZED HEALTH CARE EDUCATION/TRAINING PROGRAM

## 2025-06-05 PROCEDURE — 3075F SYST BP GE 130 - 139MM HG: CPT | Mod: CPTII,S$GLB,, | Performed by: STUDENT IN AN ORGANIZED HEALTH CARE EDUCATION/TRAINING PROGRAM

## 2025-06-05 PROCEDURE — 1101F PT FALLS ASSESS-DOCD LE1/YR: CPT | Mod: CPTII,S$GLB,, | Performed by: STUDENT IN AN ORGANIZED HEALTH CARE EDUCATION/TRAINING PROGRAM

## 2025-06-05 PROCEDURE — 3066F NEPHROPATHY DOC TX: CPT | Mod: CPTII,S$GLB,, | Performed by: STUDENT IN AN ORGANIZED HEALTH CARE EDUCATION/TRAINING PROGRAM

## 2025-06-05 PROCEDURE — 3288F FALL RISK ASSESSMENT DOCD: CPT | Mod: CPTII,S$GLB,, | Performed by: STUDENT IN AN ORGANIZED HEALTH CARE EDUCATION/TRAINING PROGRAM

## 2025-06-05 RX ORDER — BUPROPION HYDROCHLORIDE 300 MG/1
300 TABLET ORAL EVERY MORNING
Qty: 30 TABLET | Refills: 1 | Status: SHIPPED | OUTPATIENT
Start: 2025-06-05 | End: 2025-08-04

## 2025-06-06 ENCOUNTER — LAB VISIT (OUTPATIENT)
Dept: LAB | Facility: HOSPITAL | Age: 65
End: 2025-06-06
Payer: COMMERCIAL

## 2025-06-06 ENCOUNTER — OFFICE VISIT (OUTPATIENT)
Dept: RHEUMATOLOGY | Facility: CLINIC | Age: 65
End: 2025-06-06
Payer: COMMERCIAL

## 2025-06-06 VITALS
SYSTOLIC BLOOD PRESSURE: 114 MMHG | BODY MASS INDEX: 28.27 KG/M2 | DIASTOLIC BLOOD PRESSURE: 71 MMHG | HEART RATE: 67 BPM | WEIGHT: 180.13 LBS | HEIGHT: 67 IN

## 2025-06-06 DIAGNOSIS — R80.9 POSITIVE FOR MICROALBUMINURIA: ICD-10-CM

## 2025-06-06 DIAGNOSIS — R76.8 ANTINUCLEAR ANTIBODY (ANA) POSITIVE: ICD-10-CM

## 2025-06-06 DIAGNOSIS — R97.20 BPH WITH ELEVATED PSA: ICD-10-CM

## 2025-06-06 DIAGNOSIS — M15.0 PRIMARY OSTEOARTHRITIS INVOLVING MULTIPLE JOINTS: ICD-10-CM

## 2025-06-06 DIAGNOSIS — R76.8 ANTINUCLEAR ANTIBODY (ANA) POSITIVE: Primary | ICD-10-CM

## 2025-06-06 DIAGNOSIS — R41.3 MEMORY PROBLEM: ICD-10-CM

## 2025-06-06 DIAGNOSIS — N40.0 BPH WITH ELEVATED PSA: ICD-10-CM

## 2025-06-06 LAB
C3 SERPL-MCNC: 112 MG/DL (ref 50–180)
C4 COMPLEMENT (OHS): 42 MG/DL (ref 11–44)
PSA FREE MFR SERPL: 31.3 %
PSA FREE SERPL-MCNC: 1.69 NG/ML
PSA SERPL-MCNC: 5.4 NG/ML

## 2025-06-06 PROCEDURE — 36415 COLL VENOUS BLD VENIPUNCTURE: CPT

## 2025-06-06 PROCEDURE — 1160F RVW MEDS BY RX/DR IN RCRD: CPT | Mod: CPTII,S$GLB,, | Performed by: INTERNAL MEDICINE

## 2025-06-06 PROCEDURE — 1101F PT FALLS ASSESS-DOCD LE1/YR: CPT | Mod: CPTII,S$GLB,, | Performed by: INTERNAL MEDICINE

## 2025-06-06 PROCEDURE — 86146 BETA-2 GLYCOPROTEIN ANTIBODY: CPT

## 2025-06-06 PROCEDURE — 3066F NEPHROPATHY DOC TX: CPT | Mod: CPTII,S$GLB,, | Performed by: INTERNAL MEDICINE

## 2025-06-06 PROCEDURE — 3060F POS MICROALBUMINURIA REV: CPT | Mod: CPTII,S$GLB,, | Performed by: INTERNAL MEDICINE

## 2025-06-06 PROCEDURE — 3288F FALL RISK ASSESSMENT DOCD: CPT | Mod: CPTII,S$GLB,, | Performed by: INTERNAL MEDICINE

## 2025-06-06 PROCEDURE — 86160 COMPLEMENT ANTIGEN: CPT | Mod: 59

## 2025-06-06 PROCEDURE — 86160 COMPLEMENT ANTIGEN: CPT

## 2025-06-06 PROCEDURE — 84153 ASSAY OF PSA TOTAL: CPT

## 2025-06-06 PROCEDURE — 1126F AMNT PAIN NOTED NONE PRSNT: CPT | Mod: CPTII,S$GLB,, | Performed by: INTERNAL MEDICINE

## 2025-06-06 PROCEDURE — 86147 CARDIOLIPIN ANTIBODY EA IG: CPT | Mod: 59

## 2025-06-06 PROCEDURE — 99204 OFFICE O/P NEW MOD 45 MIN: CPT | Mod: S$GLB,,, | Performed by: INTERNAL MEDICINE

## 2025-06-06 PROCEDURE — 99999 PR PBB SHADOW E&M-EST. PATIENT-LVL IV: CPT | Mod: PBBFAC,,, | Performed by: INTERNAL MEDICINE

## 2025-06-06 PROCEDURE — 3074F SYST BP LT 130 MM HG: CPT | Mod: CPTII,S$GLB,, | Performed by: INTERNAL MEDICINE

## 2025-06-06 PROCEDURE — 3008F BODY MASS INDEX DOCD: CPT | Mod: CPTII,S$GLB,, | Performed by: INTERNAL MEDICINE

## 2025-06-06 PROCEDURE — 1159F MED LIST DOCD IN RCRD: CPT | Mod: CPTII,S$GLB,, | Performed by: INTERNAL MEDICINE

## 2025-06-06 PROCEDURE — 4010F ACE/ARB THERAPY RXD/TAKEN: CPT | Mod: CPTII,S$GLB,, | Performed by: INTERNAL MEDICINE

## 2025-06-06 PROCEDURE — 3078F DIAST BP <80 MM HG: CPT | Mod: CPTII,S$GLB,, | Performed by: INTERNAL MEDICINE

## 2025-06-06 PROCEDURE — 3044F HG A1C LEVEL LT 7.0%: CPT | Mod: CPTII,S$GLB,, | Performed by: INTERNAL MEDICINE

## 2025-06-06 RX ORDER — UBIDECARENONE 30 MG
200 CAPSULE ORAL DAILY
COMMUNITY

## 2025-06-06 RX ORDER — MULTIVITAMIN WITH IRON
TABLET ORAL DAILY
COMMUNITY

## 2025-06-06 ASSESSMENT — ROUTINE ASSESSMENT OF PATIENT INDEX DATA (RAPID3)
PATIENT GLOBAL ASSESSMENT SCORE: 0.5
AM STIFFNESS SCORE: 1, YES
MDHAQ FUNCTION SCORE: 0.4
TOTAL RAPID3 SCORE: 0.78
WHEN YOU AWAKENED IN THE MORNING OVER THE LAST WEEK, PLEASE INDICATE THE AMOUNT OF TIME IT TAKES UNTIL YOU ARE AS LIMBER AS YOU WILL BE FOR THE DAY: 1
FATIGUE SCORE: 0
PSYCHOLOGICAL DISTRESS SCORE: 1.1
PAIN SCORE: 0.5

## 2025-06-07 ENCOUNTER — PATIENT MESSAGE (OUTPATIENT)
Dept: ADMINISTRATIVE | Facility: OTHER | Age: 65
End: 2025-06-07
Payer: COMMERCIAL

## 2025-06-07 NOTE — PROGRESS NOTES
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  He thinks it was due to medication hopes to be back to work in the future.  He is referred to see me because of a positive MONIKA.  He has had some diffuse aching for the past 10 years.  He has no joint swelling.  He also has a history of proteinuria.  This was felt to be secondary to hypertension.  This has been present for the past 3 years.  He has had no joint swelling.  It is worse with activity.  He continues to do exercise.  He denies any increased a.m. pain or morning stiffness.    He has had no unexplained fevers.  He denies any headache.  He has a history of eczema but no other rashes.  He has no conjunctivitis, oral ulcers.  He has dry eye but not dry mouth.  He has no pleurisy, Raynaud's phenomena, chronic or bloody diarrhea, urethral discharge or ulcers.  He has occasional numbness in his hands.  He has no phlebitis.  His father had gout.    Systems review:   General:  Weight has decreased 10 lb   GI:  He has chronic constipation.  He has had a history of elevated transaminases.    : Has prostate problems.    Physical examination:  Skin: No rashes   ENT: Adequate tears in saliva.  No conjunctivitis or oral ulcers.  Chest: Clear to auscultation   Cardiac:  No murmurs, gallops, rubs   Abdomen:  No organomegaly or masses.  No tenderness to palpation   Extremities: No pedal edema  Musculoskeletal:  He has bony hypertrophy of the PIP in D IP.  He has no synovitis.  He has good range of motion of all joints without synovitis.  He has no tender areas to palpation.  Laboratory:  MONIKA positive 1:160 with nucleolar and homogeneous pattern.  He has a negative MONIKA profile.    Assessment:  1.  I think we are dealing with a false-positive MONIKA most likely related to his age   2. He has evidence of osteoarthritis     Plans:   1.  Further laboratory studies obtained   2. I did not give him a regular return  appointment depending on the laboratory studies.  Answers submitted by the patient for this visit:  Rheumatology Questionnaire (Submitted on 6/1/2025)  fever: No  eye redness: No  mouth sores: No  headaches: No  shortness of breath: No  chest pain: No  trouble swallowing: No  diarrhea: No  constipation: No  unexpected weight change: No  genital sore: No  During the last 3 days, have you had a skin rash?: No  Bruises or bleeds easily: No  cough: No

## 2025-06-09 ENCOUNTER — RESULTS FOLLOW-UP (OUTPATIENT)
Dept: UROLOGY | Facility: CLINIC | Age: 65
End: 2025-06-09

## 2025-06-10 ENCOUNTER — RESULTS FOLLOW-UP (OUTPATIENT)
Dept: RHEUMATOLOGY | Facility: CLINIC | Age: 65
End: 2025-06-10

## 2025-06-10 LAB
W BETA-2 GLYCOPROTEIN 1 IGA AB: 0.5 U/ML
W BETA-2 GLYCOPROTEIN 1 IGG AB: 1.2 U/ML
W BETA-2 GLYCOPROTEIN 1 IGM AB: <2.4 U/ML
W CARDIOLIPIN IGG AB: 0.9 GPL
W CARDIOLIPIN IGM AB: 1.1 MPL

## 2025-06-20 ENCOUNTER — PATIENT MESSAGE (OUTPATIENT)
Dept: HEMATOLOGY/ONCOLOGY | Facility: CLINIC | Age: 65
End: 2025-06-20
Payer: COMMERCIAL

## 2025-06-20 ENCOUNTER — DOCUMENTATION ONLY (OUTPATIENT)
Dept: HEMATOLOGY/ONCOLOGY | Facility: CLINIC | Age: 65
End: 2025-06-20
Payer: COMMERCIAL

## 2025-06-20 NOTE — Clinical Note
Hello,   Just wanted to bring to your awareness that Parth Juarez had pharmacogenomics (PGx) testing done through a  initiative [facility] is currently offering for select patients who are taking medications that may be impacted by genetic results.   As part of this , I have reviewed their genetic results and current medications to help provide insight into current medication optimization options, as well as future medication choices as appropriate.   For any future medications prescribed by any [facility] provider, if there is an interaction with the medication and the patient's genetic results, a clinical alert will fire for both providers and pharmacists. These interactions are reviewed by the PGx team monthly and updated as new data becomes available.   If you have any questions about the results or PGx at [facility] in general, please don't hesitate to reach out to me directly.   Caryl Enrique, PharmD  Clinical Pharmacogenomics Pharmacist

## 2025-06-20 NOTE — PROGRESS NOTES
"Pharmacogenomics (PGx) Consult     Chief Complaint: Parth Juarez  has undergone pharmacogenomic testing via self-enrollment in the [employer] Plan PGx . Patient completed sample collection on 4/24/2025 and results were provided on 5/7/2025.     Test results: Pharmacogenomic results can be found in GENOMIC INDICATORS    Past Medical History:  -------------------------------------    ADD (attention deficit disorder)    Asthma    exercise induced    BPH (benign prostatic hyperplasia)    Elevated PSA    Hyperlipidemia    Hypertension        Allergies:   Review of patient's allergies indicates:   Allergen Reactions    No known drug allergies         Medications: Current Medications[1]      Clinical Recommendations based on PGx    -Patient is a rapid metabolizer of citalopram which may increase their risk of suboptimal response due to likely decreased plasma concentration levels. Per chart review, patient has been taking citalopram for many years. If patient is currently tolerating/finds benefit with this medication at current dose, would NOT recommend making changes based solely on pharmacogenomic results.     -For future antidepressant prescribing recommendations, use the "Antidepressants with PGx" Smart Set under Orders to view personalized antidepressant recommendations based on the patients Pgx.     -None of the patient's other current medications are likely impacted by their pharmacogenomic results.     -Patient has 4 PGx results that can impact future medication selection- please see "Clinically Actionable PGx Results" for full list of medication interactions    Clinically Actionable PGx Results   *Note, actionable recommendations may change based on updates to existing PGx literature. For the most up to date medication recommendations based on patients' results, please view the GENOMIC INDICATORS module in Epic.    Genomic Indicators        CYP2B6 Intermediate Metabolizer Updated 5/7/2025 by Security, Standard " Interface User      Genomic results predict that this patient may metabolize CYP2B6 substrates at a rate that falls below average metabolic capacity. Thus, this patient may have an increased risk of adverse or poor response to medications metabolized by CYP2B6. To avoid these types of responses, dose adjustments or alternative therapeutic agents may be necessary when medications metabolized by CYP2B6 are being considered. For questions, call [phone] ([phone] or order PGx Consult [[id number]].         Intermediate Metabolizer of Efavirenz          Genetic results for this patient indicate an increased risk of CNS adverse events with a standard dose of efavirenz based on CYP2B6 genotype. Consider reducing the starting dose to 400 mg/day.    See CPIC clinical guidelines for more information.              Reference Links    CPIC® Guideline for Efavirenz based on CYP2B6 genotype    CPIC® Guideline for Serotonin Reuptake Inhibitor Antidepressants and CYP2D6, ERO4P79, CYP2B6, SLC6A4, and HTR2A                      HBV3Q32 Rapid Metabolizer Updated 5/7/2025 by Security, Standard Interface User      Genomic results predict that this patient may metabolize TEN7L06 substrates at a rate that exceeds average metabolic capacity. Thus, this patient may have an increased risk of adverse or poor response to medications metabolized by BAZ2W58. To avoid these types of responses, dose adjustments or alternative therapeutic agents may be necessary when medications metabolized by YOH7Y03 are being considered. For questions, call [phone] ([phone]) or order PGx Consult [[id number]].         Rapid Metabolizer of Voriconazole        This patient has a HLZ4B78 genomic indicator which indicates increased metabolism of voriconazole. The probability of attainment of therapeutic voriconazole concentrations is small to modest. Consider an alternative drug not dependent on FAR3X99 metabolism, such as isavuconazole, liposomal amphotericin B, or  posaconazole.     See CPIC clinical guidelines for more information.                Rapid Metabolizer of Amitriptyline        This patient has CQL1E69 and/or CYP2D6 genomic indicators which indicate altered metabolism of tricyclic antidepressants such as amitriptyline and potential for lack of efficacy. Consider an alternative drug not metabolized by OJD9B30/CYP2D6.     See CPIC clinical guidelines for more information.                Rapid Metabolizer of Citalopram        This patient has a IEH8Y39 genomic indicator which indicates increased metabolism of citalopram/escitalopram and risk of pharmacotherapy failure. Consider an alternative drug not predominantly metabolized by YKB7L94.     See CPIC clinical guidelines for more information.                Rapid Metabolizer of Dexlansoprazole        The patient has a SWM3Y44 genomic indicator which indicates increased metabolism of PPIs and reduced efficacy. If using for treatment of Helicobacter pylori infection or erosive esophagitis, consider increasing daily dose by % or choosing an alternative PPI not impacted by ROO1W05 (esomeprazole, raberprazole).     See CPIC clinical guidelines for more information.                Rapid Metabolizer of Escitalopram        This patient has a WEQ6O88 genomic indicator which indicates increased metabolism of citalopram/escitalopram and risk of pharmacotherapy failure. Consider an alternative drug not predominantly metabolized by NDV2W66.     See CPIC clinical guidelines for more information.                Rapid Metabolizer of Lansoprazole        The patient has a LOK6V82 genomic indicator which indicates increased metabolism of PPIs and reduced efficacy. If using for treatment of Helicobacter pylori infection or erosive esophagitis, consider increasing daily dose by % or choosing an alternative PPI not impacted by QKF3I42 (esomeprazole, raberprazole).     See CPIC clinical guidelines for more information.                 Rapid Metabolizer of Omeprazole        The patient has a LSJ5E09 genomic indicator which indicates increased metabolism of PPIs and reduced efficacy. If using for treatment of Helicobacter pylori infection or erosive esophagitis, consider increasing daily dose by % or choosing an alternative PPI not impacted by ZYH4H03 (esomeprazole, raberprazole).     See CPIC clinical guidelines for more information.                Rapid Metabolizer of Pantoprazole        The patient has a DJN3T38 genomic indicator which indicates increased metabolism of PPIs and reduced efficacy. If using for treatment of Helicobacter pylori infection or erosive esophagitis, consider increasing daily dose by % or choosing an alternative PPI not impacted by EHI3G72 (esomeprazole, raberprazole).     See CPIC clinical guidelines for more information.               Reference Links    CPIC® Guideline for Clopidogrel and RXD6N63    CPIC® Guideline for Proton Pump Inhibitors and IWO0Y01    CPIC® Guideline for Voriconazole and XEG2E95    CPIC® Guideline for Serotonin Reuptake Inhibitor Antidepressants and CYP2D6, ZNQ5T63, CYP2B6, SLC6A4, and HTR2A    CPIC® Guideline for Tricyclic Antidepressants and CYP2D6 and WXI0T34                      CY Intermediate Metabolizer Updated 2025 by Security, Standard Interface User      Genomic results predict that this patient may metabolize CY substrates at a rate that falls below metabolic capacity. For some populations, absence of functional CY is the normal state. Patients with this genotype may require higher starting doses of certain medications metabolized by CY, including tacrolimus. For questions, call [phone] ([phone]) or order PGx Consult [[id number]].        Intermediate Metabolizer of Tacrolimus        This patient has a CY genomic indicator which indicates decreased chance of achieving target tacrolimus concentrations. Increase starting dose to 1.5 to 2 times the  standard dose.     See CPIC clinical guidelines for more information.              Reference Links    CPIC® Guideline for Tacrolimus and CY                      DPYD Intermediate Metabolizer Updated 2025 by Security, Standard Interface User      Genomic results predict that this patient may metabolize DPYD substrates at a rate that falls below average metabolic capacity (activity score = 1.0 to 1.5). Thus, this patient may have an increased risk of severe adverse response to medications metabolized by DPYD. To avoid this type of response, reduce standard starting dose by at least 50% when medications metabolized by DPYD are being considered. For questions, call [phone] ([phone]) or order PGx Consult [[id number]].         Intermediate Metabolizer of Capecitabine        This patient has a DPYD genomic indicator which indicates DPD deficiency and increased risk of severe or even fatal drug toxicity when treated with fluoropyrimidine drugs such as capecitabine. Reduce starting dose by 50% followed by titration of dose based on toxicity or therapeutic drug monitoring if available.     See CPIC clinical guidelines for more information.               Intermediate Metabolizer of Fluorouracil        This patient has a DPYD genomic indicator which indicates DPD deficiency and increased risk of severe or even fatal drug toxicity when treated with fluoropyrimidine drugs such as capecitabine. Reduce starting dose by 50% followed by titration of dose based on toxicity or therapeutic drug monitoring if available.     See CPIC clinical guidelines for more information.              Reference Links    CPIC® Guideline for Fluoropyrimidines and DPYD                    -If there is an interaction with any future medications and the patient's genetic results, a clinical alert will fire for both providers and pharmacists. These interactions are reviewed by the PGx team and updated as new data becomes available.      -PGx information  "can be accessed by [email or web address]- results with "actionable result DETECTED" should be communicated to providers outside of [facility] as our clinical alerts will NOT fire    -For any additional questions, please don't hesitate to reach out to me or contact [phone] ([phone]).    Caryl Vides, PharmD   Clinical Pharmacogenomics Pharmacist               [1]   albuterol (PROVENTIL HFA) 90 mcg/actuation inhaler    atorvastatin (LIPITOR) 40 MG tablet    atorvastatin (LIPITOR) 40 MG tablet    buPROPion (WELLBUTRIN XL) 300 MG 24 hr tablet    citalopram (CELEXA) 20 MG tablet    co-enzyme Q-10 30 mg capsule    empagliflozin-linagliptin (GLYXAMBI) 10-5 mg Tab    fluticasone propionate (FLONASE) 50 mcg/actuation nasal spray    fluticasone propionate (FLOVENT HFA) 220 mcg/actuation inhaler    inhalation device (AEROCHAMBER PLUS FLOW-VU)    losartan (COZAAR) 100 MG tablet    losartan (COZAAR) 100 MG tablet    omega-3 fatty acids/fish oil (FISH OIL-OMEGA-3 FATTY ACIDS) 300-1,000 mg capsule    tamsulosin (FLOMAX) 0.4 mg Cap    "

## 2025-06-26 ENCOUNTER — CLINICAL SUPPORT (OUTPATIENT)
Dept: PSYCHIATRY | Facility: CLINIC | Age: 65
End: 2025-06-26
Payer: COMMERCIAL

## 2025-06-26 DIAGNOSIS — Z65.8: ICD-10-CM

## 2025-06-26 DIAGNOSIS — F41.9 ANXIETY: ICD-10-CM

## 2025-06-26 DIAGNOSIS — F43.20 ADJUSTMENT DISORDER, UNSPECIFIED TYPE: Primary | ICD-10-CM

## 2025-06-26 PROCEDURE — 99999 PR PBB SHADOW E&M-EST. PATIENT-LVL II: CPT | Mod: PBBFAC,,, | Performed by: COUNSELOR

## 2025-06-26 PROCEDURE — 90791 PSYCH DIAGNOSTIC EVALUATION: CPT | Mod: S$GLB,,, | Performed by: COUNSELOR
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
## 2025-07-02 ENCOUNTER — PATIENT MESSAGE (OUTPATIENT)
Dept: ADMINISTRATIVE | Facility: OTHER | Age: 65
End: 2025-07-02
Payer: COMMERCIAL

## 2025-07-02 ENCOUNTER — PATIENT MESSAGE (OUTPATIENT)
Dept: FAMILY MEDICINE | Facility: CLINIC | Age: 65
End: 2025-07-02
Payer: COMMERCIAL

## 2025-07-02 ENCOUNTER — PATIENT MESSAGE (OUTPATIENT)
Dept: PSYCHIATRY | Facility: CLINIC | Age: 65
End: 2025-07-02
Payer: COMMERCIAL

## 2025-07-02 ENCOUNTER — PATIENT MESSAGE (OUTPATIENT)
Dept: NEUROLOGY | Facility: CLINIC | Age: 65
End: 2025-07-02
Payer: COMMERCIAL

## 2025-07-02 DIAGNOSIS — R41.840 ATTENTION AND CONCENTRATION DEFICIT: Primary | ICD-10-CM

## 2025-07-02 DIAGNOSIS — R41.3 MEMORY LOSS: ICD-10-CM

## 2025-07-02 NOTE — LETTER
July 3, 2025    Parth Juarez  [address] [place] [address]             [place] [address] [place] [address]  Phone: [phone]  Fax: [phone] To whom it may concern,          My patient, Parth Juarez  [birth year], is under my care. He has a mental health condition which prohibits his ability to perform his work duties.  He is undergoing treatment for this condition and will be reassessed for duty at the end of .  Thank you for consideration of this matter.      Please let me know if you have any questions.    Sincerely,        Betty Styles MD

## 2025-07-03 NOTE — LETTER
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 Treatment includes both pharmacotherapy and psychotherapy. You are prescribed psychotropic medications, and you are engaging in ongoing psychotherapy sessions.     This letter serves to confirm your participation in psychiatric treatment and may be used as verification of care.    If you have any questions or concerns, please don't hesitate to call.    Sincerely,         Dionicio Jenkins, DO  Department of Psychiatry, [facility]

## 2025-07-03 NOTE — PROGRESS NOTES
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 Treatment includes both pharmacotherapy and psychotherapy. You are prescribed psychotropic medications, and you are engaging in ongoing psychotherapy sessions.     This letter serves to confirm your participation in psychiatric treatment and may be used as verification of care.    If you have any questions or concerns, please don't hesitate to call.    Sincerely,         Dionicio Jenkins DO  Department of Psychiatry, [facility]     Comment:     Letter will be made available on the portal.          Dionicio Jenkins DO  Department of Psychiatry, [facility]

## 2025-07-08 ENCOUNTER — OFFICE VISIT (OUTPATIENT)
Dept: PSYCHIATRY | Facility: CLINIC | Age: 65
End: 2025-07-08
Payer: COMMERCIAL

## 2025-07-08 VITALS
WEIGHT: 182.31 LBS | DIASTOLIC BLOOD PRESSURE: 80 MMHG | BODY MASS INDEX: 28.61 KG/M2 | SYSTOLIC BLOOD PRESSURE: 132 MMHG | HEART RATE: 62 BPM | HEIGHT: 67 IN

## 2025-07-08 DIAGNOSIS — F41.9 ANXIETY: ICD-10-CM

## 2025-07-08 DIAGNOSIS — F43.20 ADJUSTMENT DISORDER, UNSPECIFIED TYPE: Primary | ICD-10-CM

## 2025-07-08 DIAGNOSIS — Z65.8: ICD-10-CM

## 2025-07-08 DIAGNOSIS — F33.42 MDD (MAJOR DEPRESSIVE DISORDER), RECURRENT, IN FULL REMISSION: ICD-10-CM

## 2025-07-08 DIAGNOSIS — F90.2 ADHD (ATTENTION DEFICIT HYPERACTIVITY DISORDER), COMBINED TYPE: ICD-10-CM

## 2025-07-08 PROCEDURE — 3079F DIAST BP 80-89 MM HG: CPT | Mod: CPTII,S$GLB,, | Performed by: STUDENT IN AN ORGANIZED HEALTH CARE EDUCATION/TRAINING PROGRAM

## 2025-07-08 PROCEDURE — 96136 PSYCL/NRPSYC TST PHY/QHP 1ST: CPT | Mod: 59,S$GLB,, | Performed by: STUDENT IN AN ORGANIZED HEALTH CARE EDUCATION/TRAINING PROGRAM

## 2025-07-08 PROCEDURE — 99214 OFFICE O/P EST MOD 30 MIN: CPT | Mod: S$GLB,,, | Performed by: STUDENT IN AN ORGANIZED HEALTH CARE EDUCATION/TRAINING PROGRAM

## 2025-07-08 PROCEDURE — 3075F SYST BP GE 130 - 139MM HG: CPT | Mod: CPTII,S$GLB,, | Performed by: STUDENT IN AN ORGANIZED HEALTH CARE EDUCATION/TRAINING PROGRAM

## 2025-07-08 PROCEDURE — 3288F FALL RISK ASSESSMENT DOCD: CPT | Mod: CPTII,S$GLB,, | Performed by: STUDENT IN AN ORGANIZED HEALTH CARE EDUCATION/TRAINING PROGRAM

## 2025-07-08 PROCEDURE — G2211 COMPLEX E/M VISIT ADD ON: HCPCS | Mod: S$GLB,,, | Performed by: STUDENT IN AN ORGANIZED HEALTH CARE EDUCATION/TRAINING PROGRAM

## 2025-07-08 PROCEDURE — 3066F NEPHROPATHY DOC TX: CPT | Mod: CPTII,S$GLB,, | Performed by: STUDENT IN AN ORGANIZED HEALTH CARE EDUCATION/TRAINING PROGRAM

## 2025-07-08 PROCEDURE — 1160F RVW MEDS BY RX/DR IN RCRD: CPT | Mod: CPTII,S$GLB,, | Performed by: STUDENT IN AN ORGANIZED HEALTH CARE EDUCATION/TRAINING PROGRAM

## 2025-07-08 PROCEDURE — 1101F PT FALLS ASSESS-DOCD LE1/YR: CPT | Mod: CPTII,S$GLB,, | Performed by: STUDENT IN AN ORGANIZED HEALTH CARE EDUCATION/TRAINING PROGRAM

## 2025-07-08 PROCEDURE — 4010F ACE/ARB THERAPY RXD/TAKEN: CPT | Mod: CPTII,S$GLB,, | Performed by: STUDENT IN AN ORGANIZED HEALTH CARE EDUCATION/TRAINING PROGRAM

## 2025-07-08 PROCEDURE — 99999 PR PBB SHADOW E&M-EST. PATIENT-LVL IV: CPT | Mod: PBBFAC,,, | Performed by: STUDENT IN AN ORGANIZED HEALTH CARE EDUCATION/TRAINING PROGRAM

## 2025-07-08 PROCEDURE — 1159F MED LIST DOCD IN RCRD: CPT | Mod: CPTII,S$GLB,, | Performed by: STUDENT IN AN ORGANIZED HEALTH CARE EDUCATION/TRAINING PROGRAM

## 2025-07-08 PROCEDURE — 3044F HG A1C LEVEL LT 7.0%: CPT | Mod: CPTII,S$GLB,, | Performed by: STUDENT IN AN ORGANIZED HEALTH CARE EDUCATION/TRAINING PROGRAM

## 2025-07-08 PROCEDURE — 1126F AMNT PAIN NOTED NONE PRSNT: CPT | Mod: CPTII,S$GLB,, | Performed by: STUDENT IN AN ORGANIZED HEALTH CARE EDUCATION/TRAINING PROGRAM

## 2025-07-08 PROCEDURE — 3008F BODY MASS INDEX DOCD: CPT | Mod: CPTII,S$GLB,, | Performed by: STUDENT IN AN ORGANIZED HEALTH CARE EDUCATION/TRAINING PROGRAM

## 2025-07-08 PROCEDURE — 3060F POS MICROALBUMINURIA REV: CPT | Mod: CPTII,S$GLB,, | Performed by: STUDENT IN AN ORGANIZED HEALTH CARE EDUCATION/TRAINING PROGRAM

## 2025-07-08 RX ORDER — BUPROPION HYDROCHLORIDE 300 MG/1
300 TABLET ORAL EVERY MORNING
Qty: 90 TABLET | Refills: 1 | Status: SHIPPED | OUTPATIENT
Start: 2025-07-08 | End: 2026-01-04

## 2025-07-08 NOTE — PROGRESS NOTES
"    Outpatient Psychiatry Followup Visit - IN PERSON  7/8/2025  Assessment & Plan    Impression     ICD-10-CM ICD-9-CM   1. Adjustment disorder, unspecified type  F43.20 309.9   2. ADHD (attention deficit hyperactivity disorder), combined type  F90.2 314.01   3. Anxiety  F41.9 300.00   4. MDD (major depressive disorder), recurrent, in full remission  F33.42 296.36   5. Role change  Z65.8 V62.89   6. BMI 28.0-28.9,adult  Z68.28 V85.24      Plan of Care & Medication Management    Chart was reviewed. The risks and benefits of medication were discussed with pt. The treatment plan and followup plan were reviewed with pt. Pt understands to contact clinic if symptoms worsen. Pt understands to call 911 or go to nearest ER for suicidal ideation, intent or plan. Unless otherwise specified, pt did NOT display signs of nor endorse symptoms of overt psychosis or acute mood disorder requiring hospitalization during the encounter; pt denied violent thoughts or suicidal or homicidal ideation, intent, or plan.   RX History ADDERALL XR, CELEXA, RITALIN, STRATTERA, VYVANSE, WELLBUTRIN   Current RX Continue CELEXA  Adjustments:  6/5/2025: Continue 20mg daily  Prior to 6/5/2025, pt reports had been taking for 15-20y  Continue WELLBUTRIN XL  Adjustments:  6/5/2025: Increase to 300mg daily  Prior to 6/5/2025, pt reports had been taking for 15-20y      Other []CONTRACT 7/8/2025?  [] REVIEWED 7/8/2025?  []   RETURN L: STANDARD PROTOCOL: RETURN IN 4 WEEKS (ONE MONTH)       Subjective    Available documentation has been reviewed, and pertinent elements of the chart have been incorporated into this note where appropriate. Last Epic encounter with writer was on 6/5/2025   Parth Juarez, a 65 y.o. male, presenting for followup visit. This visit was done in person, IN CLINIC.      "Pretty good"  "I finally decided I'm NOT going to push it"  "I'm going to have a new life"  Considering working part time or volunteering at a clinic  Looking " "forward to spending more time with wife and grandchildren  Planning to exercise    Tolerated WELLBUTRIN increase  BP wnl  Will continue to monitor    NO ETOH use    Keeping therapy appointments   Pt was encouraged to keep therapy appointments  Continue treatment as planned        Objective    Vitals:    07/08/25 0756   BP: 132/80   Pulse: 62   Weight: 82.7 kg (182 lb 5.1 oz)   Height: 5' 7" (1.702 m)     (Current body mass index is 28.56 kg/m².)    MSE/PE  1. Appearance: Dress is informal but appropriate. Motor activity normal.  2. Discourse: Clear speech with normal rate and volume. Associations intact. Orderly.  3. Emotional Expression: Somewhat anxious.  4. Perception and Thinking: No hallucinations. No suicidality, no homicidality, delusions, or paranoia.  5. Sensorium: Grossly intact. Able to focus for interview.  6. Memory and Fund of Knowledge: Intact for content of interview.  7. Insight and Judgment: Intact.       Measurement-Based Care (MBC):     Routine Instruments   PROMIS-ANXIETY Interpretation: 7 (4a raw score): T-SCORE 53.7; NONE TO SLIGHT using 55-60-70 cutoffs.   GDS4 Interpretation: 0/4; NEGATIVE for depression   WHO-5: 21 raw: 84%   Additional Instruments   MBC ANCHOR : a little better         Auto-populated chart data omitted from this note for brevity.      Billing Documentation:     Method IN PERSON visit at the clinic, established   E/M [address]: FOLLOW UP VISIT, Rx mgmt, "Multiple STABLE chronic illnesses"   Additional 78467, with modifer 59: administered and scored more than one psychological or neuropsychological tests (see MBC above) (16+ mins)  , without modifiers -24,-25,-53: COMPLEXITY: Visit today included increased complexity associated with the care of the episodic problem(s) (multiple psychiatric disorders - see above) addressed and managing the longitudinal care of the patient due to the serious and/or complex managed problem(s) (multiple psychiatric disorders - see above).     "          Dionicio Jenkins, DO  Department of Psychiatry, [facility]

## 2025-07-10 ENCOUNTER — CLINICAL SUPPORT (OUTPATIENT)
Dept: PSYCHIATRY | Facility: CLINIC | Age: 65
End: 2025-07-10
Payer: COMMERCIAL

## 2025-07-10 DIAGNOSIS — Z65.8: ICD-10-CM

## 2025-07-10 DIAGNOSIS — F41.9 ANXIETY: ICD-10-CM

## 2025-07-10 DIAGNOSIS — F43.20 ADJUSTMENT DISORDER, UNSPECIFIED TYPE: Primary | ICD-10-CM

## 2025-07-10 PROCEDURE — 90837 PSYTX W PT 60 MINUTES: CPT | Mod: S$GLB,,, | Performed by: COUNSELOR

## 2025-07-10 NOTE — PROGRESS NOTES
"Individual Psychotherapy [facility]  7/10/25    Site/Location:  [facility]    Visit Type: 60 min outpt individual psychotherapy    Participants: Ct and this clinician.    Therapeutic Intervention: Met with patient Outpatient - Interactive psychotherapy 60 min - CPT code 65131    Chief complaint/reason for encounter: Depression / Anxiety    Interval history and content of current session: Client reported no significant change in mood since the previous session.  Client was introduced to and participated in calm breathing, focused attention, and grounding.  He participated fully in the exercises.Client and clinician discussed use of IFS therapy and memory reconsolidation techniques, bilateral stimulation (BLS), and observation of body sensations/somatic responses.Client was able to construct a calm place he named his  "beach place ".   It contained a leak seen with the sound of waves, a breeze, in the noticing of his breaths floating on the waves.  Discussed the use of these tools as a daily routine of mindfulness exercises.Today, client denied any current or recent SI/HI.    Treatment plan:  Target symptoms: depression/anxiety  Why chosen therapy is appropriate versus another modality: Relevant to diagnosis  Outcome monitoring methods: self-report. CSSRS.   Therapeutic intervention type: supportive psychotherapy. IFS therapy, memory reconsolidation.     Risk parameters:  Patient reports no suicidal ideation  Patient reports no homicidal ideation  Patient reports no self-injurious behavior  Patient reports no violent behavior    Verbal deficits: None    Patient's response to intervention:  The patient's response to intervention is accepting.    Progress toward goals and other mental status changes:  The patient's progress toward goals is good.    Diagnosis:   Adjustment disorder, unspecified type  Role change  Anxiety    Plan:  Individual psychotherapy weekly. Utilize IFS therapy and memory reconsolidation to " raise emotional tolerance and decrease negative symptoms. Ct acknowledged plan and is agreement with the plan.    Return to clinic: 1 week    Length of Service (minutes): 58       Each patient to whom he or she provides medical services by telemedicine is: (1) informed of the relationship between the physician and patient and the respective role of any other health care provider with respect to management of the patient; and (2) notified that he or she may decline to receive medical services by telemedicine and may withdraw from such care at any time.

## 2025-07-15 ENCOUNTER — OFFICE VISIT (OUTPATIENT)
Dept: NEUROLOGY | Facility: CLINIC | Age: 65
End: 2025-07-15
Payer: COMMERCIAL

## 2025-07-15 DIAGNOSIS — R41.3 MEMORY LOSS: ICD-10-CM

## 2025-07-15 DIAGNOSIS — R41.840 ATTENTION AND CONCENTRATION DEFICIT: ICD-10-CM

## 2025-07-15 PROCEDURE — 96158 HLTH BHV IVNTJ INDIV 1ST 30: CPT | Mod: 95,,, | Performed by: PSYCHIATRY & NEUROLOGY

## 2025-07-15 PROCEDURE — 99499 UNLISTED E&M SERVICE: CPT | Mod: 95,,, | Performed by: PSYCHIATRY & NEUROLOGY

## 2025-07-15 NOTE — PROGRESS NOTES
NEUROPSYCHOLOGY INTERVENTION (TELEHEALTH)     Referral Information  Name: Parth Juarez  MRN: [id number]  : 1960  Age: 65 y.o.  Race: Other  Gender: male  Referring Provider: Abbi Hernandez Np  [address] [facility] [address]  [address] [place] [address]  Billing: See below for details   Telemedicine:   The patient location is: [place], [place]  The provider location is: [place], [place]  The chief complaint leading to consultation/medical necessity is: Promotion of functional improvement, minimization of psychological or psychosocial barriers to recovery, and management of and improved coping with medical conditions  Visit type: Virtual visit with synchronous audio and video  Total time spent with patient: 31 minutes  Each patient to whom he or she provides medical services by telemedicine is:  (1) informed of the relationship between the physician and patient and the respective role of any other health care provider with respect to management of the patient; and (2) notified that he or she may decline to receive medical services by telemedicine and may withdraw from such care at any time.    Consent/Emergency Plan: The patient expressed an understanding of the purpose of the evaluation and consented to all procedures. I informed the patient of limits to confidentiality and discussed an emergency plan.    SUMMARY/TREATMENT PLAN   Diagnoses/Plan:  Problem List Items Addressed This Visit          Neuro    Attention and concentration deficit    Relevant Orders    Ambulatory Referral/Consult to Speech Therapy    Memory loss    Relevant Orders    Ambulatory Referral/Consult to Speech Therapy     Mr. Juarez completed a neuropsychological evaluation with an outside provider and was referred by his neurologist for a visit to discuss the evaluation report and respond to questions regarding the findings. Based on that evaluation, the patient has the capacity to understand and respond meaningfully to the planned intervention despite  areas of weakness on testing.    During the session, we discussed Mr. Juarez's questions about the findings of the evaluation, including difficulty with attention and working memory, manual dexterity, and adjusting his approach in response to feedback. He indicated he is taking steps to address areas of weakness and expressed interest in additional intervention. He also asked about repeat testing, if symptoms improve with treatment.    Intervention included psychoeducation related to the psychological, behavioral, and/or psychosocial aspects of the patients illness or presenting problem and coping skills training/supportive counseling regarding treatment recommendations. Goals of the intervention included increased understanding of the impact of psychological and cognitive factors on treatment adherence and effectiveness, and intervention is expected to benefit Mr. Juarez's physical disease management and to comply with and/or benefit from medical interventions.    This is expected to be an as needed service to provide supports for treatment of attention and concentration difficulties. Mr. Juarez responded well to the intervention.    Thank you for allowing me to participate in Mr. Juarez' care.  If you have any questions, please contact me at [phone].     Mary Wilks, Ph.D., ABPP  Board Certified in Clinical Neuropsychology  [facility] - Department of Neurology    BILLING  Service Description CPT Code Minutes Units   Health behavior intervention, individual face-to-face; initial   30 minutes [id number] 1   Health behavior intervention, individual face-to-face; each   additional 15 minutes 00875  0   Health behavior intervention, family (with the patient present),   face-to-face; initial 30 minutes 18130  0   Health behavior intervention, family (with the patient present), face-to-face; each additional 15 minutes 90937  0   Health behavior intervention, family (without the patient   present), face-to-face  initial 30 minutes 82908  0   Health behavior intervention, family (without the patient   present), face-to-face; each additional 15 minutes 60661  0

## 2025-07-15 NOTE — PATIENT INSTRUCTIONS
Recommendations:   Medical Follow-Up: Continue usual follow up for current active medical issues, including continuing behavioral health treatment, treatment of sleep apnea and vascular risk factors, and monitoring of hearing over time. Consider repeat neuropsychological evaluation in 12-18 months from initial assessment, if symptoms improve.  Other Relevant Orders/Issues:   Rehabilitation: Referral for Speech/Language Therapy is recommended to address ongoing cognitive symptoms.    Recommendations for Mr. Juarez:  Attention: Remember that inattention and lack of focus are major culprits to forgetting information so be sure and practice paying attention for adequate learning of information. If you rely on passive attention to remembering something (e.g., yeah, uh-huh approach), youll find you cannot recall it later. I recommend the following to improve attention, which may aid in later recall:   Reduce distractions in the area as much as possible.  Look at the person as they are speaking to you.   Paraphrase as they are speaking  Write down important pieces of information   Ask them to repeat if you zone out.   Have them simplify and reduce information that you need to attend to during conversation.   Have visual cues to remind you if you need to do something later.  Executive Functioning:  Dont attempt to multi-task.  Separate tasks so that each can be completed one at a time.  Consider using a calendar/day planner, as that may be effective to help you plan and stay on track.  Color-coding specific tasks by importance may add additional benefit to your planner.  Break down large projects into smaller tasks and write down the steps to completing the task.  Taking notes while reading can help with recall.    Practice good cognitive/brain health hygiene:  Get a good nights sleep, as this can enhance alertness and cognition.  Keep your brain active. Find activities to stay mentally active, such as reading, games  (cards, checkers), puzzles (crosswords, Sudoku, jig saw), crafts (models, woodworking), gardening, or participating in activities in the community.  Stay socially engaged. Continue staying active with your family and friends.  Find information regarding brain health and various disease processes at [employer] (www.[employer]).    Managing Vascular Risk Factors: A personal history of disorders that affect the cardiovascular system (e.g., hypertension, hyperlipidemia, diabetes) can have a negative impact on brain functioning especially over many years. Therefore, it is very important for Mr. Juarez to maintain good control over risk factors. The following is recommended:  Take all medications as prescribed and follow-up with recommendations above.  Get regular physical exercise to the extent that it is possible.  Follow a Mediterranean diet, which emphasizes plant-based foods, whole grains, fish and healthy fats, such as olive oil, and has been shown to be brain protective.   Check blood pressure, cholesterol levels, blood sugar, and others as appropriate.    Recommendations: Consider stress management techniques to reduce anxiety and respond to anxiety as it arises. Heres a simple three-minute exercise you can use no matter where you are:  1. Sit or stand up nice and tall. Let your eyes relax. Relax your jaw. Let your shoulders relax down your back and start to focus your attention on your breath.  2. Breathe all the way in through your nose, filling your belly with your breath. Then, breathe all the way out through your nose. Its that simple.  3. Start to breathe into a count of three. Inhale for one, two, three. Then, exhale into a count of six: six, five, four, three, two and one.  4. Repeat. Inhale: one, two, three. Exhale: six, five, four, three, two and one.  5. Continue just like this for three minutes, or as long as you'd like. You can set a timer on your phone at the beginning of your  practice.    Should your mind wander, simply notice, without judgment. Observe your thought. And let it go. Return your breath to your attention as many times as it takes, training your mind in the same way we train our bodies.    https://blog.[facility].org/articles/3-minute-mindful-breathing-exercise-for-anxiety-relief    https://blog.[facility].org/articles/shake-it-off-in-the-new-year-simple-stress-relief-techniques      Mary Wilks, Ph.D., ABPP  Board Certified in Clinical Neuropsychology  [facility] - Department of Neurology

## 2025-07-17 ENCOUNTER — CLINICAL SUPPORT (OUTPATIENT)
Dept: REHABILITATION | Facility: HOSPITAL | Age: 65
End: 2025-07-17
Attending: PSYCHIATRY & NEUROLOGY
Payer: COMMERCIAL

## 2025-07-17 DIAGNOSIS — R41.841 COGNITIVE COMMUNICATION DEFICIT: Primary | ICD-10-CM

## 2025-07-17 PROCEDURE — 96125 COGNITIVE TEST BY HC PRO: CPT | Mod: PO

## 2025-07-18 ENCOUNTER — OFFICE VISIT (OUTPATIENT)
Dept: OPTOMETRY | Facility: CLINIC | Age: 65
End: 2025-07-18
Payer: COMMERCIAL

## 2025-07-18 DIAGNOSIS — H18.513 CORNEAL GUTTATA OF BOTH EYES: ICD-10-CM

## 2025-07-18 DIAGNOSIS — H52.7 REFRACTIVE ERROR: ICD-10-CM

## 2025-07-18 DIAGNOSIS — H25.13 NUCLEAR SCLEROSIS OF BOTH EYES: ICD-10-CM

## 2025-07-18 DIAGNOSIS — Z01.00 EXAMINATION OF EYES AND VISION: Primary | ICD-10-CM

## 2025-07-18 DIAGNOSIS — H11.001 PTERYGIUM OF RIGHT EYE: ICD-10-CM

## 2025-07-18 PROCEDURE — 99999 PR PBB SHADOW E&M-EST. PATIENT-LVL III: CPT | Mod: PBBFAC,,, | Performed by: OPTOMETRIST

## 2025-07-18 NOTE — PROGRESS NOTES
Chief Complaint   Patient presents with   • Medication Management       HISTORY OF PRESENT ILLNESS:  Doris is a 66 year old White  female here today for follow up.  She presented to Walk In in May after an episode of dizziness.  Head CT, EKG and blood work were obtained which were all normal.    Although she comments that the dizziness has improved, it still remains, and she is tired.  She has a known history of prediabetes well controlled on Metformin [id number].  Hypertension, well controlled on Lisinopril 20mg daily.    There have been no new medication therapies.    She describes the dizziness as \" her head feels funny, her eyes feel like they sink\".  She feels off-balanced.  Rapid change of position makes the dizziness worse.  She states that at times the room is spinning.    Another concern is abdominal bloating, constipation and gas.  After reviewing her diet, she indicates she has been eating bananas daily    I have reviewed the patient's allergies, surgical, social and family history, updating these as appropriate.  See Histories section of the EMR for a display of this information. Medications and past medical history is noted below.    Current Outpatient Prescriptions   Medication Sig Dispense Refill   • MAGNESIUM PO Take by mouth daily.     • lisinopril (ZESTRIL) 20 MG tablet TAKE 1 TABLET BY MOUTH DAILY. 90 tablet 1   • simvastatin (ZOCOR) 20 MG tablet TAKE 1 TABLET BY MOUTH ONCE DAILY 90 tablet 1   • metformin (GLUCOPHAGE-XR) 500 MG 24 hr tablet TAKE 1 TABLET BY MOUTH ONCE DAILY IN THE MORNING WITH BREAKFAST 90 tablet 1   • MILK THISTLE PO Take 1 tablet by mouth daily.     • Multiple Vitamins-Minerals (WOMENS 50+ MULTI VITAMIN/MIN PO) Take 1 tablet by mouth daily.     • CALCIUM CITRATE-VITAMIN D PO Take 1,200 mg by mouth daily.       No current facility-administered medications for this visit.         Past Medical History:   Diagnosis Date   • Arthralgia    • HLD (hyperlipidemia)    • HTN (hypertension)   HPI    Pt states no change in vision noticed, glasses crooked needs to be fixed  Using AT's a few times a week, no complaints     (-)headaches/eye pain  (-)FOL/floaters   Last edited by Anay Sierra on 7/18/2025  7:40 AM.            Assessment /Plan     For exam results, see Encounter Report.    Examination of eyes and vision    Nuclear sclerosis of both eyes    Refractive error    Pterygium of right eye    Corneal guttata of both eyes      1. Examination of eyes and vision (Primary)    2. Nuclear sclerosis of both eyes  Mild OU, not visually significant  Discussed possible ocular affects of cataracts. Acceptable BCVA OU.   Discussed treatment options. Surgery not recommended at this time.   Monitor yearly.     3. Refractive error  Dispensed updated spectacle Rx -- stable from previous.     4. Pterygium of right eye  Not in visual axis  Recommend otc artificial tears 2-4 times daily    5. Corneal guttata of both eyes  Mild OU, not visually significant  Monitor yearly, sooner if any vision changes                      • Type 2 diabetes mellitus without complication (CMS/HCC) 4/5/2016       REVIEW OF SYSTEMS:  Constitutional:  Denies fever or chills.   Eyes:  Denies change in visual acuity.   HENT:  Denies nasal congestion or sore throat.   Respiratory:  Denies cough or shortness of breath.   Cardiovascular:  Denies chest pain or edema.   Gastrointestinal:  See HPI  Genitourinary:  Denies dysuria.   Musculoskeletal:  Denies back pain or joint pain.   Integument:  Denies rash.   Neurologic:  Denies headache, focal weakness or sensory changes.   Endocrine:  Denies polyuria or polydipsia.   Lymphatic:  Denies swollen glands.   Psychiatric:  Denies depression or anxiety.     PHYSICAL EXAMINATION:  Visit Vitals   • /80   • Pulse 76   • Resp 16   • Ht 5' 3\" (1.6 m)   • Wt 88.5 kg   • BMI 34.54 kg/m2      Constitutional:  Well-developed, well-nourished, no acute distress, non-toxic appearance.  Eyes:  Pupils equal, round, reactive to light, conjunctivae normal.   HENT:  Atraumatic, external ears normal, nose normal, oropharynx moist, no pharyngeal exudates. Neck- normal range of motion, no tenderness, supple.   Respiratory:  No respiratory distress, normal breath sounds, no rales, no wheezing.   Cardiovascular:  Normal rate, normal rhythm, no murmurs, no gallops, no rubs, no edema  Gastrointestinal:  Soft, nondistended, normal bowel sounds, nontender, no organomegaly, no mass, no rebound, no guarding.   Integument:  Well-hydrated, no rash.   Lymphatic:  No lymphadenopathy noted.   Neurologic:  Alert and oriented times 3, cranial nerves 2-12 normal, normal motor function, normal sensory function, no focal deficits noted. Hallpike negative.  Psychiatric:  Speech and behavior appropriate.     HEALTH MAINTENANCE:   Health maintenance was reviewed with patient today. Patient accepted  recommendations.  I explained the risks and benefits of the procedures.     LAB:  See walk in report    ASSESSMENT:  1. Dizziness    2.   Constipation    PLAN:  Orders Placed This Encounter   • MAGNESIUM PO      Return if symptoms worsen or fail to improve.   Patient Instructions     Follow up dizziness and abdominal bloating.    Start Prilosec OTC.   If no improvement after 2 weeks contact clinic for additional workup.    Information regarding vertigo was shared with patient.  She verbalized understanding and will f/u if she has additional or persistent symptoms.      Managing Balance Problems: Vestibular Rehabilitation Therapy  An inner ear problem can knock out part of the balance system. Vestibular rehabilitation therapy helps you learn how to rely on specific parts of your balance system.  Checking eye movement  The therapist will check for nystagmus. This is an automatic, jumpy eye movement. Nystagmus in certain positions can indicate an inner ear problem. It can even show which semicircular canal is affected. The therapist will watch your eyes while you move into different positions.  Treating your condition    Treatment can include:  · Canalith repositioning, a series of guided head and body movements. It helps move crystals, easing benign positional vertigo (BPV) symptoms.  · Habituation exercises to retrain your balance system. The therapist will teach you how to do these exercises at home.  · Gaze stabilization exercises to retrain the eyes to stay in focus while the head moves. This helps ease dysequilibrium.  · Gait and balance training, which includes standing and walking on different surfaces. The therapist can teach you how to maintain balance and prevent falls.  Doing habituation exercises  The therapist will teach exercises suited to your condition. Habituation exercises will make you dizzy at first. Just remind yourself that symptoms probably will last for only a minute. If you keep doing the exercises, they will help lessen your dizziness. Then, when you perform a similar movement in daily life, it is less likely to provoke  symptoms.  Getting back into action  Dizziness doesn't have to keep you from exercising. Start with activities that don't trigger episodes. Then ease into more challenging activities. Try these tips:  · Always exercise with a partner.  · Stop if you have nausea or faintness.  · Walk on a treadmill, holding on to the handles for support.  · Use a ball machine for sports like tennis until you're ready for a live game. This way you know where to expect the ball.  · Don't give up. With time, most people can return to activities and sports.  © 3431-3548 [employer]. [address] [place] [address], [place] [address]. All rights reserved. This information is not intended as a substitute for professional medical care. Always follow your healthcare professional's instructions.        Benign Paroxysmal Positional Vertigo  Benign paroxysmal positional vertigo (BPPV) is a problem with the inner ear. The inner ear contains the vestibular system. This system is what helps you keep your balance. BPPV causes a feeling of spinning. It is a common problem of the vestibular system.  Understanding the vestibular system  The vestibular system of the ear is made up of very tiny parts. They include the utricle, saccule, and semicircular canals. The utricle is a tiny organ that contains calcium crystals. In some people, the crystals can move into the semicircular canals. When this happens, the system no longer works as it should. This causes BPPV. Benign means it is not life-threatening. Paroxysmal means it happens suddenly. Positional means that it happens when you move your head. Vertigo is a feeling of spinning.  What causes BPPV?  Causes include injury to your head or neck. Other problems with the vestibular system may cause BPPV. In many people, the cause of BPPV is not known.  Symptoms of BPPV  You many have repeated feelings of spinning (vertigo). The vertigo usually lasts less than 1 minute. Some movements, suchas rolling  over in bed, can bring on vertigo.  Diagnosing BPPV  Your primary health care provider may diagnose and treat your BPPV. Or you may see an ear, nose, and throat doctor (otolaryngologist). In some cases, you may see a nervous system doctor (neurologist).  The health care provider will ask about your symptoms and your medical history. He or she will examine you. You may have hearing and balance tests. As part of the exam, your health care provider may have you move your head and body in certain ways. If you have BPPV, the movements can bring on vertigo. Your provider will also look for abnormal movements of your eyes. You may have other tests to check your vestibular or nervous systems.  Treatment for BPPV  Your health care provider may try to move the calcium crystals. This is done by having you move your head and neck in certain ways. This treatment is safe and often works well. You may also be told to do these movements at home. You may still have vertigo for a few weeks. Your health care provider will recheck your symptoms, usually in about a month. Special physical therapy may also be part of treatment.  In rare cases surgery may be needed for BPPV that does not go away.     When to call the health care provider  Call your health care provider right away if you have any of these:  · Symptoms that do not go away with treatment  · Symptoms that get worse  · New symptoms      © 1223-6906 [employer]. [address] [place] [address]. All rights reserved. This information is not intended as a substitute for professional medical care. Always follow your healthcare professional's instructions.

## 2025-07-21 NOTE — PROGRESS NOTES
Outpatient Rehab    Speech-Language Pathology Evaluation (only)    Patient Name: Parth Juarez  MRN: [id number]  YOB: 1960  Encounter Date: 7/17/2025    Therapy Diagnosis:   Encounter Diagnosis   Name Primary?    Cognitive communication deficit Yes     Physician: Mary Wilks, PhD    Physician Orders: Eval and Treat  Medical Diagnosis: Attention and concentration deficit  Memory loss  Surgical Diagnosis: Not applicable for this Episode   Surgical Date: Not applicable for this Episode  Days Since Last Surgery: Not applicable for this Episode    Visit # / Visits Authorized: 1 / 1   Insurance Authorization Period: 7/15/2025 to 12/31/2025  Date of Evaluation: 7/17/2025   Plan of Care Certification: 7/17/25-8/28/25    Time In: 0800   Time Out: 0855  Total Time (in minutes): 55   Total Billable Time (in minutes): 55    Intake Outcome Measure for FOTO Survey    Therapist reviewed FOTO scores for Parth Juarez on 7/17/2025.   FOTO report - see Media section or FOTO account episode details.     Intake Score (%): Not applicable for this Episode  DUE NEXT SESSION    Precautions:  Additional Precautions and Protocol Details: Standard    Subjective   History of Present Illness  Parth is a 65 y.o. male who reports to Speech-Language Pathology with a chief concern of cognitive changes.     The patient reports a medical diagnosis of Attention and concentration deficit (R41.840), Memory loss (R41.3).         Patient presents in clinic breathing with Room air.      Patient is a pleasant 65-year-old male presenting to outpatient speech therapy with a chief complaint of memory difficulties. He reports these difficulties have been present for the past couple of years, with noticeable changes in the last year and a half. During that time, he began dozing off at work, including once while with a patient. 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 He reports his staff would notice him sleeping and began closing the doors to minimize disruptions. He recalls one episode occurring after only four hours of sleep and a workout that morning. He reports his staff have also reported delayed decision-making regarding incoming patients, which he disagrees with. He previously underwent a cognitive study by a neurologist, which he reports he "failed." No finding of cognitive report in his chart. His history is significant for ADHD and hearing loss. He is currently receiving cognitive therapy and is taking Wellbutrin; however, he notes that the higher dose has made him feel more "edgy." He continues to struggle with memory, especially in his fast-paced work environment, and feels more "puzzled" at times. He describes difficulty retrieving words at work-particularly more "fancy" or complex vocabulary-which requires increased cognitive effort. English is his second language, which may contribute to these challenges. He also reported an incident about a month and a half ago in which he unintentionally took a different driving route than usual, despite knowing his destination. 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     Pain  No Pain Reported: Yes                 Review of Systems  Patient reports: Sleep Disturbance, Anxiety, Confusion, Depression, and Memory Changes        Employment  Patient reports: Does the patient's condition impact their ability to work?  Employment Status: On leave  Patient reports highest level of education as Postgraduate degree.  Patient reports he is on extended leave due to "fit for duty".      Past Medical History/Physical Systems Review:   Parth Juarez  has a past medical history of ADD (attention deficit disorder), Asthma, BPH (benign prostatic hyperplasia), " Elevated PSA, Hyperlipidemia, and Hypertension.    Parth Juarez  has a past surgical history that includes Pterygium excision w/ graft; removal of colon polyps; Colonoscopy ([id number]); Breast biopsy; Colonoscopy (N/A, 4/11/2018); Transrectal biopsy of prostate with ultrasound guidance (N/A, 3/22/2022); and Cystoscopy (N/A, 3/22/2022).    Parth has a current medication list which includes the following prescription(s): albuterol, atorvastatin, atorvastatin, bupropion, citalopram, co-enzyme q-10, empagliflozin-linagliptin, fluticasone propionate, fluticasone propionate, inhalation spacing device, losartan, losartan, fish oil-omega-3 fatty acids, and tamsulosin.    Review of patient's allergies indicates:   Allergen Reactions    No known drug allergies         Objective    Cognition  The patient's level of consciousness is Alert. Orientation level is Oriented x4.      Observed attention impairments include Sustained attention, Selective attention, Alternating attention, and Divided attention.   Processing speed is Intact.      Cognitive-Communication  Social Communication  The patient's behavior and affect were observed and the patient was found to be Distracted, Calm, and Pleasant.       Joint attention was evaluated and the patient was Able to initiate and Able to respond.    Intact: Eye Contact  Impaired: Turn Taking         The Repeatable Battery for the Assessment of Neuropsychological Status (RBANS) Version A was administered to measure the patient's attention, language, visuospatial/constructional abilities, and immediate and delayed memory. The results are outlined below:    Domain Subtest Total Score Index Score   Immediate Memory List Learning 27   87    Story Memory 13    Visuospatial/  Constructional Figure Copy 18   96    Line Orientation 16    Language Picture Naming 10   96    Semantic Fluency 18    Attention Digit Span 6   72    Coding 37      Delayed Memory List Recall 9     102    List Recognition  20     Story Recall 6     Figure Recall 15        Total Scale   453     Percentile   18%     Descriptor   Low Average   Interpretation:  Scaled score: mean of 10, standard deviation of 3. Therefore, 16+ = Very Superior; 14-15 = Superior; 12-13 = High Average; 8-11 = Average; 6-7 = Low Average; 4-5 = Borderline; 3 and below = Extremely Low    Index score: mean of 100, standard deviation of 15. Therefore, 130+ = Very Superior; 120-129 = Superior; 110-119 = High average;  = Average; 80-89 = Low Average; 70-79 = Borderline; 69 and below = Extremely Low. Apparently the most reliable score; factor in education level.    Immediate Memory Score: Recalling information following immediate presentation is assessed through the List Learning and Story Memory subtests. In the List Learning subtest, the patient is given 10 words to remember. This list is presented four times overall. In this subtest, the patient did demonstrate learning over the 4 trials. The patient recalled 6 items on trial one, 6 items on trial two, 7 items on trial three, and 8 items in trial 4. In the Story Memory subtest, the patient recalled 6 details on the first presentation and 7 details on the second presentation. Results of this domain indicate Low average performance.  Visuospatial score: Perceiving spatial relations and constructing a spatially accurate copy of a drawing is assessed through the Figure Copy and Line Orientation subtests. The Figure Copy subtest asks the patient to copy a complex line drawing. The patient correctly copied 18 details from the figure. The Line Orientation subtest the presents the patient with 12 line displays and asks the patient to match two given lines at the bottom to the display at the top.  The patient correctly identified 16/20. Results of this domain indicate Average performance.  Language score: Naming common items and retrieving learned material is assessed through the Picture Naming and Semantic Fluency  subtests. The Picture Naming subtest asks the patient to name 10 line drawings. The patient was able to accurately name 10/10 items. The Semantic Fluency subtest asks the patient to name as many fruits and vegetables as he can in 60 seconds. The patient was able to name 18 fruits and vegetables. These results indicate Average performance.   Attention score: Attending to, holding and manipulating information presented visually and orally in working memory is assessed with use of the Digit Span and Coding subtests. The Digit Span subtest asks the patient to repeat progressively lengthening strings of numbers. The Coding subtest asks the patient to alternate attention between a key the given work and then to decode symbols to numbers. The patients results on these subtest indicate Borderline  performance.  Delayed Memory score: Anterograde memory capacity is assessed through the List Recall, List Recognition, Story Recall, and Figure Recall subtests. The List Recall subtest asks the patient to recall items from the list presented at the beginning of the test. The patient was able to recall 9/10 items. The List Recognition subtest has the patient recall whether a word was or was not in the original list. The patient accurately identified whether a word was or was not on the list in 20 of 20 items. On the Story Recall subtest, the patient was able to recall 6 details. Finally, on the Figure Recall, the patient recalled 15 details. Results of this domain indicate Average performance.    Overall, according to the RBANS research, total Scale index is a good indicator of general cognitive functioning. 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 Current deficits are negatively impacting the patient's ability to complete tasks effectively and efficiently, he would benefit from skilled speech therapy services.         Time Entry(in  minutes):  Standardized Cog Perf Testing w/ Interp Time Entry: 55    Assessment & Plan   Assessment   Patient presents with a mild cognitive-communication impairment characterized by deficits in immediate memory and attention as assessed by the RBANS and other informal measures. Patient endorsed difficulties with organizing his thoughts and his ability to start and complete tasks which was consistent throughout informal assessment and case history. Patient demonstrated difficulty with turn-taking and topic maintenance, which could be contributed to his ADHD, which he is medicated for. Overall, these cognitive deficits impact his functioning across all environments, resulting in reduced efficiency and efficacy in task completion. The patient would benefit from skilled Speech Therapy services to address cognitive-communication deficits and to develop appropriate compensatory strategies as needed.     Patient Goal for Therapy (SLP): Helping the attention and to reply to the person better, eliminate the procrastination    Prognosis: Excellent      Plan  From a speech language pathology perspective, the patient would benefit from: Skilled Rehab Services    Planned therapy interventions and modalities include: Cognitive therapy.      Visit Frequency: 1 times Per Week for 6 Weeks.     This plan was discussed with Patient.   Discussion participants: Agreed Upon Plan of Care       The patient's spiritual, cultural, and educational needs were considered, and the patient is agreeable to the plan of care and goals.     Goals:   Active       1. Short term goals       Stg 1       Start:  07/17/25    Expected End:  08/28/25       Patient will use thought organization strategies (e.g., externalize thoughts, slow down, visualize, brainstorming, etc) with minimal cues during therapy tasks to organize and express thoughts in 4/5 opportunities when discussing familiar/unfamiliar topics or completing structured tasks.           Stg 2        Start:  07/17/25    Expected End:  08/28/25       Patient will prioritize daily activities (work or home) by making a list with 3-4 target tasks for the day in a prioritized list and demonstrate and understanding of the rationale to reduced cognitive load         Stg 3       Start:  07/17/25    Expected End:  08/28/25       Patient will complete Goal-Plan-Action-Review with 90% accuracy independently.            Stg 4       Start:  07/17/25    Expected End:  08/28/25       Patient will recall 3 out of 4 memory strategies with minimal cues and demonstrate use of these strategies within structured therapy tasks, as well as discuss how they are being implemented in the home and other functional environments.         Stg 5       Start:  07/17/25    Expected End:  08/28/25       Patient will utilize compensatory attention strategies (e.g., using a timer, following a checklist, taking scheduled breaks, etc) to complete functional tasks with minimal verbal cues and 90% accuracy              2. Long term goals        Ltg 1       Start:  07/17/25    Expected End:  08/28/25       Patient will independently and effectively implement thought organization strategies across multiple settings (e.g., home, work, social situations) to improve overall communication clarity and coherence         Ltg 2       Start:  07/17/25    Expected End:  08/28/25       Patient will develop appropriate compensatory strategies to compensate for cognitive deficits to improve efficiency and efficacy of task completion             MILAD Arroyo-SLP

## 2025-07-23 ENCOUNTER — CLINICAL SUPPORT (OUTPATIENT)
Dept: REHABILITATION | Facility: HOSPITAL | Age: 65
End: 2025-07-23
Payer: COMMERCIAL

## 2025-07-23 DIAGNOSIS — R41.841 COGNITIVE COMMUNICATION DEFICIT: Primary | ICD-10-CM

## 2025-07-23 PROCEDURE — 97130 THER IVNTJ EA ADDL 15 MIN: CPT | Mod: PO

## 2025-07-23 PROCEDURE — 97129 THER IVNTJ 1ST 15 MIN: CPT | Mod: PO

## 2025-07-23 NOTE — PROGRESS NOTES
Outpatient Rehab    Speech-Language Pathology Visit    Patient Name: Parth Juarez  MRN: [id number]  YOB: 1960  Encounter Date: 7/23/2025    Therapy Diagnosis:   Encounter Diagnosis   Name Primary?    Cognitive communication deficit Yes     Physician: Mary Wilks, PhD    Physician Orders: Eval and Treat  Medical Diagnosis: Attention and concentration deficit  Memory loss  Surgical Diagnosis: Not applicable for this Episode   Surgical Date: Not applicable for this Episode  Days Since Last Surgery: Not applicable for this Episode    Visit # / Visits Authorized: 1 / 10   Insurance Authorization Period: 7/16/2025 to 12/31/2025  Date of Evaluation: 7/17/2025   Plan of Care Certification: 7/17/2025 to 8/28/2025      Time In: 0800   Time Out: 0845  Total Time (in minutes): 45   Total Billable Time (in minutes): 45    FOTO:  Intake Score (%): Not applicable for this Episode  Survey Score 2 (%): Not applicable for this Episode  Survey Score 3 (%): Not applicable for this Episode  DUE NEXT SESSION    Precautions:  Additional Precautions and Protocol Details: Standard    Subjective   Patient arrived on time and in pleasant spirits.    No pain reported      Objective          Short Term Goals: (6 weeks) Current Progress:   Patient will use thought organization strategies (e.g., externalize thoughts, slow down, visualize, brainstorming, etc) with minimal cues during therapy tasks to organize and express thoughts in 4/5 opportunities when discussing familiar/unfamiliar topics or completing structured tasks.     Progressing/ Not Met 7/23/2025    Not addressed in today's session.   Patient will prioritize daily activities (work or home) by making a list with 3-4 target tasks for the day in a prioritized list and demonstrate and understanding of the rationale to reduced cognitive load     Progressing/ Not Met 7/23/2025    Not addressed in today's session.   Patient will complete Goal-Plan-Action-Review with 90%  accuracy independently.     Progressing/ Not Met 7/23/2025    Not addressed in today's session.     Patient will recall 3 out of 4 memory strategies with minimal cues and demonstrate use of these strategies within structured therapy tasks, as well as discuss how they are being implemented in the home and other functional environments.     Progressing/ Not Met 7/23/2025   - Education and handout provided this date on WRAP (Paaqd-Omcyit-Mtnkgiade-Picture) memory strategies    -Patient verbalized using 'write' and 'picture' independently in his home and work environment        Patient will utilize compensatory attention strategies (e.g., using a timer, following a checklist, taking scheduled breaks, etc) to complete functional tasks with minimal verbal cues and 90% accuracy     Progressing/ Not Met 7/23/2025   -Handout and education provided this date on attention strategies; due to time constraints page not fully completed    -Complete in next session         Time Entry(in minutes):  Cognitive Skill Development Time Entry: 45    Assessment & Plan   Assessment  Patient participated well in today's session focused on review of [facility] assessment and education. Discussed results of patient's assessment and provided rationale for goals in POC. Patient verbalized understanding and agreement for goals. Education provided on WRAP (Etmwd-Uvducs-Rxjdeqepq-Picture) memory strategies as well as attention strategies. However, due to time constraints, education not completed on both attention pages. To be reviewed in next session. Current goals remain appropriate and will be updated as needed.   Evaluation/Treatment Tolerance: Patient tolerated treatment well    The patient will continue to benefit from skilled outpatient speech therapy in order to address the deficits listed in the problem list on the initial evaluation, provide patient and family education, and maximize the patients level of independence in the home and community  environments.     The patient's spiritual, cultural, and educational needs were considered, and the patient is agreeable to the plan of care and goals.          Plan  Continue as established          Goals:   Active       1. Short term goals       Stg 1 (Progressing)       Start:  07/17/25    Expected End:  08/28/25       Patient will use thought organization strategies (e.g., externalize thoughts, slow down, visualize, brainstorming, etc) with minimal cues during therapy tasks to organize and express thoughts in 4/5 opportunities when discussing familiar/unfamiliar topics or completing structured tasks.           Stg 2 (Progressing)       Start:  07/17/25    Expected End:  08/28/25       Patient will prioritize daily activities (work or home) by making a list with 3-4 target tasks for the day in a prioritized list and demonstrate and understanding of the rationale to reduced cognitive load         Stg 3 (Progressing)       Start:  07/17/25    Expected End:  08/28/25       Patient will complete Goal-Plan-Action-Review with 90% accuracy independently.            Stg 4 (Progressing)       Start:  07/17/25    Expected End:  08/28/25       Patient will recall 3 out of 4 memory strategies with minimal cues and demonstrate use of these strategies within structured therapy tasks, as well as discuss how they are being implemented in the home and other functional environments.         Stg 5 (Progressing)       Start:  07/17/25    Expected End:  08/28/25       Patient will utilize compensatory attention strategies (e.g., using a timer, following a checklist, taking scheduled breaks, etc) to complete functional tasks with minimal verbal cues and 90% accuracy              2. Long term goals        Ltg 1 (Progressing)       Start:  07/17/25    Expected End:  08/28/25       Patient will independently and effectively implement thought organization strategies across multiple settings (e.g., home, work, social situations) to improve  overall communication clarity and coherence         Ltg 2 (Progressing)       Start:  07/17/25    Expected End:  08/28/25       Patient will develop appropriate compensatory strategies to compensate for cognitive deficits to improve efficiency and efficacy of task completion             MILAD Arroyo-SLP

## 2025-07-24 ENCOUNTER — CLINICAL SUPPORT (OUTPATIENT)
Dept: PSYCHIATRY | Facility: CLINIC | Age: 65
End: 2025-07-24
Payer: COMMERCIAL

## 2025-07-24 DIAGNOSIS — Z65.8: ICD-10-CM

## 2025-07-24 DIAGNOSIS — F41.9 ANXIETY: ICD-10-CM

## 2025-07-24 DIAGNOSIS — F43.20 ADJUSTMENT DISORDER, UNSPECIFIED TYPE: Primary | ICD-10-CM

## 2025-07-24 PROCEDURE — 90837 PSYTX W PT 60 MINUTES: CPT | Mod: S$GLB,,, | Performed by: COUNSELOR

## 2025-07-24 NOTE — PROGRESS NOTES
"Individual Psychotherapy LPC  7/24/25    Site/Location:  [facility]    Visit Type: 60 min outpt individual psychotherapy    Participants: Ct and this clinician.    Therapeutic Intervention: Met with patient Outpatient - Interactive psychotherapy 60 min - CPT code 93073    Chief complaint/reason for encounter: Depression / Anxiety    Interval history and content of current session:Client reported no significant change in mood since the previous session.  Client reported that he is participating in speech therapy to help with memory issues.  He reported that he feels the sessions will be helpful and productive.  Discussed other ways to improve memory including decreasing stress, relaxing, aroma therapy.  Client participated in a progressive muscle relaxation exercise as well as somatic experience exercise.  Client reported feeling "relaxed".  He identified negative body sensation in his neck.  Client reported feeling of "better" after the exercises.  Discussed client noticing thoughts and body sensations in real-time and journaling this in his notebook.  Client is also using his notebook to help with memory.  Today client participated in calm breathing and grounding.  He denied any current or recent SI/HI.    Treatment plan:  Target symptoms: depression/anxiety  Why chosen therapy is appropriate versus another modality: Relevant to diagnosis  Outcome monitoring methods: self-report. CSSRS.   Therapeutic intervention type: supportive psychotherapy. IFS therapy, memory reconsolidation.     Risk parameters:  Patient reports no suicidal ideation  Patient reports no homicidal ideation  Patient reports no self-injurious behavior  Patient reports no violent behavior    Verbal deficits: None    Patient's response to intervention:  The patient's response to intervention is accepting.    Progress toward goals and other mental status changes:  The patient's progress toward goals is good.    Diagnosis:   Adjustment " disorder, unspecified type  Role change  Anxiety    Plan:  Individual psychotherapy weekly. Utilize IFS therapy and memory reconsolidation to raise emotional tolerance and decrease negative symptoms. Ct acknowledged plan and is agreement with the plan.    Return to clinic: 1 week    Length of Service (minutes): 57       Each patient to whom he or she provides medical services by telemedicine is: (1) informed of the relationship between the physician and patient and the respective role of any other health care provider with respect to management of the patient; and (2) notified that he or she may decline to receive medical services by telemedicine and may withdraw from such care at any time.

## 2025-07-30 ENCOUNTER — TELEPHONE (OUTPATIENT)
Dept: FAMILY MEDICINE | Facility: CLINIC | Age: 65
End: 2025-07-30
Payer: COMMERCIAL

## 2025-07-30 ENCOUNTER — CLINICAL SUPPORT (OUTPATIENT)
Dept: REHABILITATION | Facility: HOSPITAL | Age: 65
End: 2025-07-30
Payer: COMMERCIAL

## 2025-07-30 DIAGNOSIS — R41.841 COGNITIVE COMMUNICATION DEFICIT: Primary | ICD-10-CM

## 2025-07-30 PROCEDURE — 97129 THER IVNTJ 1ST 15 MIN: CPT | Mod: PO

## 2025-07-30 PROCEDURE — 97130 THER IVNTJ EA ADDL 15 MIN: CPT | Mod: PO

## 2025-07-30 NOTE — PROGRESS NOTES
Outpatient Rehab    Speech-Language Pathology Visit    Patient Name: Parth Juarez  MRN: [id number]  YOB: 1960  Encounter Date: 7/30/2025    Therapy Diagnosis:   Encounter Diagnosis   Name Primary?    Cognitive communication deficit Yes       Physician: Mary Wilks, PhD    Physician Orders: Eval and Treat  Medical Diagnosis: Attention and concentration deficit  Memory loss  Surgical Diagnosis: Not applicable for this Episode   Surgical Date: Not applicable for this Episode  Days Since Last Surgery: Not applicable for this Episode    Visit # / Visits Authorized: 2 / 10   Insurance Authorization Period: 7/16/2025 to 12/31/2025  Date of Evaluation: 7/17/2025   Plan of Care Certification: 7/17/2025 to 8/28/2025      Time In: 0800   Time Out: 0845  Total Time (in minutes): 45   Total Billable Time (in minutes): 45    FOTO:  Intake Score (%): 50.5  Survey Score 2 (%): Not applicable for this Episode  Survey Score 3 (%): Not applicable for this Episode    Precautions:  Additional Precautions and Protocol Details: Standard    Subjective   Patient arrived on time and reported feeling well. Noted to have shown up to therapy yesterday due to confusion with scheduled dates, reportedly not using a calendar for reference.  Pain reported as 0/10. No pain reported      Objective          Short Term Goals: (6 weeks) Current Progress:   Patient will use thought organization strategies (e.g., externalize thoughts, slow down, visualize, brainstorming, etc) with minimal cues during therapy tasks to organize and express thoughts in 4/5 opportunities when discussing familiar/unfamiliar topics or completing structured tasks.     Progressing/ Not Met 7/30/2025   -Education and handout provided this date on thought organization strategies; patient verbalized he brainstorms on paper and provided visual copy of ways he uses this strategy    Patient will prioritize daily activities (work or home) by making a list with 3-4  target tasks for the day in a prioritized list and demonstrate and understanding of the rationale to reduced cognitive load     Progressing/ Not Met 7/30/2025   -Discussed use of checklist in the home; however, due to time constraints could not be completed within session   Patient will complete Goal-Plan-Action-Review with 90% accuracy independently.     Progressing/ Not Met 7/30/2025    Not addressed in today's session.     Patient will recall 3 out of 4 memory strategies with minimal cues and demonstrate use of these strategies within structured therapy tasks, as well as discuss how they are being implemented in the home and other functional environments.     Progressing/ Not Met 7/30/2025   - Patient uses his phone to keep track of appointments; however, had difficulty keeping track this week and showed up to therapy on incorrect date    -Encouraged patient to look at his calendar and aided in setting up reminders from his phone for upcoming appointments       Patient will utilize compensatory attention strategies (e.g., using a timer, following a checklist, taking scheduled breaks, etc) to complete functional tasks with minimal verbal cues and 90% accuracy     Progressing/ Not Met 7/30/2025   -Completed review of attention strategies this date; patient verbalized understanding     -Reports he has difficulty taking breaks but will begin to try moving forward           Time Entry(in minutes):  Cognitive Skill Development Time Entry: 45    Assessment & Plan   Assessment  Patient participated well in today's session, which focused on education and strategy use. Patient reported accidentally arriving for therapy on the incorrect day; discussed this occurrence and added reminders to his phone calendar to support recall of appointment dates and times. Thought organization strategies were introduced this date, and patient endorsed using them when writing papers related to upcoming presentations. Current goals remain  appropriate and will be updated as needed.  Evaluation/Treatment Tolerance: Patient tolerated treatment well    The patient will continue to benefit from skilled outpatient speech therapy in order to address the deficits listed in the problem list on the initial evaluation, provide patient and family education, and maximize the patients level of independence in the home and community environments.     The patient's spiritual, cultural, and educational needs were considered, and the patient is agreeable to the plan of care and goals.          Plan  Continue as established          Goals:   Active       1. Short term goals       Stg 1 (Progressing)       Start:  07/17/25    Expected End:  08/28/25       Patient will use thought organization strategies (e.g., externalize thoughts, slow down, visualize, brainstorming, etc) with minimal cues during therapy tasks to organize and express thoughts in 4/5 opportunities when discussing familiar/unfamiliar topics or completing structured tasks.           Stg 2 (Progressing)       Start:  07/17/25    Expected End:  08/28/25       Patient will prioritize daily activities (work or home) by making a list with 3-4 target tasks for the day in a prioritized list and demonstrate and understanding of the rationale to reduced cognitive load         Stg 3 (Progressing)       Start:  07/17/25    Expected End:  08/28/25       Patient will complete Goal-Plan-Action-Review with 90% accuracy independently.            Stg 4 (Progressing)       Start:  07/17/25    Expected End:  08/28/25       Patient will recall 3 out of 4 memory strategies with minimal cues and demonstrate use of these strategies within structured therapy tasks, as well as discuss how they are being implemented in the home and other functional environments.         Stg 5 (Progressing)       Start:  07/17/25    Expected End:  08/28/25       Patient will utilize compensatory attention strategies (e.g., using a timer, following  a checklist, taking scheduled breaks, etc) to complete functional tasks with minimal verbal cues and 90% accuracy              2. Long term goals        Ltg 1 (Progressing)       Start:  07/17/25    Expected End:  08/28/25       Patient will independently and effectively implement thought organization strategies across multiple settings (e.g., home, work, social situations) to improve overall communication clarity and coherence         Ltg 2 (Progressing)       Start:  07/17/25    Expected End:  08/28/25       Patient will develop appropriate compensatory strategies to compensate for cognitive deficits to improve efficiency and efficacy of task completion             MILAD Arroyo-SLP

## 2025-07-30 NOTE — TELEPHONE ENCOUNTER
[facility] paperwork for continuous leave of absence completed and fax to [phone]

## 2025-08-06 ENCOUNTER — OFFICE VISIT (OUTPATIENT)
Dept: PSYCHIATRY | Facility: CLINIC | Age: 65
End: 2025-08-06
Payer: COMMERCIAL

## 2025-08-06 VITALS
WEIGHT: 180.75 LBS | BODY MASS INDEX: 28.37 KG/M2 | HEIGHT: 67 IN | HEART RATE: 67 BPM | DIASTOLIC BLOOD PRESSURE: 67 MMHG | SYSTOLIC BLOOD PRESSURE: 108 MMHG

## 2025-08-06 DIAGNOSIS — F43.20 ADJUSTMENT DISORDER, UNSPECIFIED TYPE: Primary | ICD-10-CM

## 2025-08-06 DIAGNOSIS — Z65.8: ICD-10-CM

## 2025-08-06 DIAGNOSIS — F33.42 MDD (MAJOR DEPRESSIVE DISORDER), RECURRENT, IN FULL REMISSION: ICD-10-CM

## 2025-08-06 DIAGNOSIS — F90.2 ADHD (ATTENTION DEFICIT HYPERACTIVITY DISORDER), COMBINED TYPE: ICD-10-CM

## 2025-08-06 DIAGNOSIS — F41.9 ANXIETY: ICD-10-CM

## 2025-08-06 PROCEDURE — 99999 PR PBB SHADOW E&M-EST. PATIENT-LVL IV: CPT | Mod: PBBFAC,,, | Performed by: STUDENT IN AN ORGANIZED HEALTH CARE EDUCATION/TRAINING PROGRAM

## 2025-08-06 RX ORDER — BUPROPION HYDROCHLORIDE 300 MG/1
300 TABLET ORAL EVERY MORNING
Qty: 90 TABLET | Refills: 1 | Status: SHIPPED | OUTPATIENT
Start: 2025-08-06 | End: 2026-02-02

## 2025-08-06 NOTE — PATIENT INSTRUCTIONS
Continue medicine as prescribed    Please keep therapy appointments       Attention Tips Remember that inattention and lack of focus are major culprits to forgetting information so be sure and practice paying attention for adequate learning of information. If you rely on passive attention to remembering something (e.g., yeah, uh-huh approach), you'll find you cannot recall it later. I recommend the following to improve attention, which may aid in later recall:   Reduce distractions as much as possible.  Look at the person as they are speaking to you.   Paraphrase as they are speaking  Write down important pieces of information   Ask people to repeat if you zone out.    Have visual cues  (posted to-do-list, daily schedule) to remind you if you need to do something later.   Processing Speed Tips Using multiple modalities (e.g., listening, writing notes, asking questions, recording) to learn new information is likely to allow additional time for processing, thus improving memory for the material.   Allowing sufficient time to complete tasks will reduce frustration and help to ensure completion.  Spend a lot of up-front time planning in advance how long a task may take and then chunk steps in the task so you don't wear yourself out.   Executive Functioning Tips: Don't attempt to multi-task.  Separate tasks so that each can be completed one at a time.  Consider using a calendar/day planner, as that may be effective to help you plan and stay on track.  Color-coding specific tasks by importance may add additional benefit to your planner.  Break down large projects into smaller tasks and write down the steps to completing the task.

## 2025-08-07 ENCOUNTER — CLINICAL SUPPORT (OUTPATIENT)
Dept: PSYCHIATRY | Facility: CLINIC | Age: 65
End: 2025-08-07
Payer: COMMERCIAL

## 2025-08-07 DIAGNOSIS — Z65.8: ICD-10-CM

## 2025-08-07 DIAGNOSIS — F43.20 ADJUSTMENT DISORDER, UNSPECIFIED TYPE: Primary | ICD-10-CM

## 2025-08-07 DIAGNOSIS — F41.9 ANXIETY: ICD-10-CM

## 2025-08-07 PROCEDURE — 90837 PSYTX W PT 60 MINUTES: CPT | Mod: S$GLB,,, | Performed by: COUNSELOR

## 2025-08-14 ENCOUNTER — CLINICAL SUPPORT (OUTPATIENT)
Dept: REHABILITATION | Facility: HOSPITAL | Age: 65
End: 2025-08-14
Payer: COMMERCIAL

## 2025-08-14 DIAGNOSIS — R41.841 COGNITIVE COMMUNICATION DEFICIT: Primary | ICD-10-CM

## 2025-08-14 PROCEDURE — 97130 THER IVNTJ EA ADDL 15 MIN: CPT | Mod: PO

## 2025-08-14 PROCEDURE — 97129 THER IVNTJ 1ST 15 MIN: CPT | Mod: PO

## 2025-08-21 ENCOUNTER — CLINICAL SUPPORT (OUTPATIENT)
Dept: REHABILITATION | Facility: HOSPITAL | Age: 65
End: 2025-08-21
Payer: COMMERCIAL

## 2025-08-21 ENCOUNTER — CLINICAL SUPPORT (OUTPATIENT)
Dept: PSYCHIATRY | Facility: CLINIC | Age: 65
End: 2025-08-21
Payer: COMMERCIAL

## 2025-08-21 DIAGNOSIS — F41.9 ANXIETY: ICD-10-CM

## 2025-08-21 DIAGNOSIS — Z65.8: ICD-10-CM

## 2025-08-21 DIAGNOSIS — R41.841 COGNITIVE COMMUNICATION DEFICIT: Primary | ICD-10-CM

## 2025-08-21 DIAGNOSIS — F43.20 ADJUSTMENT DISORDER, UNSPECIFIED TYPE: Primary | ICD-10-CM

## 2025-08-21 PROCEDURE — 90837 PSYTX W PT 60 MINUTES: CPT | Mod: S$GLB,,, | Performed by: COUNSELOR

## 2025-08-21 PROCEDURE — 97130 THER IVNTJ EA ADDL 15 MIN: CPT | Mod: PO

## 2025-08-21 PROCEDURE — 97129 THER IVNTJ 1ST 15 MIN: CPT | Mod: PO

## 2025-08-24 DIAGNOSIS — R97.20 BPH WITH ELEVATED PSA: ICD-10-CM

## 2025-08-24 DIAGNOSIS — N40.0 BPH WITH ELEVATED PSA: ICD-10-CM

## 2025-08-25 RX ORDER — TAMSULOSIN HYDROCHLORIDE 0.4 MG/1
2 CAPSULE ORAL NIGHTLY
Qty: 180 CAPSULE | Refills: 0 | Status: SHIPPED | OUTPATIENT
Start: 2025-08-25

## 2025-08-28 ENCOUNTER — CLINICAL SUPPORT (OUTPATIENT)
Dept: REHABILITATION | Facility: HOSPITAL | Age: 65
End: 2025-08-28
Payer: COMMERCIAL

## 2025-08-28 DIAGNOSIS — R41.841 COGNITIVE COMMUNICATION DEFICIT: Primary | ICD-10-CM

## 2025-08-28 PROCEDURE — 97129 THER IVNTJ 1ST 15 MIN: CPT | Mod: PO

## 2025-08-28 PROCEDURE — 97130 THER IVNTJ EA ADDL 15 MIN: CPT | Mod: PO

## 2025-08-29 ENCOUNTER — E-VISIT (OUTPATIENT)
Dept: FAMILY MEDICINE | Facility: CLINIC | Age: 65
End: 2025-08-29
Payer: COMMERCIAL

## 2025-08-29 DIAGNOSIS — F33.42 MDD (MAJOR DEPRESSIVE DISORDER), RECURRENT, IN FULL REMISSION: ICD-10-CM

## 2025-08-29 DIAGNOSIS — F41.9 ANXIETY: ICD-10-CM

## 2025-08-29 DIAGNOSIS — F90.2 ADHD (ATTENTION DEFICIT HYPERACTIVITY DISORDER), COMBINED TYPE: ICD-10-CM

## 2025-08-29 DIAGNOSIS — N40.0 BPH WITH ELEVATED PSA: ICD-10-CM

## 2025-08-29 DIAGNOSIS — R97.20 BPH WITH ELEVATED PSA: ICD-10-CM

## 2025-08-29 DIAGNOSIS — R41.3 MEMORY PROBLEM: Primary | ICD-10-CM

## 2025-09-03 RX ORDER — TAMSULOSIN HYDROCHLORIDE 0.4 MG/1
2 CAPSULE ORAL NIGHTLY
Qty: 180 CAPSULE | Refills: 0 | Status: SHIPPED | OUTPATIENT
Start: 2025-09-03

## 2025-09-04 ENCOUNTER — CLINICAL SUPPORT (OUTPATIENT)
Dept: REHABILITATION | Facility: HOSPITAL | Age: 65
End: 2025-09-04
Payer: COMMERCIAL

## 2025-09-04 ENCOUNTER — TELEPHONE (OUTPATIENT)
Dept: FAMILY MEDICINE | Facility: CLINIC | Age: 65
End: 2025-09-04
Payer: COMMERCIAL

## 2025-09-04 DIAGNOSIS — R41.841 COGNITIVE COMMUNICATION DEFICIT: Primary | ICD-10-CM

## 2025-09-04 PROCEDURE — 97129 THER IVNTJ 1ST 15 MIN: CPT | Mod: PO

## 2025-09-04 PROCEDURE — 97130 THER IVNTJ EA ADDL 15 MIN: CPT | Mod: PO

## 2025-09-05 ENCOUNTER — CLINICAL SUPPORT (OUTPATIENT)
Dept: PSYCHIATRY | Facility: CLINIC | Age: 65
End: 2025-09-05
Payer: COMMERCIAL

## 2025-09-05 DIAGNOSIS — F41.9 ANXIETY: ICD-10-CM

## 2025-09-05 DIAGNOSIS — Z65.8: ICD-10-CM

## 2025-09-05 DIAGNOSIS — F43.20 ADJUSTMENT DISORDER, UNSPECIFIED TYPE: Primary | ICD-10-CM

## (undated) DEVICE — SYR 10CC LUER LOCK

## (undated) DEVICE — NDL SPINAL 22GX7 SPINOCAN

## (undated) DEVICE — SPONGE GAUZE 16PLY 4X4

## (undated) DEVICE — NDL HYPODERMIC BLUNT 18G 1.5IN

## (undated) DEVICE — COVER PROBE 3D/4D

## (undated) DEVICE — GUN BIOPSY 18GA MONOPLY

## (undated) DEVICE — DRAPE MINOR PROCEDURE

## (undated) DEVICE — SCRUB 10% POVIDONE IODINE 4OZ

## (undated) DEVICE — WATER STERILE INJ 500ML BAG

## (undated) DEVICE — SET CYSTO IRRIGATION UNIV SPIK

## (undated) DEVICE — COVER TRANSDUCER LATEX N/STERI

## (undated) DEVICE — SHEET DRAPE MEDIUM

## (undated) DEVICE — GLOVE SURG ULTRA TOUCH 7

## (undated) DEVICE — GUIDE BIOPSY BIPLANAR 18G